# Patient Record
Sex: FEMALE | Race: WHITE | NOT HISPANIC OR LATINO | Employment: OTHER | URBAN - METROPOLITAN AREA
[De-identification: names, ages, dates, MRNs, and addresses within clinical notes are randomized per-mention and may not be internally consistent; named-entity substitution may affect disease eponyms.]

---

## 2023-03-20 DIAGNOSIS — I26.09 OTHER PULMONARY EMBOLISM WITH ACUTE COR PULMONALE (MULTI): ICD-10-CM

## 2023-03-20 DIAGNOSIS — F09 MILD COGNITIVE DISORDER: ICD-10-CM

## 2023-03-20 DIAGNOSIS — I26.02 ACUTE SADDLE PULMONARY EMBOLISM WITH ACUTE COR PULMONALE (MULTI): ICD-10-CM

## 2023-03-20 DIAGNOSIS — I10 BENIGN ESSENTIAL HYPERTENSION: Primary | ICD-10-CM

## 2023-03-20 RX ORDER — RIVAROXABAN 20 MG/1
TABLET, FILM COATED ORAL
Qty: 90 TABLET | Refills: 0 | Status: SHIPPED | OUTPATIENT
Start: 2023-03-20 | End: 2023-06-23

## 2023-05-10 ENCOUNTER — LAB (OUTPATIENT)
Dept: LAB | Facility: LAB | Age: 83
End: 2023-05-10
Payer: MEDICARE

## 2023-05-10 DIAGNOSIS — F09 MILD COGNITIVE DISORDER: ICD-10-CM

## 2023-05-10 DIAGNOSIS — I10 BENIGN ESSENTIAL HYPERTENSION: ICD-10-CM

## 2023-05-10 LAB
ALANINE AMINOTRANSFERASE (SGPT) (U/L) IN SER/PLAS: 9 U/L (ref 7–45)
ALBUMIN (G/DL) IN SER/PLAS: 3.9 G/DL (ref 3.4–5)
ALKALINE PHOSPHATASE (U/L) IN SER/PLAS: 57 U/L (ref 33–136)
ANION GAP IN SER/PLAS: 15 MMOL/L (ref 10–20)
ASPARTATE AMINOTRANSFERASE (SGOT) (U/L) IN SER/PLAS: 17 U/L (ref 9–39)
BASOPHILS (10*3/UL) IN BLOOD BY AUTOMATED COUNT: 0.04 X10E9/L (ref 0–0.1)
BASOPHILS/100 LEUKOCYTES IN BLOOD BY AUTOMATED COUNT: 0.6 % (ref 0–2)
BILIRUBIN TOTAL (MG/DL) IN SER/PLAS: 0.9 MG/DL (ref 0–1.2)
CALCIUM (MG/DL) IN SER/PLAS: 9.7 MG/DL (ref 8.6–10.3)
CARBON DIOXIDE, TOTAL (MMOL/L) IN SER/PLAS: 26 MMOL/L (ref 21–32)
CHLORIDE (MMOL/L) IN SER/PLAS: 104 MMOL/L (ref 98–107)
CHOLESTEROL (MG/DL) IN SER/PLAS: 235 MG/DL (ref 0–199)
CHOLESTEROL IN HDL (MG/DL) IN SER/PLAS: 110.6 MG/DL
CHOLESTEROL/HDL RATIO: 2.1
CREATININE (MG/DL) IN SER/PLAS: 1.36 MG/DL (ref 0.5–1.05)
EOSINOPHILS (10*3/UL) IN BLOOD BY AUTOMATED COUNT: 0.06 X10E9/L (ref 0–0.4)
EOSINOPHILS/100 LEUKOCYTES IN BLOOD BY AUTOMATED COUNT: 0.9 % (ref 0–6)
ERYTHROCYTE DISTRIBUTION WIDTH (RATIO) BY AUTOMATED COUNT: 13.3 % (ref 11.5–14.5)
ERYTHROCYTE MEAN CORPUSCULAR HEMOGLOBIN CONCENTRATION (G/DL) BY AUTOMATED: 33.1 G/DL (ref 32–36)
ERYTHROCYTE MEAN CORPUSCULAR VOLUME (FL) BY AUTOMATED COUNT: 107 FL (ref 80–100)
ERYTHROCYTES (10*6/UL) IN BLOOD BY AUTOMATED COUNT: 4.23 X10E12/L (ref 4–5.2)
GFR FEMALE: 39 ML/MIN/1.73M2
GLUCOSE (MG/DL) IN SER/PLAS: 100 MG/DL (ref 74–99)
HEMATOCRIT (%) IN BLOOD BY AUTOMATED COUNT: 45.3 % (ref 36–46)
HEMOGLOBIN (G/DL) IN BLOOD: 15 G/DL (ref 12–16)
IMMATURE GRANULOCYTES/100 LEUKOCYTES IN BLOOD BY AUTOMATED COUNT: 0.5 % (ref 0–0.9)
LDL: 99 MG/DL (ref 0–99)
LEUKOCYTES (10*3/UL) IN BLOOD BY AUTOMATED COUNT: 6.5 X10E9/L (ref 4.4–11.3)
LYMPHOCYTES (10*3/UL) IN BLOOD BY AUTOMATED COUNT: 1.86 X10E9/L (ref 0.8–3)
LYMPHOCYTES/100 LEUKOCYTES IN BLOOD BY AUTOMATED COUNT: 28.4 % (ref 13–44)
MONOCYTES (10*3/UL) IN BLOOD BY AUTOMATED COUNT: 0.76 X10E9/L (ref 0.05–0.8)
MONOCYTES/100 LEUKOCYTES IN BLOOD BY AUTOMATED COUNT: 11.6 % (ref 2–10)
NEUTROPHILS (10*3/UL) IN BLOOD BY AUTOMATED COUNT: 3.79 X10E9/L (ref 1.6–5.5)
NEUTROPHILS/100 LEUKOCYTES IN BLOOD BY AUTOMATED COUNT: 58 % (ref 40–80)
PLATELETS (10*3/UL) IN BLOOD AUTOMATED COUNT: 235 X10E9/L (ref 150–450)
POTASSIUM (MMOL/L) IN SER/PLAS: 4.7 MMOL/L (ref 3.5–5.3)
PROTEIN TOTAL: 6.4 G/DL (ref 6.4–8.2)
SODIUM (MMOL/L) IN SER/PLAS: 140 MMOL/L (ref 136–145)
TRIGLYCERIDE (MG/DL) IN SER/PLAS: 125 MG/DL (ref 0–149)
UREA NITROGEN (MG/DL) IN SER/PLAS: 19 MG/DL (ref 6–23)
VLDL: 25 MG/DL (ref 0–40)

## 2023-05-10 PROCEDURE — 82607 VITAMIN B-12: CPT

## 2023-05-10 PROCEDURE — 80061 LIPID PANEL: CPT

## 2023-05-10 PROCEDURE — 36415 COLL VENOUS BLD VENIPUNCTURE: CPT

## 2023-05-10 PROCEDURE — 80053 COMPREHEN METABOLIC PANEL: CPT

## 2023-05-10 PROCEDURE — 84443 ASSAY THYROID STIM HORMONE: CPT

## 2023-05-10 PROCEDURE — 85025 COMPLETE CBC W/AUTO DIFF WBC: CPT

## 2023-05-11 LAB
COBALAMIN (VITAMIN B12) (PG/ML) IN SER/PLAS: 1287 PG/ML (ref 211–911)
THYROTROPIN (MIU/L) IN SER/PLAS BY DETECTION LIMIT <= 0.05 MIU/L: 2.63 MIU/L (ref 0.44–3.98)

## 2023-05-15 ENCOUNTER — OFFICE VISIT (OUTPATIENT)
Dept: PRIMARY CARE | Facility: CLINIC | Age: 83
End: 2023-05-15
Payer: MEDICARE

## 2023-05-15 VITALS
HEART RATE: 68 BPM | BODY MASS INDEX: 24.16 KG/M2 | DIASTOLIC BLOOD PRESSURE: 64 MMHG | WEIGHT: 145 LBS | HEIGHT: 65 IN | SYSTOLIC BLOOD PRESSURE: 130 MMHG

## 2023-05-15 DIAGNOSIS — Z00.00 ROUTINE GENERAL MEDICAL EXAMINATION AT HEALTH CARE FACILITY: Primary | ICD-10-CM

## 2023-05-15 DIAGNOSIS — M81.8 IDIOPATHIC OSTEOPOROSIS: ICD-10-CM

## 2023-05-15 DIAGNOSIS — I10 BENIGN ESSENTIAL HYPERTENSION: ICD-10-CM

## 2023-05-15 DIAGNOSIS — N18.32 STAGE 3B CHRONIC KIDNEY DISEASE (MULTI): ICD-10-CM

## 2023-05-15 DIAGNOSIS — D68.59 OTHER PRIMARY THROMBOPHILIA (MULTI): ICD-10-CM

## 2023-05-15 DIAGNOSIS — I26.09 PULMONARY EMBOLISM WITH ACUTE COR PULMONALE, UNSPECIFIED CHRONICITY, UNSPECIFIED PULMONARY EMBOLISM TYPE (MULTI): ICD-10-CM

## 2023-05-15 DIAGNOSIS — M81.0 POSTMENOPAUSAL BONE LOSS: ICD-10-CM

## 2023-05-15 DIAGNOSIS — Z12.31 VISIT FOR SCREENING MAMMOGRAM: ICD-10-CM

## 2023-05-15 PROBLEM — Z95.828 PRESENCE OF IVC FILTER: Status: ACTIVE | Noted: 2023-05-15

## 2023-05-15 PROBLEM — E78.5 HYPERLIPIDEMIA: Status: ACTIVE | Noted: 2023-05-15

## 2023-05-15 PROBLEM — R29.898 COGWHEEL RIGIDITY: Status: ACTIVE | Noted: 2023-05-15

## 2023-05-15 PROBLEM — Z20.822 EXPOSURE TO COVID-19 VIRUS: Status: ACTIVE | Noted: 2023-05-15

## 2023-05-15 PROBLEM — R91.1 LUNG NODULE: Status: ACTIVE | Noted: 2023-05-15

## 2023-05-15 PROBLEM — K63.5 COLON POLYPS: Status: ACTIVE | Noted: 2023-05-15

## 2023-05-15 PROBLEM — I26.99 ACUTE PULMONARY EMBOLISM (MULTI): Status: ACTIVE | Noted: 2021-06-23

## 2023-05-15 PROBLEM — I26.99 PULMONARY EMBOLUS (MULTI): Status: ACTIVE | Noted: 2023-05-15

## 2023-05-15 PROBLEM — G25.9 EXTRAPYRAMIDAL MOVEMENT DISORDER: Status: ACTIVE | Noted: 2023-05-15

## 2023-05-15 PROBLEM — D69.6 THROMBOCYTOPENIA (CMS-HCC): Status: ACTIVE | Noted: 2021-06-24

## 2023-05-15 PROBLEM — R92.8 ABNORMAL MAMMOGRAM: Status: ACTIVE | Noted: 2023-05-15

## 2023-05-15 PROBLEM — E55.9 VITAMIN D DEFICIENCY: Status: ACTIVE | Noted: 2023-05-15

## 2023-05-15 PROBLEM — G31.84 MILD COGNITIVE IMPAIRMENT: Status: ACTIVE | Noted: 2023-05-15

## 2023-05-15 PROBLEM — E53.8 VITAMIN B12 DEFICIENCY: Status: ACTIVE | Noted: 2023-05-15

## 2023-05-15 PROBLEM — R79.89 ELEVATED SERUM CREATININE: Status: ACTIVE | Noted: 2023-05-15

## 2023-05-15 PROBLEM — R46.89 BEHAVIORAL CHANGE: Status: ACTIVE | Noted: 2023-05-15

## 2023-05-15 PROCEDURE — 1036F TOBACCO NON-USER: CPT | Performed by: INTERNAL MEDICINE

## 2023-05-15 PROCEDURE — 3078F DIAST BP <80 MM HG: CPT | Performed by: INTERNAL MEDICINE

## 2023-05-15 PROCEDURE — 1159F MED LIST DOCD IN RCRD: CPT | Performed by: INTERNAL MEDICINE

## 2023-05-15 PROCEDURE — 3075F SYST BP GE 130 - 139MM HG: CPT | Performed by: INTERNAL MEDICINE

## 2023-05-15 PROCEDURE — 1170F FXNL STATUS ASSESSED: CPT | Performed by: INTERNAL MEDICINE

## 2023-05-15 PROCEDURE — 1160F RVW MEDS BY RX/DR IN RCRD: CPT | Performed by: INTERNAL MEDICINE

## 2023-05-15 PROCEDURE — 99214 OFFICE O/P EST MOD 30 MIN: CPT | Performed by: INTERNAL MEDICINE

## 2023-05-15 RX ORDER — ACETAMINOPHEN, DEXTROMETHORPHAN HBR, DOXYLAMINE SUCCINATE, PHENYLEPHRINE HCL 650; 20; 12.5; 1 MG/30ML; MG/30ML; MG/30ML; MG/30ML
1 SOLUTION ORAL DAILY
COMMUNITY

## 2023-05-15 RX ORDER — ACETAMINOPHEN 500 MG
50 TABLET ORAL DAILY
COMMUNITY

## 2023-05-15 ASSESSMENT — ACTIVITIES OF DAILY LIVING (ADL)
BATHING: INDEPENDENT
MANAGING_FINANCES: NEEDS ASSISTANCE
GROCERY_SHOPPING: NEEDS ASSISTANCE
DOING_HOUSEWORK: NEEDS ASSISTANCE
TAKING_MEDICATION: INDEPENDENT
DRESSING: INDEPENDENT

## 2023-05-15 ASSESSMENT — PATIENT HEALTH QUESTIONNAIRE - PHQ9
1. LITTLE INTEREST OR PLEASURE IN DOING THINGS: NOT AT ALL
2. FEELING DOWN, DEPRESSED OR HOPELESS: NOT AT ALL
SUM OF ALL RESPONSES TO PHQ9 QUESTIONS 1 AND 2: 0

## 2023-05-15 NOTE — PROGRESS NOTES
"Subjective   Patient ID: Lorie Ding is a 82 y.o. female who presents for Medicare Annual Wellness Visit Subsequent.    HPI     Review of Systems    Objective   Ht 1.651 m (5' 5\")   Wt 65.8 kg (145 lb)   BMI 24.13 kg/m²     Physical Exam    Assessment/Plan          "

## 2023-05-15 NOTE — PROGRESS NOTES
"Subjective   Reason for Visit: Lorie Ding is an 82 y.o. female here for a Medicare Wellness visit.     Past Medical, Surgical, and Family History reviewed and updated in chart.    Reviewed all medications by prescribing practitioner or clinical pharmacist (such as prescriptions, OTCs, herbal therapies and supplements) and documented in the medical record.     is worried about her b12 level is high  She walks and plays pickleball  No cp or pressure, no sob palps or LH  Bowels are regular  No urinary issues        Patient Care Team:  Shelley Márquez DO as PCP - General  Shelley Márquez DO as PCP - [facility] [place] [place] [facility] Attributed Provider     Review of Systems    Objective   Vitals:  /64   Pulse 68   Ht 1.651 m (5' 5\")   Wt 65.8 kg (145 lb)   BMI 24.13 kg/m²       Physical Exam  Constitutional:       Appearance: Normal appearance.   Neck:      Vascular: No carotid bruit.   Cardiovascular:      Rate and Rhythm: Normal rate and regular rhythm.      Heart sounds: No murmur heard.  Pulmonary:      Effort: Pulmonary effort is normal.      Breath sounds: Normal breath sounds.   Musculoskeletal:      Right lower leg: No edema.      Left lower leg: No edema.   Skin:     Findings: Lesion (eschar right nasal ala) present.   Neurological:      Mental Status: She is alert.      Comments: Pt with masked face and hypophonia,   Pt was more interactive today, able to answer questions clearly, name of derm who did mohs and who they work with         Assessment/Plan   Problem List Items Addressed This Visit    None  Visit Diagnoses       Routine general medical examination at health care facility    -  Primary            Patient Instructions   Mammogram due in September    Osteoporosis- last bone density was in august, 2021, repeat in sept 2023, call [phone] to schedule mammogram and bone density in september    Colon polyps- last colonoscopy November 2020, repeat in 2025    Recurrent blood clots, on xarelto, life time " need    Immunizations  Had shingrix x 2 2020  Flu shot in the fall, annually  Had new covid booster in September 2022  Recommend prevnar 20 at the pharmacy and then next year would get the pneumovax 23, then would be up to date with all pneumonia shots.     Memory issues and early parkinson symptoms discussed  We discussed importance of exercise and interaction, consider joining a book club to discuss books/interact and also you enjoy pickleball, join a pickleball group in the area    Chronic kidney disease is stable, follow up with Dr. Garcia, avoid NSAIDS, and iv contrast, stay hydrated    Large red cells discussed, limit wine to 4 oz a day    Blood pressure is well controlled  Recheck in 1 year

## 2023-05-15 NOTE — PATIENT INSTRUCTIONS
Mammogram due in September    Osteoporosis- last bone density was in august, 2021, repeat in sept 2023, call [phone] to schedule mammogram and bone density in september    Colon polyps- last colonoscopy November 2020, repeat in 2025    Recurrent blood clots, on xarelto, life time need    Immunizations  Had shingrix x 2 2020  Flu shot in the fall, annually  Had new covid booster in September 2022  Recommend prevnar 20 at the pharmacy and then next year would get the pneumovax 23, then would be up to date with all pneumonia shots.     Memory issues and early parkinson symptoms discussed  We discussed importance of exercise and interaction, consider joining a book club to discuss books/interact and also you enjoy pickleball, join a pickleball group in the area    Chronic kidney disease is stable, follow up with Dr. Garcia, avoid NSAIDS, and iv contrast, stay hydrated    Large red cells discussed, limit wine to 4 oz a day    Blood pressure is well controlled  Recheck in 1 year

## 2023-06-08 LAB
ALBUMIN (MG/L) IN URINE: 10.5 MG/L
ALBUMIN/CREATININE (UG/MG) IN URINE: 5 UG/MG CRT (ref 0–30)
CREATININE (MG/DL) IN URINE: 212 MG/DL (ref 20–320)

## 2023-06-22 DIAGNOSIS — I26.09 OTHER PULMONARY EMBOLISM WITH ACUTE COR PULMONALE (MULTI): ICD-10-CM

## 2023-06-23 RX ORDER — RIVAROXABAN 20 MG/1
TABLET, FILM COATED ORAL
Qty: 90 TABLET | Refills: 1 | Status: SHIPPED | OUTPATIENT
Start: 2023-06-23 | End: 2023-12-13 | Stop reason: SDUPTHER

## 2023-07-21 DIAGNOSIS — I10 HYPERTENSION, UNSPECIFIED TYPE: ICD-10-CM

## 2023-07-21 RX ORDER — LOSARTAN POTASSIUM 50 MG/1
50 TABLET ORAL DAILY
Qty: 90 TABLET | Refills: 1 | Status: SHIPPED | OUTPATIENT
Start: 2023-07-21 | End: 2023-12-13 | Stop reason: SDUPTHER

## 2023-07-21 NOTE — TELEPHONE ENCOUNTER
Requested Prescriptions     Pending Prescriptions Disp Refills    losartan (Cozaar) 50 mg tablet 90 tablet 1     Sig: Take 1 tablet (50 mg) by mouth once daily.

## 2023-08-29 PROBLEM — R55 SYNCOPE: Status: ACTIVE | Noted: 2023-08-29

## 2023-08-29 RX ORDER — DOXYCYCLINE 100 MG/1
100 CAPSULE ORAL 2 TIMES DAILY
COMMUNITY
Start: 2023-06-28 | End: 2023-10-31 | Stop reason: WASHOUT

## 2023-08-29 NOTE — PROGRESS NOTES
Subjective   Patient ID: Lorie Ding is a 82 y.o. female who presents for Hospital Follow-up.    HPI     Review of Systems    Objective   There were no vitals taken for this visit.    Physical Exam    Assessment/Plan

## 2023-08-30 ENCOUNTER — OFFICE VISIT (OUTPATIENT)
Dept: PRIMARY CARE | Facility: CLINIC | Age: 83
End: 2023-08-30
Payer: MEDICARE

## 2023-08-30 VITALS
DIASTOLIC BLOOD PRESSURE: 78 MMHG | SYSTOLIC BLOOD PRESSURE: 135 MMHG | HEART RATE: 103 BPM | BODY MASS INDEX: 23.96 KG/M2 | OXYGEN SATURATION: 98 % | WEIGHT: 144 LBS

## 2023-08-30 DIAGNOSIS — R55 SYNCOPE, UNSPECIFIED SYNCOPE TYPE: Primary | ICD-10-CM

## 2023-08-30 DIAGNOSIS — Z12.31 VISIT FOR SCREENING MAMMOGRAM: ICD-10-CM

## 2023-08-30 PROCEDURE — 99214 OFFICE O/P EST MOD 30 MIN: CPT | Performed by: INTERNAL MEDICINE

## 2023-08-30 PROCEDURE — 3075F SYST BP GE 130 - 139MM HG: CPT | Performed by: INTERNAL MEDICINE

## 2023-08-30 PROCEDURE — 1036F TOBACCO NON-USER: CPT | Performed by: INTERNAL MEDICINE

## 2023-08-30 PROCEDURE — 1159F MED LIST DOCD IN RCRD: CPT | Performed by: INTERNAL MEDICINE

## 2023-08-30 PROCEDURE — 1160F RVW MEDS BY RX/DR IN RCRD: CPT | Performed by: INTERNAL MEDICINE

## 2023-08-30 PROCEDURE — 3078F DIAST BP <80 MM HG: CPT | Performed by: INTERNAL MEDICINE

## 2023-08-30 NOTE — PROGRESS NOTES
Subjective   Patient ID: Lorie Ding is a 82 y.o. female who presents for Hospital Follow-up.    She was at an exercise class, she just got weak and fell to the floor, not sweaty or sob  No loss of bowel or bladder  She lost consciousness for a few seconds.     No palpitations at the time prior to the appt.   No problems doing the work out prior  Happened when standing doing chair yoga    She walks about 3/4 mile a day           Review of Systems    Objective   /77   Pulse 83   Wt 65.3 kg (144 lb)   SpO2 98%   BMI 23.96 kg/m²     Physical Exam  Cardiovascular:      Rate and Rhythm: Normal rate and regular rhythm.      Heart sounds: No murmur heard.  Neurological:      Mental Status: She is alert and oriented to person, place, and time.      Comments: Masked face and stiff movements   No tremor         Assessment/Plan          Patient Instructions   Syncope /passing out  Prior history related to large lung blood clot, but this episode ct showed no clot  Last echocardiogram was normal, no valve issues  Get zio patch/heart monitor, wear for up to 14 days and mail in, to check for heart rhythm problems, call for results 2 weeks after you mail it in  If persists would have you see neurology, to check for  POTS    I left a message for cardiology to schedule a time to get the zio patch    Call [phone] to schedule mammogram (density of breasts on ct, may be dense breasts)     40min spent reviewing ER record/results, and causes of syncope  Of note her bp went from 135/78 standing with 80 pulse to 122/73 standing and 103 pulse

## 2023-08-30 NOTE — PATIENT INSTRUCTIONS
Syncope /passing out  Prior history related to large lung blood clot, but this episode ct showed no clot  Last echocardiogram was normal, no valve issues  Get zio patch/heart monitor, wear for up to 14 days and mail in, to check for heart rhythm problems, call for results 2 weeks after you mail it in  If persists would have you see neurology, to check for  POTS    I left a message for cardiology to schedule a time to get the zio patch    Call [phone] to schedule mammogram (density of breasts on ct, may be dense breasts)        Ways to Help Prevent Falls at Home    Quick Tips   ? Ask for help if you need it. Most people want to help!   ? Get up slowly after sitting or laying down   ? Wear a medical alert device or keep cell phone in your pocket   ? Use night lights, especially areas near a bathroom   ? Keep the items you use often within reach on a small stool or end table   ? Use an assistive device such as walker or cane, as directed by provider/physical therapy   ? Use a non-slip mat and grab bars in your bathroom. Look for home health sections for best options     Other Areas to Focus On   ? Exercise and nutrition: Regular exercise or taking a falls prevention class are great ways improve strength and balance. Don’t forget to stay hydrated and bring a snack!   ? Medicine side effects: Some medicines can make you sleepy or dizzy, which could cause a fall. Ask your healthcare provider about the side effects your medicines could cause. Be sure to let them know if you take any vitamins or supplements as well.   ? Tripping hazards: Remove items you could trip on, such as loose mats, rugs, cords, and clutter. Wear closed toe shoes with rubber soles.   ? Health and wellness: Get regular checkups with your healthcare provider, plus routine vision and hearing screenings. Talk with your healthcare provider about:   o Your medicines and the possible side effects - bring them in a bag if that is easier!   o Problems with  balance or feeling dizzy   o Ways to promote bone health, such as Vitamin D and calcium supplements   o Questions or concerns about falling     *Ask your healthcare team if you have questions     ©[facility], 2022

## 2023-10-11 ENCOUNTER — APPOINTMENT (OUTPATIENT)
Dept: PRIMARY CARE | Facility: CLINIC | Age: 83
End: 2023-10-11
Payer: MEDICARE

## 2023-10-13 DIAGNOSIS — I47.10 SVT (SUPRAVENTRICULAR TACHYCARDIA) (CMS-HCC): Primary | ICD-10-CM

## 2023-10-13 DIAGNOSIS — R55 SYNCOPE, UNSPECIFIED SYNCOPE TYPE: ICD-10-CM

## 2023-10-16 ENCOUNTER — APPOINTMENT (OUTPATIENT)
Dept: RADIOLOGY | Facility: HOSPITAL | Age: 83
End: 2023-10-16
Payer: MEDICARE

## 2023-10-20 ENCOUNTER — HOSPITAL ENCOUNTER (OUTPATIENT)
Dept: RADIOLOGY | Facility: HOSPITAL | Age: 83
Discharge: HOME | End: 2023-10-20
Payer: MEDICARE

## 2023-10-20 DIAGNOSIS — M81.0 AGE-RELATED OSTEOPOROSIS WITHOUT CURRENT PATHOLOGICAL FRACTURE: ICD-10-CM

## 2023-10-20 DIAGNOSIS — Z12.31 ENCOUNTER FOR SCREENING MAMMOGRAM FOR MALIGNANT NEOPLASM OF BREAST: ICD-10-CM

## 2023-10-20 PROCEDURE — 77067 SCR MAMMO BI INCL CAD: CPT | Mod: BILATERAL PROCEDURE | Performed by: RADIOLOGY

## 2023-10-20 PROCEDURE — 77085 DXA BONE DENSITY AXL VRT FX: CPT

## 2023-10-20 PROCEDURE — 77085 DXA BONE DENSITY AXL VRT FX: CPT | Performed by: STUDENT IN AN ORGANIZED HEALTH CARE EDUCATION/TRAINING PROGRAM

## 2023-10-20 PROCEDURE — 77063 BREAST TOMOSYNTHESIS BI: CPT | Mod: BILATERAL PROCEDURE | Performed by: RADIOLOGY

## 2023-10-20 PROCEDURE — 77067 SCR MAMMO BI INCL CAD: CPT | Mod: 50

## 2023-10-24 RX ORDER — LOSARTAN POTASSIUM 100 MG/1
100 TABLET ORAL DAILY
COMMUNITY
Start: 2021-06-30 | End: 2023-10-31 | Stop reason: WASHOUT

## 2023-10-24 RX ORDER — CHOLECALCIFEROL (VITAMIN D3) 125 MCG
125 CAPSULE ORAL DAILY
COMMUNITY
End: 2023-10-31 | Stop reason: WASHOUT

## 2023-10-25 ENCOUNTER — OFFICE VISIT (OUTPATIENT)
Dept: CARDIOLOGY | Facility: CLINIC | Age: 83
End: 2023-10-25
Payer: MEDICARE

## 2023-10-25 ENCOUNTER — HOSPITAL ENCOUNTER (OUTPATIENT)
Dept: CARDIOLOGY | Facility: CLINIC | Age: 83
Discharge: HOME | End: 2023-10-25
Payer: MEDICARE

## 2023-10-25 VITALS
WEIGHT: 144.06 LBS | BODY MASS INDEX: 24 KG/M2 | HEIGHT: 65 IN | DIASTOLIC BLOOD PRESSURE: 78 MMHG | HEART RATE: 88 BPM | SYSTOLIC BLOOD PRESSURE: 134 MMHG | OXYGEN SATURATION: 95 %

## 2023-10-25 DIAGNOSIS — R55 SYNCOPE AND COLLAPSE: Primary | ICD-10-CM

## 2023-10-25 DIAGNOSIS — I47.10 SVT (SUPRAVENTRICULAR TACHYCARDIA) (CMS-HCC): ICD-10-CM

## 2023-10-25 DIAGNOSIS — R55 SYNCOPE, UNSPECIFIED SYNCOPE TYPE: ICD-10-CM

## 2023-10-25 PROCEDURE — 93010 ELECTROCARDIOGRAM REPORT: CPT | Performed by: INTERNAL MEDICINE

## 2023-10-25 PROCEDURE — 1159F MED LIST DOCD IN RCRD: CPT | Performed by: STUDENT IN AN ORGANIZED HEALTH CARE EDUCATION/TRAINING PROGRAM

## 2023-10-25 PROCEDURE — 3078F DIAST BP <80 MM HG: CPT | Performed by: STUDENT IN AN ORGANIZED HEALTH CARE EDUCATION/TRAINING PROGRAM

## 2023-10-25 PROCEDURE — 3075F SYST BP GE 130 - 139MM HG: CPT | Performed by: STUDENT IN AN ORGANIZED HEALTH CARE EDUCATION/TRAINING PROGRAM

## 2023-10-25 PROCEDURE — 1126F AMNT PAIN NOTED NONE PRSNT: CPT | Performed by: STUDENT IN AN ORGANIZED HEALTH CARE EDUCATION/TRAINING PROGRAM

## 2023-10-25 PROCEDURE — 1160F RVW MEDS BY RX/DR IN RCRD: CPT | Performed by: STUDENT IN AN ORGANIZED HEALTH CARE EDUCATION/TRAINING PROGRAM

## 2023-10-25 PROCEDURE — 99204 OFFICE O/P NEW MOD 45 MIN: CPT | Performed by: STUDENT IN AN ORGANIZED HEALTH CARE EDUCATION/TRAINING PROGRAM

## 2023-10-25 PROCEDURE — 99214 OFFICE O/P EST MOD 30 MIN: CPT | Mod: PO | Performed by: STUDENT IN AN ORGANIZED HEALTH CARE EDUCATION/TRAINING PROGRAM

## 2023-10-25 PROCEDURE — 1036F TOBACCO NON-USER: CPT | Performed by: STUDENT IN AN ORGANIZED HEALTH CARE EDUCATION/TRAINING PROGRAM

## 2023-10-25 PROCEDURE — 93005 ELECTROCARDIOGRAM TRACING: CPT

## 2023-10-25 ASSESSMENT — PAIN SCALES - GENERAL: PAINLEVEL: 0-NO PAIN

## 2023-10-25 NOTE — PROGRESS NOTES
Referred by Dr. Márquez for No chief complaint on file.     History Of Present Illness:    Lorie Ding is a 82 y.o. female  referred to EP by Dr. Márquez  due to syncope. She has a history of hypertension, chronic kidney disease stage III, pulmonary embolism and on DOAC; s/p IVC filter, vitamin D deficiency and mild cognitive impairment. This year, the patient had one episode of syncope in July during yoga lasting 2-3 seconds, no warning signs. She had a near syncopal episode about 3 weeks ago during yoga. She has had other episodes prior. Event monitor was placed and showed episodes of atrial flutter (longest lasting ~ 7 minutes) and atrial tach with rates up to 182 bpm. She is currently on Xarelto (hx of PE).     Past Medical History:  She has a past medical history of Encounter for gynecological examination (general) (routine) without abnormal findings and Other conditions influencing health status.    Past Surgical History:  She has a past surgical history that includes Other surgical history (05/31/2013); Other surgical history (05/31/2013); and Tonsillectomy (05/31/2013).      Social History:  She reports that she has never smoked. She has never used smokeless tobacco. She reports current alcohol use. She reports that she does not use drugs.    Family History:  Family History   Problem Relation Name Age of Onset    Colon cancer Mother      Other (Old Age) Father          Allergies:  Sulfa (sulfonamide antibiotics)    Outpatient Medications:  Current Outpatient Medications   Medication Instructions    cholecalciferol (VITAMIN D-3) 50 mcg, oral, Daily    cholecalciferol (VITAMIN D-3) 125 mcg, oral, Daily    cyanocobalamin, vitamin B-12, (Vitamin B-12) 1,000 mcg tablet extended release 1 tablet, oral, Daily, as directed    doxycycline (VIBRAMYCIN) 100 mg, oral, 2 times daily    losartan (COZAAR) 50 mg, oral, Daily    losartan (COZAAR) 100 mg, oral, Daily    Xarelto 20 mg tablet TAKE 1 TABLET BY MOUTH EVERY  "DAY        Last Recorded Vitals:  Vitals:    10/25/23 1057   BP: 134/78   BP Location: Left arm   Patient Position: Sitting   Pulse: 88   SpO2: 95%   Weight: 65.3 kg (144 lb 1 oz)   Height: 1.651 m (5' 5\")       Review of Systems   Constitutional:  Negative for fever.   Respiratory:  Negative for shortness of breath.    Cardiovascular:  Negative for chest pain, palpitations and leg swelling.        As per history.   Neurological:  Positive for syncope.   Psychiatric/Behavioral:  Negative for confusion.       Physical Exam  Constitutional:       Appearance: Normal appearance.   Cardiovascular:      Rate and Rhythm: Normal rate and regular rhythm.      Heart sounds: No murmur heard.     No friction rub. No gallop.   Pulmonary:      Effort: Pulmonary effort is normal.      Breath sounds: Normal breath sounds.   Abdominal:      Palpations: Abdomen is soft.   Musculoskeletal:      Cervical back: Neck supple.   Neurological:      Mental Status: She is alert.   Psychiatric:         Mood and Affect: Mood normal.         Behavior: Behavior normal.             Last Labs:  CBC -  Lab Results   Component Value Date    WBC 6.7 07/31/2023    HGB 13.9 07/31/2023    HCT 43.3 07/31/2023     (H) 07/31/2023     07/31/2023       CMP -  Lab Results   Component Value Date    CALCIUM 9.3 07/31/2023    PHOS 2.7 12/17/2022    PROT 6.6 07/31/2023    ALBUMIN 3.8 07/31/2023    AST 16 07/31/2023    ALT 9 07/31/2023    ALKPHOS 57 07/31/2023    BILITOT 0.6 07/31/2023       LIPID PANEL -   Lab Results   Component Value Date    CHOL 235 (H) 05/10/2023    TRIG 125 05/10/2023    .6 05/10/2023    CHHDL 2.1 05/10/2023    LDLF 99 05/10/2023    VLDL 25 05/10/2023       RENAL FUNCTION PANEL -   Lab Results   Component Value Date    GLUCOSE 96 07/31/2023     07/31/2023    K 4.5 07/31/2023     07/31/2023    CO2 27 07/31/2023    ANIONGAP 13 07/31/2023    BUN 25 (H) 07/31/2023    CREATININE 1.45 (H) 07/31/2023    CALCIUM 9.3 " "07/31/2023    PHOS 2.7 12/17/2022    ALBUMIN 3.8 07/31/2023        No results found for: \"BNP\", \"HGBA1C\"    Last Cardiology Tests:  ECG (10/25/23):  Sinus rhythm, HR 70 bpm.      Echo (2021):    CONCLUSIONS:   1. The left ventricular systolic function is normal with a 60-65% estimated ejection fraction.   2. Spectral Doppler shows an impaired relaxation pattern of left ventricular diastolic filling.   3. The right ventricle is mildly enlarged. There is normal right ventricular global systolic function.   4. There is mild tricuspid regurgitation.   5. Moderately elevated pulmonary artery pressure, estimated RVSP 50mmHg.    Event monitor (11 days - 09/2023):  Patient had a min HR of 52 bpm, max HR of 182 bpm, and avg HR of 83 bpm. Predominant underlying rhythm was Sinus Rhythm. 115 Supraventricular Tachycardia runs occurred, the run with the fastest interval lasting 10 beats with a max rate of 182 bpm, the longest lasting 15 beats with an avg rate of 118 bpm. Some episodes of Supraventricular Tachycardia may be possible Atrial Tachycardia with variable block. Atrial Flutter occurred (<1% burden), ranging from  bpm (avg of 133 bpm), the longest lasting 6 mins 57 secs with an avg rate of 143 bpm. Isolated SVEs were rare (<1.0%), SVE Couplets were rare (<1.0%), and SVE Triplets were rare (<1.0%). Isolated VEs were rare (<1.0%), VE Couplets were rare (<1.0%), and no VE Triplets were present. Ventricular Bigeminy was present.      Assessment/Plan     Hx of PE on Xarelto. Patient had one episode of syncope in July during yoga lasting 2-3 seconds, no warning signs. She had a near syncopal episode about 3 weeks ago during yoga. Event monitor revealed episodes of atrial flutter (longest lasting ~ 7 minutes) and atrial tach with rates up to 182 bpm. She is currently on Xarelto (hx of PE). Will order CT calcium score to rule out CAD. If normal, will plan to start flecainide.      Abel Jimenez MD  "

## 2023-10-30 ENCOUNTER — HOSPITAL ENCOUNTER (OUTPATIENT)
Dept: RADIOLOGY | Facility: HOSPITAL | Age: 83
Discharge: HOME | End: 2023-10-30
Payer: MEDICARE

## 2023-10-30 DIAGNOSIS — I47.10 SVT (SUPRAVENTRICULAR TACHYCARDIA) (CMS-HCC): ICD-10-CM

## 2023-10-30 DIAGNOSIS — R55 SYNCOPE AND COLLAPSE: ICD-10-CM

## 2023-10-30 PROCEDURE — 75571 CT HRT W/O DYE W/CA TEST: CPT | Mod: MG

## 2023-10-31 ENCOUNTER — ANCILLARY PROCEDURE (OUTPATIENT)
Dept: RADIOLOGY | Facility: HOSPITAL | Age: 83
End: 2023-10-31
Payer: MEDICARE

## 2023-10-31 DIAGNOSIS — R92.8 ABNORMAL MAMMOGRAM: ICD-10-CM

## 2023-10-31 PROCEDURE — 76642 ULTRASOUND BREAST LIMITED: CPT | Mod: BILATERAL PROCEDURE | Performed by: RADIOLOGY

## 2023-10-31 PROCEDURE — 76642 ULTRASOUND BREAST LIMITED: CPT | Mod: 50,59

## 2023-10-31 PROCEDURE — 76982 USE 1ST TARGET LESION: CPT

## 2023-10-31 ASSESSMENT — ENCOUNTER SYMPTOMS
CONFUSION: 0
PALPITATIONS: 0
SHORTNESS OF BREATH: 0
FEVER: 0

## 2023-11-08 ENCOUNTER — OFFICE VISIT (OUTPATIENT)
Dept: CARDIOLOGY | Facility: CLINIC | Age: 83
End: 2023-11-08
Payer: MEDICARE

## 2023-11-08 VITALS
SYSTOLIC BLOOD PRESSURE: 148 MMHG | OXYGEN SATURATION: 97 % | WEIGHT: 144 LBS | DIASTOLIC BLOOD PRESSURE: 78 MMHG | HEIGHT: 65 IN | HEART RATE: 66 BPM | BODY MASS INDEX: 23.99 KG/M2

## 2023-11-08 DIAGNOSIS — I47.10 PAROXYSMAL SUPRAVENTRICULAR TACHYCARDIA (CMS-HCC): ICD-10-CM

## 2023-11-08 DIAGNOSIS — R55 SYNCOPE AND COLLAPSE: Primary | ICD-10-CM

## 2023-11-08 DIAGNOSIS — I48.92 ATRIAL FLUTTER, UNSPECIFIED TYPE (MULTI): ICD-10-CM

## 2023-11-08 PROCEDURE — 1126F AMNT PAIN NOTED NONE PRSNT: CPT | Performed by: STUDENT IN AN ORGANIZED HEALTH CARE EDUCATION/TRAINING PROGRAM

## 2023-11-08 PROCEDURE — 3077F SYST BP >= 140 MM HG: CPT | Performed by: STUDENT IN AN ORGANIZED HEALTH CARE EDUCATION/TRAINING PROGRAM

## 2023-11-08 PROCEDURE — 1159F MED LIST DOCD IN RCRD: CPT | Performed by: STUDENT IN AN ORGANIZED HEALTH CARE EDUCATION/TRAINING PROGRAM

## 2023-11-08 PROCEDURE — 1036F TOBACCO NON-USER: CPT | Performed by: STUDENT IN AN ORGANIZED HEALTH CARE EDUCATION/TRAINING PROGRAM

## 2023-11-08 PROCEDURE — 1160F RVW MEDS BY RX/DR IN RCRD: CPT | Performed by: STUDENT IN AN ORGANIZED HEALTH CARE EDUCATION/TRAINING PROGRAM

## 2023-11-08 PROCEDURE — 99214 OFFICE O/P EST MOD 30 MIN: CPT | Performed by: STUDENT IN AN ORGANIZED HEALTH CARE EDUCATION/TRAINING PROGRAM

## 2023-11-08 PROCEDURE — 99214 OFFICE O/P EST MOD 30 MIN: CPT | Mod: PO | Performed by: STUDENT IN AN ORGANIZED HEALTH CARE EDUCATION/TRAINING PROGRAM

## 2023-11-08 PROCEDURE — 3078F DIAST BP <80 MM HG: CPT | Performed by: STUDENT IN AN ORGANIZED HEALTH CARE EDUCATION/TRAINING PROGRAM

## 2023-11-08 RX ORDER — FLECAINIDE ACETATE 50 MG/1
50 TABLET ORAL 2 TIMES DAILY
Qty: 240 TABLET | Refills: 2 | Status: SHIPPED | OUTPATIENT
Start: 2023-11-08 | End: 2024-02-02 | Stop reason: SDUPTHER

## 2023-11-08 ASSESSMENT — PAIN SCALES - GENERAL: PAINLEVEL: 0-NO PAIN

## 2023-11-08 ASSESSMENT — ENCOUNTER SYMPTOMS
DEPRESSION: 0
OCCASIONAL FEELINGS OF UNSTEADINESS: 0
LOSS OF SENSATION IN FEET: 0

## 2023-11-08 NOTE — PROGRESS NOTES
History Of Present Illness:    Lorie Ding is a 82 y.o. female  referred to EP by Dr. Márquez  due to syncope. She has a history of hypertension, chronic kidney disease stage III, pulmonary embolism and on DOAC; s/p IVC filter, vitamin D deficiency and mild cognitive impairment. This year, the patient had one episode of syncope in July during yoga lasting 2-3 seconds, no warning signs. She had a near syncopal episode about 3 weeks ago during yoga. She has had other episodes prior. Event monitor was placed and showed episodes of atrial flutter (longest lasting ~ 7 minutes) and atrial tach with rates up to 182 bpm. She is currently on Xarelto (hx of PE). She is here today for a follow up.     Past Medical History:  She has a past medical history of Encounter for gynecological examination (general) (routine) without abnormal findings and Other conditions influencing health status.     Past Surgical History:  She has a past surgical history that includes Other surgical history (05/31/2013); Other surgical history (05/31/2013); and Tonsillectomy (05/31/2013).      Social History:  She reports that she has never smoked. She has never used smokeless tobacco. She reports current alcohol use. She reports that she does not use drugs.     Family History:  Family History          Family History   Problem Relation Name Age of Onset    Colon cancer Mother        Other (Old Age) Father                Allergies:  Sulfa (sulfonamide antibiotics)     Outpatient Medications:       Current Outpatient Medications   Medication Instructions    cholecalciferol (VITAMIN D-3) 50 mcg, oral, Daily    cholecalciferol (VITAMIN D-3) 125 mcg, oral, Daily    cyanocobalamin, vitamin B-12, (Vitamin B-12) 1,000 mcg tablet extended release 1 tablet, oral, Daily, as directed    doxycycline (VIBRAMYCIN) 100 mg, oral, 2 times daily    losartan (COZAAR) 50 mg, oral, Daily    losartan (COZAAR) 100 mg, oral, Daily    Xarelto 20 mg tablet TAKE 1 TABLET BY  "MOUTH EVERY DAY         Last Recorded Vitals:  Vitals       Vitals:     10/25/23 1057   BP: 134/78   BP Location: Left arm   Patient Position: Sitting   Pulse: 88   SpO2: 95%   Weight: 65.3 kg (144 lb 1 oz)   Height: 1.651 m (5' 5\")            Review of Systems   Constitutional:  Negative for fever.   Respiratory:  Negative for shortness of breath.    Cardiovascular:  Negative for chest pain, palpitations and leg swelling.        As per history.   Neurological:  Positive for syncope.   Psychiatric/Behavioral:  Negative for confusion.       Physical Exam  Constitutional:       Appearance: Normal appearance.   Cardiovascular:      Rate and Rhythm: Normal rate and regular rhythm.      Heart sounds: No murmur heard.     No friction rub. No gallop.   Pulmonary:      Effort: Pulmonary effort is normal.      Breath sounds: Normal breath sounds.   Abdominal:      Palpations: Abdomen is soft.   Musculoskeletal:      Cervical back: Neck supple.   Neurological:      Mental Status: She is alert.   Psychiatric:         Mood and Affect: Mood normal.         Behavior: Behavior normal.               Last Labs:  CBC -        Lab Results   Component Value Date     WBC 6.7 07/31/2023     HGB 13.9 07/31/2023     HCT 43.3 07/31/2023      (H) 07/31/2023      07/31/2023         CMP -        Lab Results   Component Value Date     CALCIUM 9.3 07/31/2023     PHOS 2.7 12/17/2022     PROT 6.6 07/31/2023     ALBUMIN 3.8 07/31/2023     AST 16 07/31/2023     ALT 9 07/31/2023     ALKPHOS 57 07/31/2023     BILITOT 0.6 07/31/2023         LIPID PANEL -         Lab Results   Component Value Date     CHOL 235 (H) 05/10/2023     TRIG 125 05/10/2023     .6 05/10/2023     CHHDL 2.1 05/10/2023     LDLF 99 05/10/2023     VLDL 25 05/10/2023         RENAL FUNCTION PANEL -         Lab Results   Component Value Date     GLUCOSE 96 07/31/2023      07/31/2023     K 4.5 07/31/2023      07/31/2023     CO2 27 07/31/2023     ANIONGAP " "13 07/31/2023     BUN 25 (H) 07/31/2023     CREATININE 1.45 (H) 07/31/2023     CALCIUM 9.3 07/31/2023     PHOS 2.7 12/17/2022     ALBUMIN 3.8 07/31/2023         No results found for: \"BNP\", \"HGBA1C\"     Last Cardiology Tests:  ECG (10/25/23):  Sinus rhythm, HR 70 bpm.        Echo (2021):     CONCLUSIONS:   1. The left ventricular systolic function is normal with a 60-65% estimated ejection fraction.   2. Spectral Doppler shows an impaired relaxation pattern of left ventricular diastolic filling.   3. The right ventricle is mildly enlarged. There is normal right ventricular global systolic function.   4. There is mild tricuspid regurgitation.   5. Moderately elevated pulmonary artery pressure, estimated RVSP 50mmHg.     Event monitor (11 days - 09/2023):  Patient had a min HR of 52 bpm, max HR of 182 bpm, and avg HR of 83 bpm. Predominant underlying rhythm was Sinus Rhythm. 115 Supraventricular Tachycardia runs occurred, the run with the fastest interval lasting 10 beats with a max rate of 182 bpm, the longest lasting 15 beats with an avg rate of 118 bpm. Some episodes of Supraventricular Tachycardia may be possible Atrial Tachycardia with variable block. Atrial Flutter occurred (<1% burden), ranging from  bpm (avg of 133 bpm), the longest lasting 6 mins 57 secs with an avg rate of 143 bpm. Isolated SVEs were rare (<1.0%), SVE Couplets were rare (<1.0%), and SVE Triplets were rare (<1.0%). Isolated VEs were rare (<1.0%), VE Couplets were rare (<1.0%), and no VE Triplets were present. Ventricular Bigeminy was present.    CT cardiac scoring (10/25/2023):  IMPRESSION:  1. Coronary artery calcium score of 84.8*.      *Coronary Artery  Agatston score      Score  risk  Very low 1-99  Mildly increased 100-299  Moderately increased >300  Moderate to severely increased >800           Assessment/Plan   Hx of PE on Xarelto. Patient had one episode of syncope in July during yoga lasting 2-3 seconds, no warning signs. She " had a near syncopal episode about 3 weeks ago during yoga. Event monitor revealed episodes of atrial flutter (longest lasting ~ 7 minutes) and atrial tach with rates up to 182 bpm. She is currently on Xarelto (hx of PE). CT calcium score of 84 (RCA). Will initiate Flecainide 50mg bid. Will schedule follow up for reassessment, we may consider event monitor then.

## 2023-11-09 NOTE — PROGRESS NOTES
Subjective   Patient ID: Lorie Ding is a 82 y.o. female who presents for Medicare Annual Wellness Visit Subsequent.    HPI     Review of Systems    Objective   There were no vitals taken for this visit.    Physical Exam    Assessment/Plan

## 2023-11-10 ENCOUNTER — OFFICE VISIT (OUTPATIENT)
Dept: PRIMARY CARE | Facility: CLINIC | Age: 83
End: 2023-11-10
Payer: MEDICARE

## 2023-11-10 VITALS
BODY MASS INDEX: 24.16 KG/M2 | HEIGHT: 65 IN | DIASTOLIC BLOOD PRESSURE: 66 MMHG | WEIGHT: 145 LBS | HEART RATE: 74 BPM | SYSTOLIC BLOOD PRESSURE: 149 MMHG

## 2023-11-10 DIAGNOSIS — G31.84 MILD COGNITIVE IMPAIRMENT: ICD-10-CM

## 2023-11-10 DIAGNOSIS — I48.92 ATRIAL FLUTTER, UNSPECIFIED TYPE (MULTI): ICD-10-CM

## 2023-11-10 DIAGNOSIS — M81.8 IDIOPATHIC OSTEOPOROSIS: ICD-10-CM

## 2023-11-10 DIAGNOSIS — Z00.00 ROUTINE GENERAL MEDICAL EXAMINATION AT HEALTH CARE FACILITY: Primary | ICD-10-CM

## 2023-11-10 DIAGNOSIS — I10 BENIGN ESSENTIAL HYPERTENSION: ICD-10-CM

## 2023-11-10 DIAGNOSIS — I47.10 SVT (SUPRAVENTRICULAR TACHYCARDIA) (CMS-HCC): ICD-10-CM

## 2023-11-10 DIAGNOSIS — H02.125 MECHANICAL ECTROPION OF LEFT LOWER EYELID: ICD-10-CM

## 2023-11-10 LAB
ATRIAL RATE: 70 BPM
P AXIS: 73 DEGREES
P OFFSET: 195 MS
P ONSET: 143 MS
PR INTERVAL: 156 MS
Q ONSET: 221 MS
QRS COUNT: 12 BEATS
QRS DURATION: 84 MS
QT INTERVAL: 374 MS
QTC CALCULATION(BAZETT): 403 MS
QTC FREDERICIA: 393 MS
R AXIS: 82 DEGREES
T AXIS: 64 DEGREES
T OFFSET: 408 MS
VENTRICULAR RATE: 70 BPM

## 2023-11-10 PROCEDURE — 1160F RVW MEDS BY RX/DR IN RCRD: CPT | Performed by: INTERNAL MEDICINE

## 2023-11-10 PROCEDURE — 1036F TOBACCO NON-USER: CPT | Performed by: INTERNAL MEDICINE

## 2023-11-10 PROCEDURE — 99213 OFFICE O/P EST LOW 20 MIN: CPT | Performed by: INTERNAL MEDICINE

## 2023-11-10 PROCEDURE — 1159F MED LIST DOCD IN RCRD: CPT | Performed by: INTERNAL MEDICINE

## 2023-11-10 PROCEDURE — 1126F AMNT PAIN NOTED NONE PRSNT: CPT | Performed by: INTERNAL MEDICINE

## 2023-11-10 PROCEDURE — 3078F DIAST BP <80 MM HG: CPT | Performed by: INTERNAL MEDICINE

## 2023-11-10 PROCEDURE — G0439 PPPS, SUBSEQ VISIT: HCPCS | Performed by: INTERNAL MEDICINE

## 2023-11-10 PROCEDURE — 1170F FXNL STATUS ASSESSED: CPT | Performed by: INTERNAL MEDICINE

## 2023-11-10 PROCEDURE — 3077F SYST BP >= 140 MM HG: CPT | Performed by: INTERNAL MEDICINE

## 2023-11-10 ASSESSMENT — ACTIVITIES OF DAILY LIVING (ADL)
DRESSING: INDEPENDENT
TAKING_MEDICATION: INDEPENDENT
MANAGING_FINANCES: INDEPENDENT
GROCERY_SHOPPING: INDEPENDENT
DOING_HOUSEWORK: INDEPENDENT
BATHING: INDEPENDENT

## 2023-11-10 NOTE — PATIENT INSTRUCTIONS
Mild cognitive impairment, now not taking medications correctly, more forgetful, got lost on way to [facility] (but did find your way home) and did not do as well on your memory test.   You need to see DR. Ruben CALLES and lavinia, now on flecainide, follow up with Dr. Jimenez in the spring and will probably repeat the heart monitor    Recurrent pulmonary embolism, on lifetime anticoagulation    Osteoporosis, off fosamax by nephrology, bone density was a little better, continue weight bearing exercise, repeat bone density in 2025    Lower left lid turns out after MOHS surgery, do the massage to see if after the wound heals/flattens, if lid continues to flip outward (as this is causing tearing), see oculoplastics/Dr. Owen Daniel, to see if can be repaired (is a simple outpatient procedure)    Vaccines are all current    Mammogram current, had breast cysts, repeat in 1 year    Stay on oral b12    Recheck in may 2024, get labs prior to appt.

## 2023-11-10 NOTE — PROGRESS NOTES
"Subjective   Reason for Visit: Lorie Ding is an 82 y.o. female here for a Medicare Wellness visit.     Past Medical, Surgical, and Family History reviewed and updated in chart.    Reviewed all medications by prescribing practitioner or clinical pharmacist (such as prescriptions, OTCs, herbal therapies and supplements) and documented in the medical record.    She is still exercising, walks every day, in [place] plays pickle ball, going in January.  No recent falls,   Memory has been good.  No head injury   reports her memory is worse   states she fell out of a chair,   Had heart monitor and saw cardiology and placed on a medication  When he reviewed said was a quick flutter  Has been on flecainide for 2 days.             Patient Care Team:  Shelley Márquez DO as PCP - General  Shelley Márquez DO as PCP - [facility] Attributed Provider     Review of Systems    Objective   Vitals:  /66   Pulse 74   Ht 1.651 m (5' 5\")   Wt 65.8 kg (145 lb)   BMI 24.13 kg/m²       Physical Exam  Constitutional:       Comments: Masked face   Neck:      Vascular: No carotid bruit.   Cardiovascular:      Rate and Rhythm: Normal rate and regular rhythm.      Heart sounds: No murmur heard.  Pulmonary:      Effort: Pulmonary effort is normal.      Breath sounds: Normal breath sounds.   Lymphadenopathy:      Cervical: No cervical adenopathy.   Skin:     Coloration: Skin is not jaundiced or pale.      Comments: Left facial scar noted with ectropion of the left lower lid with tearing   Neurological:      Mental Status: She is alert.      Comments: MMSE 27/30, missed date, town, tree   Psychiatric:         Mood and Affect: Mood normal.         Assessment/Plan   Problem List Items Addressed This Visit    None  Visit Diagnoses       Routine general medical examination at health care facility    -  Primary                 Patient Instructions   Mild cognitive impairment, now not taking medications correctly, more " forgetful, got lost on way to [facility] (but did find your way home) and did not do as well on your memory test.   You need to see DR. Ruben CALLES and lavinia, now on flecainide, follow up with Dr. Jimenez in the spring and will probably repeat the heart monitor    Recurrent pulmonary embolism, on lifetime anticoagulation    Osteoporosis, off fosamax by nephrology, bone density was a little better, continue weight bearing exercise, repeat bone density in 2025    Lower left lid turns out after MOHS surgery, do the massage to see if after the wound heals/flattens, if lid continues to flip outward (as this is causing tearing), see oculoplastics/Dr. Owen Daniel, to see if can be repaired (is a simple outpatient procedure)    Vaccines are all current    Mammogram current, had breast cysts, repeat in 1 year    Stay on oral b12    Recheck in may 2024, get labs prior to appt.

## 2023-11-22 ENCOUNTER — HOSPITAL ENCOUNTER (EMERGENCY)
Facility: HOSPITAL | Age: 83
Discharge: HOME | End: 2023-11-22
Payer: MEDICARE

## 2023-11-22 VITALS
RESPIRATION RATE: 18 BRPM | BODY MASS INDEX: 24.46 KG/M2 | TEMPERATURE: 97.7 F | HEART RATE: 82 BPM | SYSTOLIC BLOOD PRESSURE: 154 MMHG | HEIGHT: 64 IN | WEIGHT: 143.3 LBS | OXYGEN SATURATION: 98 % | DIASTOLIC BLOOD PRESSURE: 66 MMHG
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
## 2023-11-22 DIAGNOSIS — T14.8XXA DEEP WOUND OF SKIN DUE TO AVULSION: ICD-10-CM

## 2023-11-22 PROCEDURE — 2500000005 HC RX 250 GENERAL PHARMACY W/O HCPCS

## 2023-11-22 PROCEDURE — 99285 EMERGENCY DEPT VISIT HI MDM: CPT

## 2023-11-22 RX ADMIN — Medication 1 EACH: at 22:24

## 2023-11-22 ASSESSMENT — COLUMBIA-SUICIDE SEVERITY RATING SCALE - C-SSRS
6. HAVE YOU EVER DONE ANYTHING, STARTED TO DO ANYTHING, OR PREPARED TO DO ANYTHING TO END YOUR LIFE?: NO
1. IN THE PAST MONTH, HAVE YOU WISHED YOU WERE DEAD OR WISHED YOU COULD GO TO SLEEP AND NOT WAKE UP?: NO
2. HAVE YOU ACTUALLY HAD ANY THOUGHTS OF KILLING YOURSELF?: NO

## 2023-11-22 ASSESSMENT — LIFESTYLE VARIABLES
HAVE PEOPLE ANNOYED YOU BY CRITICIZING YOUR DRINKING: NO
EVER HAD A DRINK FIRST THING IN THE MORNING TO STEADY YOUR NERVES TO GET RID OF A HANGOVER: NO
EVER FELT BAD OR GUILTY ABOUT YOUR DRINKING: NO
HAVE YOU EVER FELT YOU SHOULD CUT DOWN ON YOUR DRINKING: NO
REASON UNABLE TO ASSESS: NO

## 2023-11-22 ASSESSMENT — PAIN SCALES - GENERAL: PAINLEVEL_OUTOF10: 0 - NO PAIN

## 2023-11-22 ASSESSMENT — PAIN - FUNCTIONAL ASSESSMENT: PAIN_FUNCTIONAL_ASSESSMENT: 0-10

## 2023-11-23 NOTE — ED PROVIDER NOTES
HPI   Chief Complaint   Patient presents with    Leg Injury     Pt fell today and landed on her lt lower leg and now has large wound to lt leg.       CC: fall left lower leg wound  HPI:   82-year-old female presents ED with wound to the left lower extremity patient stated she tripped or lost her footing or slipped causing a soft tissue avulsion to the left lower extremity, patient is on Eliquis to for prior PEs, she denied having any preceding chest pain, shortness of breath she denied feeling dizzy or lightheaded, patient is scheduled for loop recorder placement by her electrophysiologist next week.  Patient is not complaining of any pain she is able to move all extremities without difficulty.  Spouse reports she has had occasional falls at home she denies any loss of consciousness denies hitting her head, she denies any chest pain or shortness of breath.  Patient notes tetanus is up-to-date    Additional Limitations to History:   External Records Reviewed: I reviewed recent and relevant outside records including   History Obtained From:     Past Medical History: Per HPI  Medications: Reviewed in EMR and with patient  Allergies:  Reviewed in EMR  Past Surgical History:   Social History:     ------------------------------------------------------------------------------------------------------  Physical Exam:  --Vital signs reviewed in nursing triage note, EMR flow sheets, and at patient's bedside  GEN:  A&Ox3, no acute distress, appears comfortable.  Conversational and appropriate.  No confusion or gross mental status changes.  EYES: EOMI, non-injected sclera.  ENT: Moist mucous membranes, no apparent injuries or lesions.   CARDIO: Normal rate and regular rhythm. No murmurs, rubs, or gallops.  2+ equal pulses of the distal extremities.   PULM: Clear to auscultation bilaterally. No rales, rhonchi, or wheezes. Good symmetric chest expansion.  GI: Soft, non-tender, non-distended. No rebound tenderness or  guarding.  SKIN: Left lower extremity there is a 4-1/2 x 6 cm x 4 to 5 mm deep avulsion of the soft tissue with minimal bleeding  MSK: ROM intact the extremities without contractures.   EXT: No peripheral edema, contusions, or wounds.   NEURO: Cranial nerves II-XII grossly intact. Sensation to light touch intact and equal bilaterally in upper and lower extremities.  Symmetric 5/5 strength in upper and lower extremities.  PSYCH: Appropriate mood and behavior, converses and responds appropriately during exam.  -------------------------------------------------------------------------------------------------------      Differential Diagnoses Considered:   Chronic Medical Conditions Significantly Affecting Care:   Diagnostic testing considered: [PERC, D-Dimer, PECARN, etc.]      - I independently interpreted: [CXR, CT, POCUS, etc. including your interpretation]  - Labs notable for     Escalation of Care: Appropriate for   Social Determinants of Health Significantly Affecting Care: [Homelessness, lacking transportation, uninsured, unable to afford medications]  Prescription Drug Consideration: [Antibiotics, antivirals, pain medications, etc.]  Discussion of Management with Other Providers:  I discussed the patient/results with: [admitting team, consultant, radiologist, social work, EPAT, case management, PT/OT, RT, PCP, etc.]      Dann Angel PA-C                          No data recorded                Patient History   Past Medical History:   Diagnosis Date    Encounter for gynecological examination (general) (routine) without abnormal findings     Encounter for routine pelvic examination    Other conditions influencing health status     Screening for malignant neoplasms, colon     Past Surgical History:   Procedure Laterality Date    OTHER SURGICAL HISTORY  05/31/2013    Surgery Excision Of Parotid Tumor/Gland    OTHER SURGICAL HISTORY  05/31/2013    Spinal Diskectomy Lumbar    TONSILLECTOMY  05/31/2013     Tonsillectomy     Family History   Problem Relation Name Age of Onset    Colon cancer Mother      Other (Old Age) Father       Social History     Tobacco Use    Smoking status: Never    Smokeless tobacco: Never   Substance Use Topics    Alcohol use: Yes     Alcohol/week: 7.0 standard drinks of alcohol     Types: 7 Glasses of wine per week    Drug use: Never       Physical Exam   ED Triage Vitals [11/22/23 2143]   Temp Heart Rate Resp BP   36 °C (96.8 °F) 81 16 148/72      SpO2 Temp src Heart Rate Source Patient Position   96 % -- -- --      BP Location FiO2 (%)     -- --       Physical Exam    ED Course & MDM   Diagnoses as of 11/22/23 2214   Fall in home, initial encounter   Deep wound of skin due to avulsion       Medical Decision Making  82-year-old female status post mechanical fall at home on Eliquis no head injury, soft tissue avulsion to the left lower extremity with minimal bleeding Gelfoam placed, with sterile dressing, patient given instructions for wound care at home and to follow-up with wound care        Procedure  Procedures     Dann Angel PA-C  11/22/23 2214

## 2023-11-30 ENCOUNTER — HOSPITAL ENCOUNTER (OUTPATIENT)
Facility: HOSPITAL | Age: 83
Setting detail: OUTPATIENT SURGERY
Discharge: HOME | End: 2023-11-30
Attending: STUDENT IN AN ORGANIZED HEALTH CARE EDUCATION/TRAINING PROGRAM | Admitting: STUDENT IN AN ORGANIZED HEALTH CARE EDUCATION/TRAINING PROGRAM
Payer: MEDICARE

## 2023-11-30 VITALS
SYSTOLIC BLOOD PRESSURE: 140 MMHG | HEART RATE: 85 BPM | HEIGHT: 64 IN | WEIGHT: 143.3 LBS | DIASTOLIC BLOOD PRESSURE: 62 MMHG | RESPIRATION RATE: 18 BRPM | BODY MASS INDEX: 24.46 KG/M2 | OXYGEN SATURATION: 94 %

## 2023-11-30 DIAGNOSIS — R55 SYNCOPE: ICD-10-CM

## 2023-11-30 PROCEDURE — 2780000003 HC OR 278 NO HCPCS: Performed by: STUDENT IN AN ORGANIZED HEALTH CARE EDUCATION/TRAINING PROGRAM

## 2023-11-30 PROCEDURE — 33285 INSJ SUBQ CAR RHYTHM MNTR: CPT | Performed by: STUDENT IN AN ORGANIZED HEALTH CARE EDUCATION/TRAINING PROGRAM

## 2023-11-30 PROCEDURE — 96372 THER/PROPH/DIAG INJ SC/IM: CPT | Performed by: STUDENT IN AN ORGANIZED HEALTH CARE EDUCATION/TRAINING PROGRAM

## 2023-11-30 PROCEDURE — 7100000010 HC PHASE TWO TIME - EACH INCREMENTAL 1 MINUTE: Performed by: STUDENT IN AN ORGANIZED HEALTH CARE EDUCATION/TRAINING PROGRAM

## 2023-11-30 PROCEDURE — 2500000005 HC RX 250 GENERAL PHARMACY W/O HCPCS: Performed by: STUDENT IN AN ORGANIZED HEALTH CARE EDUCATION/TRAINING PROGRAM

## 2023-11-30 PROCEDURE — C1764 EVENT RECORDER, CARDIAC: HCPCS | Performed by: STUDENT IN AN ORGANIZED HEALTH CARE EDUCATION/TRAINING PROGRAM

## 2023-11-30 PROCEDURE — 7100000009 HC PHASE TWO TIME - INITIAL BASE CHARGE: Performed by: STUDENT IN AN ORGANIZED HEALTH CARE EDUCATION/TRAINING PROGRAM

## 2023-11-30 DEVICE — IMPLANTABLE DEVICE: Type: IMPLANTABLE DEVICE | Site: CHEST | Status: FUNCTIONAL

## 2023-11-30 RX ORDER — LIDOCAINE HYDROCHLORIDE AND EPINEPHRINE 10; 10 MG/ML; UG/ML
INJECTION, SOLUTION INFILTRATION; PERINEURAL AS NEEDED
Status: DISCONTINUED | OUTPATIENT
Start: 2023-11-30 | End: 2023-11-30 | Stop reason: HOSPADM

## 2023-11-30 ASSESSMENT — COLUMBIA-SUICIDE SEVERITY RATING SCALE - C-SSRS
2. HAVE YOU ACTUALLY HAD ANY THOUGHTS OF KILLING YOURSELF?: NO
1. IN THE PAST MONTH, HAVE YOU WISHED YOU WERE DEAD OR WISHED YOU COULD GO TO SLEEP AND NOT WAKE UP?: NO
6. HAVE YOU EVER DONE ANYTHING, STARTED TO DO ANYTHING, OR PREPARED TO DO ANYTHING TO END YOUR LIFE?: NO

## 2023-11-30 ASSESSMENT — PAIN SCALES - GENERAL
PAINLEVEL_OUTOF10: 0 - NO PAIN

## 2023-11-30 ASSESSMENT — PAIN - FUNCTIONAL ASSESSMENT
PAIN_FUNCTIONAL_ASSESSMENT: 0-10

## 2023-11-30 NOTE — DISCHARGE INSTRUCTIONS
Keep incision clean and dry.  Remove dressing in 48hrs.  Do not get incision site wet until edges appear healed, approximately 7 days.  Do not get into any standing water for 1 week.  Do not remove steri strips. They will fall off on their own as showering occurs.

## 2023-11-30 NOTE — POST-PROCEDURE NOTE
Physician Transition of Care Summary  Invasive Cardiovascular Lab    Procedure Date: 11/30/2023  Attending:    * Abel Chong - Primary  Resident/Fellow/Other Assistant: Surgeon(s) and Role:    Indications:   Pre-op Diagnosis     * Syncope [R55]    Post-procedure diagnosis:   Post-op Diagnosis     * Syncope [R55]    Procedure(s):     * Loop Insertion      Procedure Findings:   See below    Description of the Procedure:   Implantable Loop Recorder placement    Procedures:  Placement of Implantable Loop Recorder ([id number]).     Patient history:  Please refer to the detailed history and physical on the patient's medical chart.     Procedure narrative:  Indication for Implant: Syncope  Subcutaneous tissues were infused with Lidocaine 2% with Epi anesthesia.  An incision was made in the left parasternal area in the 4th intercostal space.   An implantable cardiac event recorder was inserted subcutaneously using the insertion tool, and excellent intracardiac recordings were obtained.   The skin was approximated with skin adhesive and steri strips. A sterile dressing was applied.   Pt tolerated the procedure well and was transferred to recovery with plan to discharge.   Device was paired to home use.      Summary:  Successful placement of a [employer] Implantable Loop recorder    Recommendations:  Tentatively patient will be discharged Today, following bed rest and subsequent ambulation, provided the recovery parameters are appropriate.     Patient Instructions:  No showering for 24hrs  Remove Bandage in 24-48 hours  Avoid submerging in water, swimming bath tubs etc for 2-3 days  Allow steristrips underneath to fall off naturally.  Please call our office if you notice any discharge or swelling around incision or fever.      See complete procedural log and parameters.     Complications:   None    Estimated Blood Loss:   0 mL    Anesthesia: Moderate Sedation Anesthesia Staff: No anesthesia staff entered.    Any Specimen(s)  Removed:   No specimens collected during this procedure.    Disposition:   Home      Electronically signed by: Abel Jimenez MD, 11/30/2023 1:17 PM

## 2023-12-01 ENCOUNTER — HOSPITAL ENCOUNTER (OUTPATIENT)
Dept: CARDIOLOGY | Facility: CLINIC | Age: 83
Discharge: HOME | End: 2023-12-01
Payer: MEDICARE

## 2023-12-01 DIAGNOSIS — R55 SYNCOPE AND COLLAPSE: ICD-10-CM

## 2023-12-07 ENCOUNTER — HOSPITAL ENCOUNTER (OUTPATIENT)
Dept: CARDIOLOGY | Facility: CLINIC | Age: 83
Discharge: HOME | End: 2023-12-07
Payer: MEDICARE

## 2023-12-07 DIAGNOSIS — R55 SYNCOPE AND COLLAPSE: ICD-10-CM

## 2023-12-11 ENCOUNTER — LAB (OUTPATIENT)
Dept: LAB | Facility: LAB | Age: 83
End: 2023-12-11
Payer: MEDICARE

## 2023-12-11 ENCOUNTER — TELEPHONE (OUTPATIENT)
Dept: NEPHROLOGY | Facility: CLINIC | Age: 83
End: 2023-12-11
Payer: MEDICARE

## 2023-12-11 DIAGNOSIS — N18.32 STAGE 3B CHRONIC KIDNEY DISEASE (MULTI): ICD-10-CM

## 2023-12-11 DIAGNOSIS — N18.32 STAGE 3B CHRONIC KIDNEY DISEASE (MULTI): Primary | ICD-10-CM

## 2023-12-11 LAB
ALBUMIN SERPL BCP-MCNC: 4 G/DL (ref 3.4–5)
ANION GAP SERPL CALC-SCNC: 16 MMOL/L (ref 10–20)
BUN SERPL-MCNC: 17 MG/DL (ref 6–23)
CALCIUM SERPL-MCNC: 9.6 MG/DL (ref 8.6–10.6)
CHLORIDE SERPL-SCNC: 105 MMOL/L (ref 98–107)
CO2 SERPL-SCNC: 26 MMOL/L (ref 21–32)
CREAT SERPL-MCNC: 1.37 MG/DL (ref 0.5–1.05)
CREAT UR-MCNC: 103.6 MG/DL (ref 20–320)
ERYTHROCYTE [DISTWIDTH] IN BLOOD BY AUTOMATED COUNT: 12.9 % (ref 11.5–14.5)
FERRITIN SERPL-MCNC: 162 NG/ML (ref 8–150)
GFR SERPL CREATININE-BSD FRML MDRD: 39 ML/MIN/1.73M*2
GLUCOSE SERPL-MCNC: 144 MG/DL (ref 74–99)
HCT VFR BLD AUTO: 40.4 % (ref 36–46)
HGB BLD-MCNC: 14 G/DL (ref 12–16)
IRON SATN MFR SERPL: 21 % (ref 25–45)
IRON SERPL-MCNC: 73 UG/DL (ref 35–150)
MCH RBC QN AUTO: 37 PG (ref 26–34)
MCHC RBC AUTO-ENTMCNC: 34.7 G/DL (ref 32–36)
MCV RBC AUTO: 107 FL (ref 80–100)
MICROALBUMIN UR-MCNC: 13.8 MG/L
MICROALBUMIN/CREAT UR: 13.3 UG/MG CREAT
NRBC BLD-RTO: 0 /100 WBCS (ref 0–0)
PHOSPHATE SERPL-MCNC: 2.7 MG/DL (ref 2.5–4.9)
PLATELET # BLD AUTO: 359 X10*3/UL (ref 150–450)
POTASSIUM SERPL-SCNC: 4.5 MMOL/L (ref 3.5–5.3)
RBC # BLD AUTO: 3.78 X10*6/UL (ref 4–5.2)
SODIUM SERPL-SCNC: 142 MMOL/L (ref 136–145)
TIBC SERPL-MCNC: 346 UG/DL (ref 240–445)
UIBC SERPL-MCNC: 273 UG/DL (ref 110–370)
WBC # BLD AUTO: 7.6 X10*3/UL (ref 4.4–11.3)

## 2023-12-11 PROCEDURE — 85027 COMPLETE CBC AUTOMATED: CPT

## 2023-12-11 PROCEDURE — 36415 COLL VENOUS BLD VENIPUNCTURE: CPT

## 2023-12-11 PROCEDURE — 82570 ASSAY OF URINE CREATININE: CPT

## 2023-12-11 PROCEDURE — 80069 RENAL FUNCTION PANEL: CPT

## 2023-12-11 PROCEDURE — 83970 ASSAY OF PARATHORMONE: CPT

## 2023-12-11 PROCEDURE — 83550 IRON BINDING TEST: CPT

## 2023-12-11 PROCEDURE — 82043 UR ALBUMIN QUANTITATIVE: CPT

## 2023-12-11 PROCEDURE — 83540 ASSAY OF IRON: CPT

## 2023-12-11 PROCEDURE — 82728 ASSAY OF FERRITIN: CPT

## 2023-12-12 DIAGNOSIS — S51.809A: ICD-10-CM

## 2023-12-12 LAB — PTH-INTACT SERPL-MCNC: 93.4 PG/ML (ref 18.5–88)

## 2023-12-12 NOTE — PROGRESS NOTES
Orders Placed This Encounter   Procedures    Referral to Wound Clinic     Standing Status:   Future     Standing Expiration Date:   6/12/2024     Referral Priority:   Routine     Referral Type:   Consultation     Referral Reason:   Specialty Services Required     Number of Visits Requested:   1

## 2023-12-13 ENCOUNTER — APPOINTMENT (OUTPATIENT)
Dept: CARDIOLOGY | Facility: CLINIC | Age: 83
End: 2023-12-13
Payer: MEDICARE

## 2023-12-13 DIAGNOSIS — I26.09 OTHER PULMONARY EMBOLISM WITH ACUTE COR PULMONALE (MULTI): ICD-10-CM

## 2023-12-13 DIAGNOSIS — I10 HYPERTENSION, UNSPECIFIED TYPE: ICD-10-CM

## 2023-12-13 NOTE — TELEPHONE ENCOUNTER
Requested Prescriptions     Pending Prescriptions Disp Refills    rivaroxaban (Xarelto) 20 mg tablet 90 tablet 1     Sig: Take 1 tablet (20 mg) by mouth once daily. Take with food.    losartan (Cozaar) 50 mg tablet 90 tablet 1     Sig: Take 1 tablet (50 mg) by mouth once daily.

## 2023-12-14 ENCOUNTER — OFFICE VISIT (OUTPATIENT)
Dept: WOUND CARE | Facility: HOSPITAL | Age: 83
End: 2023-12-14
Payer: MEDICARE

## 2023-12-14 ENCOUNTER — OFFICE VISIT (OUTPATIENT)
Dept: NEPHROLOGY | Facility: CLINIC | Age: 83
End: 2023-12-14
Payer: MEDICARE

## 2023-12-14 ENCOUNTER — OFFICE VISIT (OUTPATIENT)
Dept: NEUROLOGY | Facility: CLINIC | Age: 83
End: 2023-12-14
Payer: MEDICARE

## 2023-12-14 VITALS
RESPIRATION RATE: 16 BRPM | HEART RATE: 95 BPM | OXYGEN SATURATION: 99 % | HEIGHT: 65 IN | TEMPERATURE: 96.9 F | SYSTOLIC BLOOD PRESSURE: 111 MMHG | WEIGHT: 147.6 LBS | BODY MASS INDEX: 24.59 KG/M2 | DIASTOLIC BLOOD PRESSURE: 64 MMHG

## 2023-12-14 DIAGNOSIS — G20.C PARKINSONISM, UNSPECIFIED PARKINSONISM TYPE (MULTI): ICD-10-CM

## 2023-12-14 DIAGNOSIS — N18.32 STAGE 3B CHRONIC KIDNEY DISEASE (MULTI): Primary | ICD-10-CM

## 2023-12-14 DIAGNOSIS — I10 BENIGN ESSENTIAL HYPERTENSION: ICD-10-CM

## 2023-12-14 DIAGNOSIS — R46.89 BEHAVIORAL CHANGE: ICD-10-CM

## 2023-12-14 DIAGNOSIS — R55 SYNCOPE, UNSPECIFIED SYNCOPE TYPE: Primary | ICD-10-CM

## 2023-12-14 DIAGNOSIS — S51.809A: ICD-10-CM

## 2023-12-14 DIAGNOSIS — E55.9 VITAMIN D DEFICIENCY: ICD-10-CM

## 2023-12-14 PROCEDURE — 1036F TOBACCO NON-USER: CPT | Performed by: SURGERY

## 2023-12-14 PROCEDURE — 11042 DBRDMT SUBQ TIS 1ST 20SQCM/<: CPT | Mod: PO

## 2023-12-14 PROCEDURE — 1159F MED LIST DOCD IN RCRD: CPT | Performed by: INTERNAL MEDICINE

## 2023-12-14 PROCEDURE — 1036F TOBACCO NON-USER: CPT | Performed by: INTERNAL MEDICINE

## 2023-12-14 PROCEDURE — 11045 DBRDMT SUBQ TISS EACH ADDL: CPT | Performed by: SURGERY

## 2023-12-14 PROCEDURE — 99215 OFFICE O/P EST HI 40 MIN: CPT | Performed by: PSYCHIATRY & NEUROLOGY

## 2023-12-14 PROCEDURE — 99215 OFFICE O/P EST HI 40 MIN: CPT | Performed by: INTERNAL MEDICINE

## 2023-12-14 PROCEDURE — 3074F SYST BP LT 130 MM HG: CPT | Performed by: INTERNAL MEDICINE

## 2023-12-14 PROCEDURE — 1160F RVW MEDS BY RX/DR IN RCRD: CPT | Performed by: PSYCHIATRY & NEUROLOGY

## 2023-12-14 PROCEDURE — 1126F AMNT PAIN NOTED NONE PRSNT: CPT | Performed by: PSYCHIATRY & NEUROLOGY

## 2023-12-14 PROCEDURE — 1036F TOBACCO NON-USER: CPT | Performed by: PSYCHIATRY & NEUROLOGY

## 2023-12-14 PROCEDURE — 11045 DBRDMT SUBQ TISS EACH ADDL: CPT | Mod: PO

## 2023-12-14 PROCEDURE — 11042 DBRDMT SUBQ TIS 1ST 20SQCM/<: CPT | Performed by: SURGERY

## 2023-12-14 PROCEDURE — 1159F MED LIST DOCD IN RCRD: CPT | Performed by: SURGERY

## 2023-12-14 PROCEDURE — 1160F RVW MEDS BY RX/DR IN RCRD: CPT | Performed by: SURGERY

## 2023-12-14 PROCEDURE — 1126F AMNT PAIN NOTED NONE PRSNT: CPT | Performed by: INTERNAL MEDICINE

## 2023-12-14 PROCEDURE — 99213 OFFICE O/P EST LOW 20 MIN: CPT | Performed by: SURGERY

## 2023-12-14 PROCEDURE — 99213 OFFICE O/P EST LOW 20 MIN: CPT | Mod: PO,25

## 2023-12-14 PROCEDURE — 1126F AMNT PAIN NOTED NONE PRSNT: CPT | Performed by: SURGERY

## 2023-12-14 PROCEDURE — 1160F RVW MEDS BY RX/DR IN RCRD: CPT | Performed by: INTERNAL MEDICINE

## 2023-12-14 PROCEDURE — 1159F MED LIST DOCD IN RCRD: CPT | Performed by: PSYCHIATRY & NEUROLOGY

## 2023-12-14 PROCEDURE — 3078F DIAST BP <80 MM HG: CPT | Performed by: INTERNAL MEDICINE

## 2023-12-14 RX ORDER — LOSARTAN POTASSIUM 50 MG/1
50 TABLET ORAL DAILY
Qty: 90 TABLET | Refills: 1 | Status: SHIPPED | OUTPATIENT
Start: 2023-12-14

## 2023-12-14 NOTE — PATIENT INSTRUCTIONS
Dear TEJAS   It was nice seeing you in the nephrology clinic today     Today we discussed the following:     #Chronic kidney disease stage III-your kidney function is stable recently at 35% improved up to 39%     #Hypertension-blood pressure is  In good control. Continue losartan 50 mg daily     #Limit salt intake. Ensure 40-50 ounces of fluid intake daily     #Vitamin D deficiency-continue vitamin D daily     #Osteoporosis-exercise regularly.  holding alendronate    # Recurrent fainting episodes-getting evaluated by cardiology and neurology    # Trace leg swelling-will defer starting water pill at this time will monitor     Follow-up in 6 months with repeat blood work and urinalysis prior to next visit     Please call our office if you have any question  Thank you for coming to see me today     Shweta Garcia MD, MS, FRED LOMAS  Clinical  - [employer]  Nephrologist - [facility]

## 2023-12-14 NOTE — PROGRESS NOTES
Chief complaint:    Episodes of losing consciousness and falling    History of Present Illness:   Lorie is here with her  Brett and sons Jose Antonio and Benson. Lorie has had multiple episodes of loss of consciousness with or without falling this year, which probably started in 2021. She has had three episodes at chair yoga. Brett witnessed one and said that they had just finished doing multiple squats and sat down. He could tell that Lorie was losing consciousness and prevented her from falling. She got back to normal fairly quickly. She had zio patch monitoring and saw EP. She has an implanted loop recorder now and started flecainide. This past week at [employer] she was standing and singing. Brett noticed that she took off her coat and pulled at her turtleneck because she seemed hot. She then appeared to faint. He caught her and sat her down. She would not lie down. She seemed to go in and out of consciousness for 3-4 minutes. EMS was called and by the time they came, she refused the wheelchair and walked back to their truck and had her vital signs checked, which were normal. She seemed entirely back to normal. The cardiologist told them that her loop recorder did not  any abnormality at that time.    A few weeks ago she apparently fell and badly abraded her left ankle, which bled profusely. Brett took her to an ER. She is now under the care of a wound clinic. The fall was unwitnessed.    Lorie said she did not feel lightheaded or dizzy when she stood up out of bed or from a chair. She is not aware of any memory problems or difficulty walking. She does not have pain in her left ankle. Brett makes sure she takes her medications.     She continues to have memory problems and personality change, not wanting to do things she used to enjoy. She no longer drives. Brett watches her closely, especially when she showers. He has noticed that sometimes she has a hand tremor. She has not had urinary incontinence or digestive issues.      Physical examination:  She is a very pleasant, quiet woman with a masked facies.  Lying /70, P68  Sitting /70, P 80  Standing /68, P92  She did not have symptoms with position change.     Neurologic Exam     Mental Status   Her speech was soft, clear, fluent and appropriate. She was unable to provide any history regarding recent falls. The only medication she could recall taking was Xarelto.     Cranial Nerves     CN III, IV, VI   Extraocular motions are normal.     CN VII   Right facial weakness: none  Left facial weakness: none    Motor Exam Slight rigidity of the upper limbs. No tremor noted and she could tap her fingers fairly briskly bilaterally.      Sensory Exam   Light touch normal.     Gait, Coordination, and Reflexes     Reflexes   Right brachioradialis: 3+  Left brachioradialis: 3+  Right biceps: 3+  Left biceps: 3+  Right triceps: 3+  Left triceps: 3+  Right patellar: 3+  Left patellar: 3+  Right achilles: 2+  Left achilles: 2+  She walked on her own slowly with a narrow-based gait but with mildly stooped posture and little arm swing.           1. Syncope, unspecified syncope type  Autonomic Testing    Vascular US Carotid Artery Duplex Bilateral    Referral to Neurology      2. Parkinsonism, unspecified Parkinsonism type  Autonomic Testing    Referral to Neurology      3. Behavioral change               Orders Placed This Encounter   Procedures    Referral to Neurology     Standing Status:   Future     Standing Expiration Date:   6/14/2024     Referral Priority:   Routine     Referral Type:   Consultation     Referral Reason:   Specialty Services Required     Requested Specialty:   Neurology     Number of Visits Requested:   1    Autonomic Testing     Standing Status:   Future     Standing Expiration Date:   12/14/2024     Order Specific Question:   Indications     Answer:   Syncope/Presyncope     Order Specific Question:   Type of Testing     Answer:   Routine Autonomic Testing  (includes Tilt Table Test, Heart Rate Variability, Valsalva maneuver and QSART)     Order Specific Question:   Does the patient have a pacemaker?     Answer:   No     Order Specific Question:   Is the patient pregnant?     Answer:   No     Order Specific Question:   Weight > 300 lbs?     Answer:   No     Order Specific Question:   Medication list reviewed and patient informed of necessity to stop certain medications prior to the test?     Answer:   No     Order Specific Question:   Release result to [facility]     Answer:   Immediate [1]          Impression and Plan:  Lorie has developed memory loss, personality change, Parkinsonism and has had multiple episodes of syncope, apparently with no clear associated cardiac arrhythmia. Today she had orthostatic blood pressure drop without symptoms, suggestive of autonomic dysfunction. She is on losartan for hypertension and I will ask her nephrologist if she has recommendations about how to prevent syncope. We will have her get a tilt table test, carotid ultrasound and have her consult a movement disorder specialist. I will notify her and her  of the test results. Follow up will then be arranged.     Leanna Miranda MD

## 2023-12-14 NOTE — PROGRESS NOTES
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  We followed conservative measures.  Today, Lorie came with her  and her son.  No kidney related complaints or concerns.  Recent episodes of recurrent fainting-under cardiology/neurology evaluation.  She received internal cardiac monitor.  She denies palpitation today.  She denies shortness of breath or chest pain.  Blood pressure is excellent.  Recent blood work that showed stable improved serum creatinine and GFR.  Is normal electrolytes.  No albuminuria    Prior notes     Last office visit November 2022. In interim, she was admitted to the hospital in December 2022 after syncopal episode and was found to have COVID-19 and acute kidney injury. Today, she came with her . No kidney related concerns or complaints. She had blood work done recently and all results were discussed with him in details including serum creatinine back to baseline and within normal extra lites. Blood pressure is accepted today. No lower urinary tract symptoms     Per prior notes     Last initial office visit was August 2022. We discussed conservative measures. We continued RAAS inhibitor. We encouraged increased fluid intake and limit salt intake. We recommended holding alendronate temporarily. In interim, she feels well. She had blood work done recently and all results were discussed with her and her  in detail today including stable serum creatinine 1.3 and GFR 38 and within normal electrolytes including calcium. Vitamin D is slightly low-currently not on vitamin D supplements. Urinalysis bland with no blood or albumin. Lorie came today with her  and all questions were answered. They are planning to go to [place] over the winter       "  Per prior notes     Lorie came today with her . She reports feeling in her baseline state of health. She denies any urinary tract issues. She denies chest pain or shortness of breath. She follows low-salt diet. She thinks she is not drinking much fluid. She has been on bisphosphonate for a year. She denies blood or foam in the urine. Urine dipstick today is bland. Blood pressure is accepted. No significant NSAID use at home     Family history: No kidney issues run in the family  Social history: . Used to work as an , non-smoker       Objective   /64 (BP Location: Right arm, Patient Position: Standing, BP Cuff Size: Adult)   Pulse 95   Temp 36.1 °C (96.9 °F)   Resp 16   Ht 1.651 m (5' 5\")   Wt 67 kg (147 lb 9.6 oz)   SpO2 99%   BMI 24.56 kg/m²   Wt Readings from Last 3 Encounters:   12/14/23 67 kg (147 lb 9.6 oz)   11/30/23 65 kg (143 lb 4.8 oz)   11/22/23 65 kg (143 lb 4.8 oz)       Physical Exam    General appearance: no distress awake and alert on room air, euvolemic on exam  Eyes: non-icteric  HEENT: atrumatic head, PEERLA, moist mucosa  Skin: no apparent rash  Heart: NSR, S1, S2 normal, no murmur or gallop.  Internal heart monitor noticed  Lungs: Symmetrical expansion,CTA bilat no wheezing/crackles  Abdomen: soft, nt/nd, obese  Extremities: Trace edema bilateral, left shin wound with dressing applied  Neuro: No FND,asterixis, no focal deficits noticed        Review of Systems     Constitutional: no fever, no chills, no recent weight gain and no recent weight loss.   Eyes: no blurred vision and no diplopia.   ENT: no hearing loss, no earache, no sore throat, no swollen glands in the neck and no nasal discharge.   Cardiovascular: no chest pain, no palpitations and no lower extremity edema.   Respiratory: no shortness of breath, no chronic cough and no shortness of breath during exertion.   Gastrointestinal: no abdominal pain, no constipation, no heartburn, no " "vomiting, no bloody stools and no change in bowel movements.   Genitourinary: no dysuria and no hematuria.   Musculoskeletal: no arthralgias and no myalgias.   Skin: no rashes and no skin lesions.   Neurological: no headaches and no dizziness.   Psychiatric: no confusion, no depression and no anxiety.   Endocrine: no heat intolerance, no cold intolerance, appetite not increased, no thyroid disorder, no increased urinary frequency and no dry skin.   Hematologic/Lymphatic: does not bleed easily and does not bruise easily.   All other systems have been reviewed and are negative for complaint.         Data Review        Results from last 7 days   Lab Units 12/11/23  1339   WBC AUTO x10*3/uL 7.6   HEMOGLOBIN g/dL 14.0   HEMATOCRIT % 40.4   PLATELETS AUTO x10*3/uL 359            No results found for: \"URICACID\"        No results found for: \"HGBA1C\"        Results from last 7 days   Lab Units 12/11/23  1339   SODIUM mmol/L 142   POTASSIUM mmol/L 4.5   CHLORIDE mmol/L 105   CO2 mmol/L 26   BUN mg/dL 17   GLUCOSE mg/dL 144*   CALCIUM mg/dL 9.6   ANION GAP mmol/L 16   EGFR mL/min/1.73m*2 39*           Albumin/Creatine Ratio   Date Value Ref Range Status   12/11/2023 13.3 <30.0 ug/mg Creat Final   06/07/2023 5.0 0.0 - 30.0 ug/mg crt Final   11/28/2022 4.2 0.0 - 30.0 ug/mg crt Final            RFP  Recent Labs     12/11/23  1339 07/31/23  1642 05/10/23  1501 12/17/22  0619 12/16/22  2154 11/28/22  1330 07/07/22  1152    138 140 137 136 140 141   K 4.5 4.5 4.7 3.9 4.0 4.6 4.9    103 104 107 101 104 106   CO2 26 27 26 23 27 26 28   BUN 17 25* 19 16 18 17 23   CREATININE 1.37* 1.45* 1.36* 1.12* 1.53* 1.38* 1.39*   GLUCOSE 144* 96 100* 98 94 129* 110*   CALCIUM 9.6 9.3 9.7 8.5* 9.5 9.9 9.7   PHOS 2.7  --   --  2.7  --  2.9  --    EGFR 39*  --   --   --   --   --   --    ANIONGAP 16 13 15 11 12 15 12        Urineanalysis  Recent Labs     07/31/23  1841 12/16/22  2330 11/28/22  1330   COLORU STRAW YELLOW YELLOW "   APPEARANCEU CLEAR CLEAR CLEAR   SPECGRAVU 1.013 1.020 >1.030*   NANCY 6.0 5.5 5.5   PROTUR NEGATIVE NEGATIVE NEGATIVE   GLUCOSEU NEGATIVE NEGATIVE NEGATIVE   BLOODU NEGATIVE TRACE* NEGATIVE   KETONESU 5(Trace)* TRACE* NEGATIVE   BILIRUBINU NEGATIVE NEGATIVE NEGATIVE   NITRITEU NEGATIVE NEGATIVE NEGATIVE   LEUKOCYTESU NEGATIVE TRACE* NEGATIVE       Urine Electrolytes  Recent Labs     12/11/23  1339 07/31/23  1841 06/07/23  1625 12/16/22  2330 11/28/22  1330   CREATU 103.6  --  212.0  --  185.0   PROTUR  --  NEGATIVE  --  NEGATIVE NEGATIVE   ALBUMINUR 13.8  --   --   --   --    MICROALBCREA 13.3  --  5.0  --  4.2        Urine Micro  Recent Labs     12/16/22  2330   WBCU 2   RBCU <1   SQUAMEPIU 1   BACTERIAU 1+*   MUCUSU FEW        Iron  Recent Labs     12/11/23  1339 12/16/22  2154   IRON 73  --    TIBC 346  --    IRONSAT 21*  --    FERRITIN 162* 151*            Current Outpatient Medications:     cholecalciferol (Vitamin D-3) 50 mcg (2,000 unit) capsule, Take 1 capsule (50 mcg) by mouth once daily., Disp: , Rfl:     cyanocobalamin, vitamin B-12, (Vitamin B-12) 1,000 mcg tablet extended release, Take 1 tablet (1,000 mcg) by mouth once daily. as directed, Disp: , Rfl:     flecainide (Tambocor) 50 mg tablet, Take 1 tablet (50 mg) by mouth 2 times a day., Disp: 240 tablet, Rfl: 2    losartan (Cozaar) 50 mg tablet, Take 1 tablet (50 mg) by mouth once daily., Disp: 90 tablet, Rfl: 1    rivaroxaban (Xarelto) 20 mg tablet, Take 1 tablet (20 mg) by mouth once daily. 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 Most recent GFR 38 mils per minute per 1.73 mÂ².  Serum creatinine has been stable over the last year or so. Acute kidney injury December 2022-resolved serum creatinine peaked 1.5 and GFR down to 30. I am concerned about possible bisphosphonate induced renal toxicity albeit her urine dipstick was bland-currently bisphosphonate is on hold. I counseled regarding conservative measures, limit salt intake, increase fluid intake and temporary continue to hold bisphosphonate. Will monitor closely  -Kidney ultrasound is unremarkable in June 2022  -Urine analysis is bland with no albumin or blood.  Negative spot test ACR     #Hypertension-accepted  -Current medication losartan 50 mg-we will continue to changes     #CKD/MBD  -She has a diagnosis of osteoporosis on alendronate 70 mg weekly-currently on hold  -Vitamin D is low-continue D3 supplements within normal phosphorus, calcium, albumin, PTH.      #No significant anemia hemoglobin 13.7. 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 [employer]  Nephrologist - [facility]

## 2023-12-14 NOTE — PATIENT INSTRUCTIONS
Lorie has had multiple episodes of fainting (syncope). Her blood pressure drops when she goes from lying to standing although she does not have symptoms. She may have autonomic dysfunction in addition to memory problems and signs of Parkinson's disease. We will obtain a tilt table test and look at the blood flow to her brain with an ultrasound. We will also have her consult a movement disorder specialist. I will speak to her kidney doctor about how to approach decreasing episodes of low blood pressure while she has high blood pressure and kidney problems. I will let you know what we decide.

## 2023-12-15 ENCOUNTER — TELEPHONE (OUTPATIENT)
Dept: NEUROLOGY | Facility: CLINIC | Age: 83
End: 2023-12-15
Payer: MEDICARE

## 2023-12-15 NOTE — TELEPHONE ENCOUNTER
I spoke to Dr. Garcia, Lorie's nephrologist first about Lorie's recurrent syncope and orthostatic blood pressure changes in addition to Lorie's hypertension and stage 3 kidney disease. Dr. Garcia recommended increased salt and water intake but also continuing losartan. Dr. Garcia recommended limiting salt to 2g but it is not clear if Lorie will comply. Lorie is a picky eater according to her family and eats Cheezits and Cheetohs daily. Dr. Garcia also recommended BP checks sitting and then two minutes after standing. He should record Lorie's results for two weeks and then contact her with the results. Compression stockings may also help.     I relayed these recommendations to Lorie's  Brett. He said that they put on a compression stocking on her left leg at the wound clinic, and she will continue to use that. He will also get a pair of compression stockings that she can start using on just her right leg until the left heals.    Consult with Dr. Chatman scheduled for next week as well as carotid ultrasound. Tilt table testing will also be scheduled.

## 2023-12-18 ENCOUNTER — HOSPITAL ENCOUNTER (OUTPATIENT)
Dept: CARDIOLOGY | Facility: CLINIC | Age: 83
Discharge: HOME | End: 2023-12-18
Payer: MEDICARE

## 2023-12-18 DIAGNOSIS — R55 SYNCOPE AND COLLAPSE: ICD-10-CM

## 2023-12-20 ENCOUNTER — OFFICE VISIT (OUTPATIENT)
Dept: NEUROLOGY | Facility: CLINIC | Age: 83
End: 2023-12-20
Payer: MEDICARE

## 2023-12-20 ENCOUNTER — HOSPITAL ENCOUNTER (OUTPATIENT)
Dept: VASCULAR MEDICINE | Facility: HOSPITAL | Age: 83
Discharge: HOME | End: 2023-12-20
Payer: MEDICARE

## 2023-12-20 VITALS
HEART RATE: 89 BPM | SYSTOLIC BLOOD PRESSURE: 123 MMHG | DIASTOLIC BLOOD PRESSURE: 78 MMHG | RESPIRATION RATE: 18 BRPM | WEIGHT: 144 LBS | BODY MASS INDEX: 23.96 KG/M2

## 2023-12-20 DIAGNOSIS — G20.C PARKINSONISM, UNSPECIFIED PARKINSONISM TYPE (MULTI): ICD-10-CM

## 2023-12-20 DIAGNOSIS — I10 ORTHOSTATIC HYPERTENSION: ICD-10-CM

## 2023-12-20 DIAGNOSIS — R55 SYNCOPE, UNSPECIFIED SYNCOPE TYPE: ICD-10-CM

## 2023-12-20 DIAGNOSIS — Z95.0 PACEMAKER: Primary | ICD-10-CM

## 2023-12-20 DIAGNOSIS — I95.9 HYPOTENSION, UNSPECIFIED HYPOTENSION TYPE: Primary | ICD-10-CM

## 2023-12-20 DIAGNOSIS — R55 SYNCOPE AND COLLAPSE: Primary | ICD-10-CM

## 2023-12-20 DIAGNOSIS — I49.5 SICK SINUS SYNDROME DUE TO SA NODE DYSFUNCTION (MULTI): ICD-10-CM

## 2023-12-20 DIAGNOSIS — R41.89 COGNITIVE IMPAIRMENT: ICD-10-CM

## 2023-12-20 PROCEDURE — 93880 EXTRACRANIAL BILAT STUDY: CPT

## 2023-12-20 PROCEDURE — 1159F MED LIST DOCD IN RCRD: CPT | Performed by: PSYCHIATRY & NEUROLOGY

## 2023-12-20 PROCEDURE — 1036F TOBACCO NON-USER: CPT | Performed by: PSYCHIATRY & NEUROLOGY

## 2023-12-20 PROCEDURE — 3074F SYST BP LT 130 MM HG: CPT | Performed by: PSYCHIATRY & NEUROLOGY

## 2023-12-20 PROCEDURE — 3078F DIAST BP <80 MM HG: CPT | Performed by: PSYCHIATRY & NEUROLOGY

## 2023-12-20 PROCEDURE — 1160F RVW MEDS BY RX/DR IN RCRD: CPT | Performed by: PSYCHIATRY & NEUROLOGY

## 2023-12-20 PROCEDURE — 1126F AMNT PAIN NOTED NONE PRSNT: CPT | Performed by: PSYCHIATRY & NEUROLOGY

## 2023-12-20 PROCEDURE — 99204 OFFICE O/P NEW MOD 45 MIN: CPT | Performed by: PSYCHIATRY & NEUROLOGY

## 2023-12-20 PROCEDURE — 93880 EXTRACRANIAL BILAT STUDY: CPT | Performed by: SURGERY

## 2023-12-20 ASSESSMENT — UNIFIED PARKINSONS DISEASE RATING SCALE (UPDRS)
AMPLITUDE_LUE: 0
POSTURAL_TREMOR_RIGHTHAND: 0
PRONATION_SUPINATION_LEFT: 0
SPONTANEITY_OF_MOVEMENT: 1
FREEZING_GAIT: 0
TOETAPPING_RIGHT: 0
POSTURAL_STABILITY: 0
FINGER_TAPPING_LEFT: 2
FINGER_TAPPING_RIGHT: 2
TOETAPPING_LEFT: 0
KINETIC_TREMOR_LEFTHAND: 1
LEVODOPA: NO
CHAIR_RISING_SCALE: 0
FACIAL_EXPRESSION: 2
KINETIC_TREMOR_RIGHTHAND: 1
POSTURE: 1
RIGIDITY_LLE: 0
RIGIDITY_NECK: 0
RIGIDITY_LUE: 0
SPEECH: 1
LEG_AGILITY_LEFT: 0
RIGIDITY_RLE: 0
AMPLITUDE_LLE: 0
POSTURAL_TREMOR_LEFTHAND: 0
CONSTANCY_TREMOR_ATREST: 0
AMPLITUDE_RUE: 0
LEG_AGILITY_RIGHT: 0
TOTAL_SCORE: 14
AMPLITUDE_LIP_JAW: 0
PARKINSONS_MEDS: NO
HANDMOVEMENTS_RIGHT: 1
AMPLITUDE_RLE: 0
PRONATION_SUPINATION_RIGHT: 0
GAIT: 1
RIGIDITY_RUE: 0

## 2023-12-20 ASSESSMENT — ENCOUNTER SYMPTOMS
DEPRESSION: 0
LOSS OF SENSATION IN FEET: 0
OCCASIONAL FEELINGS OF UNSTEADINESS: 0

## 2023-12-20 NOTE — PROGRESS NOTES
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    History is obtained through interview with patient, her 2 sons and her  who are with her today.  2 years ago she had two unwitnessed falls in the span of a few weeks.  Both falls were unwitnessed as she was walking unaccompanied when she had the falls and she does not remember the episodes.     Around this time her family started to notice personality and memory changes that came on gradually.  Her personality became more reserved, with less emotion.  Family also noticed that her short-term memory was worse.  Previously she was very outgoing, opinionated and talkative but over the last few years they have noticed that she is not talking as much. Family has noticed that she has a masked face, often has staring facial expression, they havent noticed changed in speech.    Once or twice she has gotten lost while driving but is always able to get to the correct location.  She is currently not driving due to her recurrent syncopal episodes.  No bizarre behaviors, excessive spending, impulse control issues.    She is functioning well at home and does all ADLs.  For activities she reads often and does puzzles.    Her falls started in June 2021.  Since that time she has had multiple recurrent syncopal or presyncopal episodes.  She denies prodromal symptoms and regains consciousness quickly after the syncopal episodes.  One time she was doing chair yoga and had syncopal episode when she went to stand.    Two Sundays ago she was in [employer] and had a presyncopal episode when she went to stand.  She did not eat that morning and just prior to the syncopal episode felt warm.  Her family was able to have her sit down before she  fell.    Family has not noticed any changes in gait.  She denies loss of sense of smell, dysphagia, change in voice in handwriting, slowness of movement, ataxia, constipation or changes in urinary habits, hallucinations.    She has completed Zio patch monitoring and seeing cardiology as well.  She is currently seeing neurology, Dr. Leanna Miranda for syncope and cognitive impairment. She was last seen by neurology in December 2023 with concern for neurodegenerative cause of her symptoms given memory loss, personality change, parkinsonism and orthostasis.  She denies symptoms of depression or anxiety.    At her neurology appointment she had orthostatic vital signs obtained which were positive, results are copied below:  Lying /70, P68  Sitting /70, P 80  Standing /68, P92  She did not have symptoms with position change.    She is currently seeing nephrology for CKD 3, they are continuing her losartan however recommended compression stockings and increased water intake which she has been doing.    She wore a ambulatory heart monitor for syncopal workup that detected atrial flutter and she has been started on flecainide by cardiology.  She recently had an internal loop recorder placed as well.    MRI brain 9/2022  Parenchyma: There is no diffusion restriction abnormality to suggest  acute infarct.  Few small scattered foci of increased signal in the  white matter regions cerebral hemispheres to microangiopathic white  matter changes. There is no mass effect or midline shift.     Paranasal Sinuses and Mastoids: Visualized paranasal sinuses and  mastoid air cells are unremarkable.  IMPRESSION:  No evidence of acute infarct, intracranial mass effect or midline  shift.      MOTOR SYMPTOMS:  Tremor at rest - no tremor   Slowness of movement - no  Stiffness/rigidity - no   Postural instability (unsteadiness, falls) - yes, multiple falls     NON-MOTOR SYMPTOMS:  Anosmia: no  Constipation: no, daily BM, no  urinary issues   Dream enactment: no snoring, no RBD symptoms   Visual benign hallucinations: no  Postural hypotension:   RLS: no   Bladder dysfunction: no  Abnormal sweating flushing; no  Cognitive impairment: yes    Social history   - worked as teacher, stopped in 1965  - etoh - glass on wine a nigh t  - no smoking   - does not do finances, stopped 3-4 years  - not driving now     Family History  - mother: colon cancer   - father: none  - no PD, no dementia, no neurological         Review of Systems:  14 point review of systems conducted and negative other than noted in HPI.    Past Medical History  Past Medical History:   Diagnosis Date    Encounter for gynecological examination (general) (routine) without abnormal findings     Encounter for routine pelvic examination    Other conditions influencing health status     Screening for malignant neoplasms, colon     Surgical History  Past Surgical History:   Procedure Laterality Date    CARDIAC ELECTROPHYSIOLOGY PROCEDURE Left 11/30/2023    Procedure: Loop Insertion;  Surgeon: Abel Chong MD;  Location: [facility];  Service: Electrophysiology;  Laterality: Left;    OTHER SURGICAL HISTORY  05/31/2013    Surgery Excision Of Parotid Tumor/Gland    OTHER SURGICAL HISTORY  05/31/2013    Spinal Diskectomy Lumbar    TONSILLECTOMY  05/31/2013    Tonsillectomy     Medications    Current Outpatient Medications   Medication Instructions    cholecalciferol (VITAMIN D-3) 50 mcg, oral, Daily    cyanocobalamin, vitamin B-12, (Vitamin B-12) 1,000 mcg tablet extended release 1 tablet, oral, Daily, as directed    flecainide (TAMBOCOR) 50 mg, oral, 2 times daily    losartan (COZAAR) 50 mg, oral, Daily    rivaroxaban (XARELTO) 20 mg, oral, Daily, Take with food.      (Not in a hospital admission)           Social History  Social History     Tobacco Use    Smoking status: Never    Smokeless tobacco: Never   Substance Use Topics    Alcohol use: Yes     Alcohol/week: 7.0  standard drinks of alcohol     Types: 7 Glasses of wine per week    Drug use: Never     Allergies  Sulfa (sulfonamide antibiotics)    No MRI head results found for the past 12 months  CT head wo IV contrast    Result Date: 7/31/2023  Interpreted By:  ESTEBAN ALEJANDRE MD MRN: [id number] Patient Name: TEJAS WALTERS  STUDY: CT HEAD WO CONTRAST;  7/31/2023 3:15 pm  INDICATION: syncope .  COMPARISON: None.  ACCESSION NUMBER(S): [id number]  ORDERING CLINICIAN: SOHA JEROME  TECHNIQUE: Noncontrast axial CT images of head were obtained with coronal and sagittal reconstructed images.  FINDINGS: BRAIN PARENCHYMA: No evidence of acute intraparenchymal hemorrhage or acute large territory ischemic infarct. Gray-white differentiation is preserved. No mass-effect, midline shift or effacement of cerebral sulci. No significant white matter disease.  VENTRICLES and EXTRA-AXIAL SPACES: No acute extra-axial or intraventricular hemorrhage. Ventricles and sulci are age-concordant.  ORBITS: The visualized orbits and globes are within normal limits.  PARANASAL SINUSES/MASTOIDS: No hemorrhage or air-fluid levels within the visualized paranasal sinuses. The mastoids are well aerated.  CALVARIUM: No skull fracture.  EXTRACRANIAL SOFT TISSUES: No discernible abnormality.       1. No acute intracranial abnormality or calvarial fracture.           Last Recorded Vitals    Visit Vitals  /78 (BP Location: Right arm, Patient Position: Standing, BP Cuff Size: Adult)   Pulse 89   Resp 18   Wt 65.3 kg (144 lb)   BMI 23.96 kg/m²   OB Status Postmenopausal   Smoking Status Never   BSA 1.73 m²        NEUROLOGIC EXAM:    MENTAL STATUS:  - Alert, oriented to situation.  Incorrectly states year as 2022 and month is November but then self corrects.  - Recall of recent events intact. Fund of knowledge intact.  - Speech is fluent. Repetition intact. Follows complex commands.     CRANIAL NERVES:  - CN I: Not Assessed  - CN II: Pupils constrict to light  bilaterally 3->2 mm, visual fields intact bilaterally  - CN III, IV, VI: Extraocular movements intact without nystagmus  - CN V: Facial sensation intact to light touch  - CN VII: No facial droop, closes eyes against resistance.  Marked hypomimia with decreased eye blink and lack of spontaneous smiling  - CN VIII: Hearing intact to voice  - CN IX, X: Palate elevates symmetrically  - CN XII: Tongue midline without atrophy or fasciculations    MOTOR:    - Strength: R                L  Shoulder Abd 5                5  Elbow Flex 5                5  Elbow Ext  5                5  Hip flexion 5                5  Knee Ext  5                5  Knee Flex        5                 5  DorsiFlex 5                5  PlantarFlex       5                5    REFLEXES:  R                L  Biceps            +2               +2  Brachioradialis+2               +2  Patellar            +2               +2  Achilles +2               +2  Plantar           Down            Down  Ankle Clonus   Absent          Absent    COORDINATION: Finger to nose and heel to shin intact bilaterally  SENSATION: Intact and symmetric to light touch in all extremities  GAIT: Able to stand from seated with arms across chest.  Posture was slightly stooped with decreased arm swing.  No shuffling, stride length was mildly decreased.  No en bloc turning or freezing.    Pull test was negative    Movement disorder focused exam  No rest or postural tremor  Mild action tremor bilaterally on finger-nose testing  Normal tone in upper and lower extremities  Finger tapping mildly bradykinetic with decrement  Normal toe and foot tapping     MDS UPDRS 1st Score: Motor Examination  Is the patient on medication for treating the symptoms of Parkinson's Disease?: No  Is the patient on Levodopa?: No  Speech: 1  Facial Expression: 2  Rigidty Neck: 0  Rigidty RUE: 0  Rigidity - LUE: 0  Rigidity RLE: 0  Rigidity LLE: 0  Finger Tapping Right Hand: 2  Finger Tapping Left Hand: 2  Hand  Movements- Right Hand: 1  Hand Movements- Left Hand: 1  Pronatiaon-Supination Movments - Right Hand: 0  Pronatiaon-Supination Movments Left Hand: 0  Toe Tapping Right Foot: 0  Toe Tapping - Left Foot: 0  Leg Agility - Right Le  Leg Agility - Left le  Arising from Chair: 0  Gait: 1  Freezing of Gait: 0  Postural Stability: 0  Posture: 1  Global Spontanteity of Movment ( Body Bradykinesia): 1  Postural Tremor - Right Hand: 0  Postural Tremor - Left hand: 0  Kinetic Tremor - Right hand: 1  Kinetic Tremor - Left hand: 1  Rest Tremor Amplitude - RUE: 0  Rest Tremor Amplitude - LUE: 0  Rest Tremor Amplitude - RLE: 0  Rest Tremor Amplitude - LLE: 0  Rest Tremor Amplitude - Lip/Jaw: 0  Constancy of Rest Tremor: 0  MDS UPDRS Total Score: 14      Relevant Results  No results found for this or any previous visit (from the past 24 hour(s)).           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  She has 2-year history of mild cognitive decline, personality changes with apathy and decreased emotional response, recurrent syncope and orthostasis.  On neurologic exam she has marked hypomimia, mild upper extremity bradykinesia and mild parkinsonian gait.  Unifying diagnosis could be early prodromal Parkinson's disease, however at this point she does not meet diagnostic criteria for PD.  Regarding her orthostatic hypotension, this could be related to an underlying synucleopathy however cardiac causes and other autonomic neuropathies are also possible.        Recommendations  -Agree with tilt table testing for further evaluation of autonomic dysfunction  -Referral to neuropsychology for cognitive testing  -Referral to autonomic neurology for further evaluation of autonomic dysfunction  -Continue to follow with general neurology   -Return  in 4 to 5 months after testing is completed to discuss results      Tyrell Rooney MD PhD   Neurology Resident     Pt seen and discussed with Dr Chatman

## 2023-12-20 NOTE — LETTER
December 21, 2023     Leanna Miranda MD  [address] [place] [address]  [place] [address]    Patient: Lorie Ding   YOB: 1940   Date of Visit: 12/20/2023       Dear Dr. Leanna Miranda MD:    Thank you for referring Lorie Ding to me for evaluation. Below are my notes for this consultation.  If you have questions, please do not hesitate to call me. I look forward to following your patient along with you.       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    History is obtained through interview with patient, her 2 sons and her  who are with her today.  2 years ago she had two unwitnessed falls in the span of a few weeks.  Both falls were unwitnessed as she was walking unaccompanied when she had the falls and she does not remember the episodes.     Around this time her family started to notice personality and memory changes that came on gradually.  Her personality became more reserved, with less emotion.  Family also noticed that her short-term memory was worse.  Previously she was very outgoing, opinionated and talkative but over the last few years they have noticed that she is not talking as much. Family has noticed that she has a masked face, often has staring facial expression, they havent noticed changed in speech.    Once or twice she has gotten lost while driving but is always able to get to the correct location.  She is currently not driving due to her recurrent syncopal episodes.  No bizarre behaviors, excessive spending, impulse control issues.    She is functioning well at home and does all ADLs.   For activities she reads often and does puzzles.    Her falls started in June 2021.  Since that time she has had multiple recurrent syncopal or presyncopal episodes.  She denies prodromal symptoms and regains consciousness quickly after the syncopal episodes.  One time she was doing chair yoga and had syncopal episode when she went to stand.    Two Sundays ago she was in Religious and had a presyncopal episode when she went to stand.  She did not eat that morning and just prior to the syncopal episode felt warm.  Her family was able to have her sit down before she fell.    Family has not noticed any changes in gait.  She denies loss of sense of smell, dysphagia, change in voice in handwriting, slowness of movement, ataxia, constipation or changes in urinary habits, hallucinations.    She has completed Zio patch monitoring and seeing cardiology as well.  She is currently seeing neurology, Dr. Leanna Miranda for syncope and cognitive impairment. She was last seen by neurology in December 2023 with concern for neurodegenerative cause of her symptoms given memory loss, personality change, parkinsonism and orthostasis.  She denies symptoms of depression or anxiety.    At her neurology appointment she had orthostatic vital signs obtained which were positive, results are copied below:  Lying /70, P68  Sitting /70, P 80  Standing /68, P92  She did not have symptoms with position change.    She is currently seeing nephrology for CKD 3, they are continuing her losartan however recommended compression stockings and increased water intake which she has been doing.    She wore a ambulatory heart monitor for syncopal workup that detected atrial flutter and she has been started on flecainide by cardiology.  She recently had an internal loop recorder placed as well.    MRI brain 9/2022  Parenchyma: There is no diffusion restriction abnormality to suggest  acute infarct.  Few small scattered foci of increased signal in  the  white matter regions cerebral hemispheres to microangiopathic white  matter changes. There is no mass effect or midline shift.     Paranasal Sinuses and Mastoids: Visualized paranasal sinuses and  mastoid air cells are unremarkable.  IMPRESSION:  No evidence of acute infarct, intracranial mass effect or midline  shift.      MOTOR SYMPTOMS:  Tremor at rest - no tremor   Slowness of movement - no  Stiffness/rigidity - no   Postural instability (unsteadiness, falls) - yes, multiple falls     NON-MOTOR SYMPTOMS:  Anosmia: no  Constipation: no, daily BM, no urinary issues   Dream enactment: no snoring, no RBD symptoms   Visual benign hallucinations: no  Postural hypotension:   RLS: no   Bladder dysfunction: no  Abnormal sweating flushing; no  Cognitive impairment: yes    Social history   - worked as teacher, stopped in 1965  - etoh - glass on wine a nigh t  - no smoking   - does not do finances, stopped 3-4 years  - not driving now     Family History  - mother: colon cancer   - father: none  - no PD, no dementia, no neurological         Review of Systems:  14 point review of systems conducted and negative other than noted in HPI.    Past Medical History  Past Medical History:   Diagnosis Date   • Encounter for gynecological examination (general) (routine) without abnormal findings     Encounter for routine pelvic examination   • Other conditions influencing health status     Screening for malignant neoplasms, colon     Surgical History  Past Surgical History:   Procedure Laterality Date   • CARDIAC ELECTROPHYSIOLOGY PROCEDURE Left 11/30/2023    Procedure: Loop Insertion;  Surgeon: Abel Chong MD;  Location: [facility] Card[facility];  Service: Electrophysiology;  Laterality: Left;   • OTHER SURGICAL HISTORY  05/31/2013    Surgery Excision Of Parotid Tumor/Gland   • OTHER SURGICAL HISTORY  05/31/2013    Spinal Diskectomy Lumbar   • TONSILLECTOMY  05/31/2013    Tonsillectomy     Medications    Current Outpatient  Medications   Medication Instructions   • cholecalciferol (VITAMIN D-3) 50 mcg, oral, Daily   • cyanocobalamin, vitamin B-12, (Vitamin B-12) 1,000 mcg tablet extended release 1 tablet, oral, Daily, as directed   • flecainide (TAMBOCOR) 50 mg, oral, 2 times daily   • losartan (COZAAR) 50 mg, oral, Daily   • rivaroxaban (XARELTO) 20 mg, oral, Daily, Take with food.      (Not in a hospital admission)           Social History  Social History     Tobacco Use   • Smoking status: Never   • Smokeless tobacco: Never   Substance Use Topics   • Alcohol use: Yes     Alcohol/week: 7.0 standard drinks of alcohol     Types: 7 Glasses of wine per week   • Drug use: Never     Allergies  Sulfa (sulfonamide antibiotics)    No MRI head results found for the past 12 months  CT head wo IV contrast    Result Date: 7/31/2023  Interpreted By:  ESTEBAN ALEJANDRE MD MRN: [id number] Patient Name: TEJAS WALTERS  STUDY: CT HEAD WO CONTRAST;  7/31/2023 3:15 pm  INDICATION: syncope .  COMPARISON: None.  ACCESSION NUMBER(S): [id number]  ORDERING CLINICIAN: SOHA JEROME  TECHNIQUE: Noncontrast axial CT images of head were obtained with coronal and sagittal reconstructed images.  FINDINGS: BRAIN PARENCHYMA: No evidence of acute intraparenchymal hemorrhage or acute large territory ischemic infarct. Gray-white differentiation is preserved. No mass-effect, midline shift or effacement of cerebral sulci. No significant white matter disease.  VENTRICLES and EXTRA-AXIAL SPACES: No acute extra-axial or intraventricular hemorrhage. Ventricles and sulci are age-concordant.  ORBITS: The visualized orbits and globes are within normal limits.  PARANASAL SINUSES/MASTOIDS: No hemorrhage or air-fluid levels within the visualized paranasal sinuses. The mastoids are well aerated.  CALVARIUM: No skull fracture.  EXTRACRANIAL SOFT TISSUES: No discernible abnormality.       1. No acute intracranial abnormality or calvarial fracture.           Last Recorded Vitals    Visit  Vitals  /78 (BP Location: Right arm, Patient Position: Standing, BP Cuff Size: Adult)   Pulse 89   Resp 18   Wt 65.3 kg (144 lb)   BMI 23.96 kg/m²   OB Status Postmenopausal   Smoking Status Never   BSA 1.73 m²        NEUROLOGIC EXAM:    MENTAL STATUS:  - Alert, oriented to situation.  Incorrectly states year as 2022 and month is November but then self corrects.  - Recall of recent events intact. Fund of knowledge intact.  - Speech is fluent. Repetition intact. Follows complex commands.     CRANIAL NERVES:  - CN I: Not Assessed  - CN II: Pupils constrict to light bilaterally 3->2 mm, visual fields intact bilaterally  - CN III, IV, VI: Extraocular movements intact without nystagmus  - CN V: Facial sensation intact to light touch  - CN VII: No facial droop, closes eyes against resistance.  Marked hypomimia with decreased eye blink and lack of spontaneous smiling  - CN VIII: Hearing intact to voice  - CN IX, X: Palate elevates symmetrically  - CN XII: Tongue midline without atrophy or fasciculations    MOTOR:    - Strength: R                L  Shoulder Abd 5                5  Elbow Flex 5                5  Elbow Ext  5                5  Hip flexion 5                5  Knee Ext  5                5  Knee Flex        5                 5  DorsiFlex 5                5  PlantarFlex       5                5    REFLEXES:  R                L  Biceps            +2               +2  Brachioradialis+2               +2  Patellar            +2               +2  Achilles +2               +2  Plantar           Down            Down  Ankle Clonus   Absent          Absent    COORDINATION: Finger to nose and heel to shin intact bilaterally  SENSATION: Intact and symmetric to light touch in all extremities  GAIT: Able to stand from seated with arms across chest.  Posture was slightly stooped with decreased arm swing.  No shuffling, stride length was mildly decreased.  No en bloc turning or freezing.    Pull test was  negative    Movement disorder focused exam  No rest or postural tremor  Mild action tremor bilaterally on finger-nose testing  Normal tone in upper and lower extremities  Finger tapping mildly bradykinetic with decrement  Normal toe and foot tapping     MDS UPDRS 1st Score: Motor Examination  Is the patient on medication for treating the symptoms of Parkinson's Disease?: No  Is the patient on Levodopa?: No  Speech: 1  Facial Expression: 2  Rigidty Neck: 0  Rigidty RUE: 0  Rigidity - LUE: 0  Rigidity RLE: 0  Rigidity LLE: 0  Finger Tapping Right Hand: 2  Finger Tapping Left Hand: 2  Hand Movements- Right Hand: 1  Hand Movements- Left Hand: 1  Pronatiaon-Supination Movments - Right Hand: 0  Pronatiaon-Supination Movments Left Hand: 0  Toe Tapping Right Foot: 0  Toe Tapping - Left Foot: 0  Leg Agility - Right Le  Leg Agility - Left le  Arising from Chair: 0  Gait: 1  Freezing of Gait: 0  Postural Stability: 0  Posture: 1  Global Spontanteity of Movment ( Body Bradykinesia): 1  Postural Tremor - Right Hand: 0  Postural Tremor - Left hand: 0  Kinetic Tremor - Right hand: 1  Kinetic Tremor - Left hand: 1  Rest Tremor Amplitude - RUE: 0  Rest Tremor Amplitude - LUE: 0  Rest Tremor Amplitude - RLE: 0  Rest Tremor Amplitude - LLE: 0  Rest Tremor Amplitude - Lip/Jaw: 0  Constancy of Rest Tremor: 0  MDS UPDRS Total Score: 14      Relevant Results  No results found for this or any previous visit (from the past 24 hour(s)).           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  She has 2-year history of mild cognitive decline, personality changes with apathy and decreased emotional response, recurrent syncope and orthostasis.  On neurologic exam she has marked hypomimia, mild upper extremity bradykinesia and mild parkinsonian gait.   Unifying diagnosis could be early prodromal Parkinson's disease, however at this point she does not meet diagnostic criteria for PD.  Regarding her orthostatic hypotension, this could be related to an underlying synucleopathy however cardiac causes and other autonomic neuropathies are also possible.        Recommendations  -Agree with tilt table testing for further evaluation of autonomic dysfunction  -Referral to neuropsychology for cognitive testing  -Referral to autonomic neurology for further evaluation of autonomic dysfunction  -Continue to follow with general neurology   -Return in 4 to 5 months after testing is completed to discuss results      Tyrell Rooney MD PhD   Neurology Resident     Pt seen and discussed with Dr Chatman     Attestation signed by Tray Chatman MD at 12/21/2023 12:13 PM:  I saw and evaluated the patient. I personally obtained the key and critical portions of the history and physical exam or was physically present for key and critical portions performed by the resident/fellow. I reviewed the resident/fellow's documentation and discussed the patient with the resident/fellow. I agree with the resident/fellow's medical decision making as documented in the note.'    Complex case.  Recurrent syncope with unrevealing cardiac workup with orthostatic hypotension.  Tilt table is pending, and should be helpful.  We will request autonomic consult, since management is tricky with CRF and need for a antihypertensive, not to mention cardiac issues.  The OH plus minor parkinsonism plus STM loss could be a sign of a prodromal synucleinopathy such as PD, but she does not meet diagnostic criteria at this time.  Vascular parkinsonism is on thedifferential.  Offered DATscan, but it would not affect management right now.  Encouraged neuropsych testing that may be diagnostic.  In terms of follow up, we would be glad to see her, but a general or autonomic neurologist for longitudinal treatment of OH and  then re-referral to our center PRN in case of increasing parkinsonism would be reasonable as well.    Tray Chatman MD  [facility]  [facility]  , Neurology  [facility] [employer]     CODING and DOCUMENTATION DETERMINATION  For the Evaluation and Management of this patient, the level of Medical Decision Making for this visit was determined based on the following:  The level of COMPLEXITY AND NUMBER OF PROBLEMS ADDRESSED was [HIGH, MODERATE, LOW] as determined by:   MODERATE:  two or more stable chronic illnesses.  one chronic illnesses with exacerbation, progression or side effect of Rx.  The level of RISK OF COMPLICATIONS was MODERATE as determined by: MODERATE: prescription drug management.  Thus, the level of medical decision making (based on the lower of the two highest elements) was determined to be MODERATE

## 2023-12-20 NOTE — PATIENT INSTRUCTIONS
Thank you for coming to see us today. We do see some mild changes in your walking and your coordination but it is not obvious that you have Parkinson disease right now. We would like you to get cognitive testing to check your memory. The tilt table test will be helpful in understanding you are passing out.     - return in 4 months for follow up after you have completed memory testing and the tilt table testing  - continue to check your blood pressure at home   - continue with the tilt table testing   - we will refer you to see a autonomic specialist for evaluation for your orthostatic hypotension   - we will have you get a memory testing

## 2023-12-21 ENCOUNTER — APPOINTMENT (OUTPATIENT)
Dept: NEUROLOGY | Facility: CLINIC | Age: 83
End: 2023-12-21
Payer: MEDICARE

## 2023-12-21 ENCOUNTER — OFFICE VISIT (OUTPATIENT)
Dept: WOUND CARE | Facility: HOSPITAL | Age: 83
End: 2023-12-21
Payer: MEDICARE

## 2023-12-21 PROCEDURE — 1126F AMNT PAIN NOTED NONE PRSNT: CPT | Performed by: SURGERY

## 2023-12-21 PROCEDURE — 1160F RVW MEDS BY RX/DR IN RCRD: CPT | Performed by: SURGERY

## 2023-12-21 PROCEDURE — 1159F MED LIST DOCD IN RCRD: CPT | Performed by: SURGERY

## 2023-12-21 PROCEDURE — 1036F TOBACCO NON-USER: CPT | Performed by: SURGERY

## 2023-12-21 PROCEDURE — 11042 DBRDMT SUBQ TIS 1ST 20SQCM/<: CPT

## 2023-12-21 PROCEDURE — 11042 DBRDMT SUBQ TIS 1ST 20SQCM/<: CPT | Performed by: SURGERY

## 2023-12-26 ENCOUNTER — HOSPITAL ENCOUNTER (OUTPATIENT)
Dept: CARDIOLOGY | Facility: HOSPITAL | Age: 83
Discharge: HOME | End: 2023-12-26
Payer: MEDICARE

## 2023-12-26 DIAGNOSIS — Z45.09 ENCOUNTER FOR LOOP RECORDER CHECK: ICD-10-CM

## 2023-12-26 DIAGNOSIS — R55 SYNCOPE AND COLLAPSE: ICD-10-CM

## 2023-12-27 ENCOUNTER — CLINICAL SUPPORT (OUTPATIENT)
Dept: WOUND CARE | Facility: HOSPITAL | Age: 83
End: 2023-12-27
Payer: MEDICARE

## 2023-12-27 PROCEDURE — 11042 DBRDMT SUBQ TIS 1ST 20SQCM/<: CPT

## 2023-12-29 ENCOUNTER — APPOINTMENT (OUTPATIENT)
Dept: VASCULAR MEDICINE | Facility: HOSPITAL | Age: 83
End: 2023-12-29
Payer: MEDICARE

## 2024-01-02 ENCOUNTER — HOSPITAL ENCOUNTER (OUTPATIENT)
Dept: CARDIOLOGY | Facility: CLINIC | Age: 84
Discharge: HOME | End: 2024-01-02
Payer: MEDICARE

## 2024-01-02 DIAGNOSIS — R55 SYNCOPE AND COLLAPSE: ICD-10-CM

## 2024-01-03 ENCOUNTER — CLINICAL SUPPORT (OUTPATIENT)
Dept: WOUND CARE | Facility: HOSPITAL | Age: 84
End: 2024-01-03
Payer: MEDICARE

## 2024-01-03 PROCEDURE — 11042 DBRDMT SUBQ TIS 1ST 20SQCM/<: CPT

## 2024-01-10 ENCOUNTER — HOSPITAL ENCOUNTER (OUTPATIENT)
Dept: NEUROLOGY | Facility: CLINIC | Age: 84
Discharge: HOME | End: 2024-01-10
Payer: MEDICARE

## 2024-01-10 ENCOUNTER — TELEPHONE (OUTPATIENT)
Dept: NEUROLOGY | Facility: CLINIC | Age: 84
End: 2024-01-10

## 2024-01-10 DIAGNOSIS — G20.C PARKINSONISM, UNSPECIFIED PARKINSONISM TYPE (MULTI): ICD-10-CM

## 2024-01-10 DIAGNOSIS — R55 SYNCOPE, UNSPECIFIED SYNCOPE TYPE: ICD-10-CM

## 2024-01-10 PROCEDURE — 95923 AUTONOMIC NRV SYST FUNJ TEST: CPT | Performed by: PSYCHIATRY & NEUROLOGY

## 2024-01-10 PROCEDURE — 95924 ANS PARASYMP & SYMP W/TILT: CPT | Performed by: PSYCHIATRY & NEUROLOGY

## 2024-01-11 ENCOUNTER — CLINICAL SUPPORT (OUTPATIENT)
Dept: WOUND CARE | Facility: HOSPITAL | Age: 84
End: 2024-01-11
Payer: MEDICARE

## 2024-01-11 PROCEDURE — 11042 DBRDMT SUBQ TIS 1ST 20SQCM/<: CPT

## 2024-01-17 ENCOUNTER — APPOINTMENT (OUTPATIENT)
Dept: NEUROLOGY | Facility: CLINIC | Age: 84
End: 2024-01-17
Payer: MEDICARE

## 2024-01-18 ENCOUNTER — CLINICAL SUPPORT (OUTPATIENT)
Dept: WOUND CARE | Facility: HOSPITAL | Age: 84
End: 2024-01-18
Payer: MEDICARE

## 2024-01-18 PROCEDURE — 11042 DBRDMT SUBQ TIS 1ST 20SQCM/<: CPT

## 2024-01-26 ENCOUNTER — HOSPITAL ENCOUNTER (OUTPATIENT)
Dept: CARDIOLOGY | Facility: CLINIC | Age: 84
Discharge: HOME | End: 2024-01-26
Payer: MEDICARE

## 2024-01-26 DIAGNOSIS — R55 SYNCOPE AND COLLAPSE: ICD-10-CM

## 2024-01-29 ENCOUNTER — HOSPITAL ENCOUNTER (OUTPATIENT)
Dept: CARDIOLOGY | Facility: CLINIC | Age: 84
Discharge: HOME | End: 2024-01-29
Payer: MEDICARE

## 2024-01-29 DIAGNOSIS — R55 SYNCOPE AND COLLAPSE: ICD-10-CM

## 2024-02-02 DIAGNOSIS — I47.10 PAROXYSMAL SUPRAVENTRICULAR TACHYCARDIA (CMS-HCC): ICD-10-CM

## 2024-02-02 DIAGNOSIS — I48.92 ATRIAL FLUTTER, UNSPECIFIED TYPE (MULTI): ICD-10-CM

## 2024-02-02 RX ORDER — FLECAINIDE ACETATE 50 MG/1
50 TABLET ORAL 2 TIMES DAILY
Qty: 180 TABLET | Refills: 1 | Status: SHIPPED | OUTPATIENT
Start: 2024-02-02 | End: 2024-05-22 | Stop reason: SDUPTHER

## 2024-02-05 ENCOUNTER — HOSPITAL ENCOUNTER (OUTPATIENT)
Dept: CARDIOLOGY | Facility: CLINIC | Age: 84
Discharge: HOME | End: 2024-02-05
Payer: MEDICARE

## 2024-02-05 DIAGNOSIS — R55 SYNCOPE AND COLLAPSE: ICD-10-CM

## 2024-02-08 ENCOUNTER — HOSPITAL ENCOUNTER (OUTPATIENT)
Dept: CARDIOLOGY | Facility: CLINIC | Age: 84
Discharge: HOME | End: 2024-02-08
Payer: MEDICARE

## 2024-02-08 DIAGNOSIS — R55 SYNCOPE AND COLLAPSE: ICD-10-CM

## 2024-02-12 ENCOUNTER — HOSPITAL ENCOUNTER (OUTPATIENT)
Dept: CARDIOLOGY | Facility: CLINIC | Age: 84
Discharge: HOME | End: 2024-02-12
Payer: MEDICARE

## 2024-02-12 DIAGNOSIS — I49.5 SICK SINUS SYNDROME DUE TO SA NODE DYSFUNCTION (MULTI): ICD-10-CM

## 2024-02-12 DIAGNOSIS — Z45.09 ENCOUNTER FOR LOOP RECORDER CHECK: ICD-10-CM

## 2024-02-12 DIAGNOSIS — Z95.0 PACEMAKER: ICD-10-CM

## 2024-02-12 DIAGNOSIS — R55 SYNCOPE AND COLLAPSE: ICD-10-CM

## 2024-02-15 ENCOUNTER — APPOINTMENT (OUTPATIENT)
Dept: PSYCHOLOGY | Facility: CLINIC | Age: 84
End: 2024-02-15
Payer: MEDICARE

## 2024-02-19 ENCOUNTER — HOSPITAL ENCOUNTER (OUTPATIENT)
Dept: CARDIOLOGY | Facility: CLINIC | Age: 84
Discharge: HOME | End: 2024-02-19
Payer: MEDICARE

## 2024-02-19 DIAGNOSIS — R55 SYNCOPE AND COLLAPSE: ICD-10-CM

## 2024-02-19 DIAGNOSIS — Z45.09 ENCOUNTER FOR LOOP RECORDER CHECK: ICD-10-CM

## 2024-02-20 ENCOUNTER — HOSPITAL ENCOUNTER (OUTPATIENT)
Dept: CARDIOLOGY | Facility: CLINIC | Age: 84
Discharge: HOME | End: 2024-02-20
Payer: MEDICARE

## 2024-02-20 DIAGNOSIS — R55 SYNCOPE AND COLLAPSE: ICD-10-CM

## 2024-02-20 DIAGNOSIS — Z45.09 ENCOUNTER FOR LOOP RECORDER CHECK: ICD-10-CM

## 2024-02-23 ENCOUNTER — HOSPITAL ENCOUNTER (OUTPATIENT)
Dept: CARDIOLOGY | Facility: CLINIC | Age: 84
Discharge: HOME | End: 2024-02-23
Payer: MEDICARE

## 2024-02-23 DIAGNOSIS — R55 SYNCOPE AND COLLAPSE: ICD-10-CM

## 2024-02-23 DIAGNOSIS — Z45.09 ENCOUNTER FOR LOOP RECORDER CHECK: ICD-10-CM

## 2024-02-26 ENCOUNTER — HOSPITAL ENCOUNTER (OUTPATIENT)
Dept: CARDIOLOGY | Facility: CLINIC | Age: 84
Discharge: HOME | End: 2024-02-26
Payer: MEDICARE

## 2024-02-26 DIAGNOSIS — Z45.09 ENCOUNTER FOR LOOP RECORDER CHECK: ICD-10-CM

## 2024-02-26 DIAGNOSIS — R55 SYNCOPE AND COLLAPSE: ICD-10-CM

## 2024-03-04 ENCOUNTER — HOSPITAL ENCOUNTER (OUTPATIENT)
Dept: CARDIOLOGY | Facility: CLINIC | Age: 84
Discharge: HOME | End: 2024-03-04
Payer: MEDICARE

## 2024-03-04 DIAGNOSIS — R55 SYNCOPE AND COLLAPSE: ICD-10-CM

## 2024-03-04 DIAGNOSIS — Z45.09 ENCOUNTER FOR LOOP RECORDER CHECK: ICD-10-CM

## 2024-03-11 ENCOUNTER — HOSPITAL ENCOUNTER (OUTPATIENT)
Dept: CARDIOLOGY | Facility: CLINIC | Age: 84
Discharge: HOME | End: 2024-03-11
Payer: MEDICARE

## 2024-03-11 DIAGNOSIS — R55 SYNCOPE AND COLLAPSE: ICD-10-CM

## 2024-03-11 DIAGNOSIS — Z45.09 ENCOUNTER FOR LOOP RECORDER CHECK: ICD-10-CM

## 2024-03-22 ENCOUNTER — HOSPITAL ENCOUNTER (OUTPATIENT)
Dept: CARDIOLOGY | Facility: CLINIC | Age: 84
Discharge: HOME | End: 2024-03-22
Payer: MEDICARE

## 2024-03-22 DIAGNOSIS — R55 SYNCOPE AND COLLAPSE: ICD-10-CM

## 2024-03-22 DIAGNOSIS — Z45.09 ENCOUNTER FOR LOOP RECORDER CHECK: ICD-10-CM

## 2024-04-01 ENCOUNTER — HOSPITAL ENCOUNTER (OUTPATIENT)
Dept: CARDIOLOGY | Facility: CLINIC | Age: 84
Discharge: HOME | End: 2024-04-01
Payer: MEDICARE

## 2024-04-01 DIAGNOSIS — Z45.09 ENCOUNTER FOR LOOP RECORDER CHECK: ICD-10-CM

## 2024-04-01 DIAGNOSIS — R55 SYNCOPE AND COLLAPSE: ICD-10-CM

## 2024-04-01 PROCEDURE — 93298 REM INTERROG DEV EVAL SCRMS: CPT

## 2024-04-08 ENCOUNTER — HOSPITAL ENCOUNTER (OUTPATIENT)
Dept: CARDIOLOGY | Facility: CLINIC | Age: 84
Discharge: HOME | End: 2024-04-08
Payer: MEDICARE

## 2024-04-08 DIAGNOSIS — R55 SYNCOPE AND COLLAPSE: ICD-10-CM

## 2024-04-08 DIAGNOSIS — Z45.09 ENCOUNTER FOR LOOP RECORDER CHECK: ICD-10-CM

## 2024-04-10 ENCOUNTER — HOSPITAL ENCOUNTER (OUTPATIENT)
Dept: CARDIOLOGY | Facility: CLINIC | Age: 84
Discharge: HOME | End: 2024-04-10
Payer: MEDICARE

## 2024-04-10 DIAGNOSIS — R55 SYNCOPE AND COLLAPSE: ICD-10-CM

## 2024-04-10 DIAGNOSIS — Z45.09 ENCOUNTER FOR LOOP RECORDER CHECK: ICD-10-CM

## 2024-04-11 ENCOUNTER — HOSPITAL ENCOUNTER (OUTPATIENT)
Dept: CARDIOLOGY | Facility: CLINIC | Age: 84
Discharge: HOME | End: 2024-04-11
Payer: MEDICARE

## 2024-04-11 DIAGNOSIS — Z45.09 ENCOUNTER FOR LOOP RECORDER CHECK: ICD-10-CM

## 2024-04-11 DIAGNOSIS — R55 SYNCOPE AND COLLAPSE: ICD-10-CM

## 2024-04-15 ENCOUNTER — HOSPITAL ENCOUNTER (OUTPATIENT)
Dept: CARDIOLOGY | Facility: CLINIC | Age: 84
Discharge: HOME | End: 2024-04-15
Payer: MEDICARE

## 2024-04-15 DIAGNOSIS — Z45.09 ENCOUNTER FOR LOOP RECORDER CHECK: ICD-10-CM

## 2024-04-15 DIAGNOSIS — R55 SYNCOPE AND COLLAPSE: ICD-10-CM

## 2024-04-18 ENCOUNTER — HOSPITAL ENCOUNTER (OUTPATIENT)
Dept: CARDIOLOGY | Facility: CLINIC | Age: 84
Discharge: HOME | End: 2024-04-18
Payer: MEDICARE

## 2024-04-18 DIAGNOSIS — Z45.09 ENCOUNTER FOR LOOP RECORDER CHECK: ICD-10-CM

## 2024-04-18 DIAGNOSIS — R55 SYNCOPE AND COLLAPSE: ICD-10-CM

## 2024-04-25 ENCOUNTER — PATIENT MESSAGE (OUTPATIENT)
Dept: NEUROLOGY | Facility: CLINIC | Age: 84
End: 2024-04-25
Payer: MEDICARE

## 2024-05-06 ENCOUNTER — HOSPITAL ENCOUNTER (OUTPATIENT)
Dept: CARDIOLOGY | Facility: CLINIC | Age: 84
Discharge: HOME | End: 2024-05-06
Payer: MEDICARE

## 2024-05-06 DIAGNOSIS — R55 SYNCOPE AND COLLAPSE: ICD-10-CM

## 2024-05-06 DIAGNOSIS — Z45.09 ENCOUNTER FOR LOOP RECORDER CHECK: ICD-10-CM

## 2024-05-06 PROCEDURE — 93298 REM INTERROG DEV EVAL SCRMS: CPT

## 2024-05-08 DIAGNOSIS — G20.C PARKINSONISM, UNSPECIFIED PARKINSONISM TYPE (MULTI): ICD-10-CM

## 2024-05-08 DIAGNOSIS — I10 ORTHOSTATIC HYPERTENSION: Primary | ICD-10-CM

## 2024-05-08 NOTE — PROGRESS NOTES
Chief complaint:        History of Present Illness:       There were no vitals taken for this visit.     Physical examination:      Neurologic Exam       No diagnosis found.       No orders of the defined types were placed in this encounter.         Impression and Plan:      Leanna Miranda MD

## 2024-05-09 ENCOUNTER — OFFICE VISIT (OUTPATIENT)
Dept: NEUROLOGY | Facility: CLINIC | Age: 84
End: 2024-05-09
Payer: MEDICARE

## 2024-05-09 VITALS
HEIGHT: 65 IN | WEIGHT: 145 LBS | DIASTOLIC BLOOD PRESSURE: 69 MMHG | BODY MASS INDEX: 24.16 KG/M2 | SYSTOLIC BLOOD PRESSURE: 126 MMHG | HEART RATE: 81 BPM

## 2024-05-09 DIAGNOSIS — I95.1 ORTHOSTATIC HYPOTENSION: ICD-10-CM

## 2024-05-09 DIAGNOSIS — R55 SYNCOPE, UNSPECIFIED SYNCOPE TYPE: Primary | ICD-10-CM

## 2024-05-09 DIAGNOSIS — G62.9 SMALL FIBER NEUROPATHY: ICD-10-CM

## 2024-05-09 DIAGNOSIS — R26.9 GAIT DISORDER: ICD-10-CM

## 2024-05-09 DIAGNOSIS — G20.C PARKINSONISM, UNSPECIFIED PARKINSONISM TYPE (MULTI): ICD-10-CM

## 2024-05-09 PROCEDURE — 99215 OFFICE O/P EST HI 40 MIN: CPT | Performed by: SPECIALIST

## 2024-05-09 PROCEDURE — 3078F DIAST BP <80 MM HG: CPT | Performed by: SPECIALIST

## 2024-05-09 PROCEDURE — 1159F MED LIST DOCD IN RCRD: CPT | Performed by: SPECIALIST

## 2024-05-09 PROCEDURE — 3074F SYST BP LT 130 MM HG: CPT | Performed by: SPECIALIST

## 2024-05-09 PROCEDURE — 1126F AMNT PAIN NOTED NONE PRSNT: CPT | Performed by: SPECIALIST

## 2024-05-09 ASSESSMENT — PAIN SCALES - GENERAL: PAINLEVEL: 0-NO PAIN

## 2024-05-09 ASSESSMENT — LIFESTYLE VARIABLES
SKIP TO QUESTIONS 9-10: 1
AUDIT-C TOTAL SCORE: 1
HOW OFTEN DO YOU HAVE SIX OR MORE DRINKS ON ONE OCCASION: NEVER
HOW OFTEN DO YOU HAVE A DRINK CONTAINING ALCOHOL: MONTHLY OR LESS
HOW MANY STANDARD DRINKS CONTAINING ALCOHOL DO YOU HAVE ON A TYPICAL DAY: 1 OR 2

## 2024-05-09 ASSESSMENT — ENCOUNTER SYMPTOMS
LOSS OF SENSATION IN FEET: 0
OCCASIONAL FEELINGS OF UNSTEADINESS: 1
DEPRESSION: 0

## 2024-05-09 ASSESSMENT — ANXIETY QUESTIONNAIRES
GAD7 TOTAL SCORE: 0
1. FEELING NERVOUS, ANXIOUS, OR ON EDGE: NOT AT ALL
4. TROUBLE RELAXING: NOT AT ALL
2. NOT BEING ABLE TO STOP OR CONTROL WORRYING: NOT AT ALL
5. BEING SO RESTLESS THAT IT IS HARD TO SIT STILL: NOT AT ALL
7. FEELING AFRAID AS IF SOMETHING AWFUL MIGHT HAPPEN: NOT AT ALL
IF YOU CHECKED OFF ANY PROBLEMS ON THIS QUESTIONNAIRE, HOW DIFFICULT HAVE THESE PROBLEMS MADE IT FOR YOU TO DO YOUR WORK, TAKE CARE OF THINGS AT HOME, OR GET ALONG WITH OTHER PEOPLE: NOT DIFFICULT AT ALL
6. BECOMING EASILY ANNOYED OR IRRITABLE: NOT AT ALL
3. WORRYING TOO MUCH ABOUT DIFFERENT THINGS: NOT AT ALL

## 2024-05-09 ASSESSMENT — PATIENT HEALTH QUESTIONNAIRE - PHQ9
SUM OF ALL RESPONSES TO PHQ9 QUESTIONS 1 & 2: 0
2. FEELING DOWN, DEPRESSED OR HOPELESS: NOT AT ALL
1. LITTLE INTEREST OR PLEASURE IN DOING THINGS: NOT AT ALL

## 2024-05-09 NOTE — PROGRESS NOTES
Date of Service: 5/9/2024  Patient: Lorie Ding  MRN: [id number]  Referring Provider: No ref. provider found  Primary Care Physician: Shelley Márquez DO     History of Present Illness:    Ms. Ding is a 83 y.o. left handed female  with a past medical history of  hypertension, chronic kidney disease stage 3, DVT and pulmonary embolism (on Xarelto),who  has been suffering  for the last 2 years history of mild cognitive decline, personality changes, apathy and decreased emotional response, recurrent syncope and orthostasis. She had form fainting episodes that have been increasing in frequency, with about 10 to 12 episodes since the beginning of 2023. These episodes are often triggered by standing up or during physical exertion, leading to advice against walking alone due to fainting risks.    Additionally, Lorie describes significant personality changes following two falls in 2021,  that resulted in head injuries. She notes a diminished emotional response and a decreased interest in social interactions.    Her family has expressed concern over these changes, observing that Lorie has become less engaged in conversations and has withdrawn from social activities, now primarily interacting with family members.      Medications:    Current Outpatient Medications:     cholecalciferol (Vitamin D-3) 50 mcg (2,000 unit) capsule, Take 1 capsule (50 mcg) by mouth once daily., Disp: , Rfl:     cyanocobalamin, vitamin B-12, (Vitamin B-12) 1,000 mcg tablet extended release, Take 1 tablet (1,000 mcg) by mouth once daily. as directed, Disp: , Rfl:     flecainide (Tambocor) 50 mg tablet, Take 1 tablet (50 mg) by mouth 2 times a day., Disp: 180 tablet, Rfl: 1    losartan (Cozaar) 50 mg tablet, Take 1 tablet (50 mg) by mouth once daily., Disp: 90 tablet, Rfl: 1    rivaroxaban (Xarelto) 20 mg tablet, Take 1 tablet (20 mg) by mouth once daily. Take with food., Disp: 90 tablet, Rfl: 1       Review of Systems:  The systems were reviewed  with pertinent positives and negatives documented in the HPI.       Social history:  She is , has a supportive , 3 sons and one daughter.  She denies smoking, ETOH and substance use, resides in both [place] and [place], presents with a history of health concerns. She has been engaged in hobbies such as puzzles and reading and participates in physical activities like golf, and Yoga albeit to a lesser extent due to her fainting spells.     She was evaluate by the following specialists:    General Neurology.  Dr. Miranda, who performed an extensive testing, including autonomic nervous system tests, which identified a moderate autonomic neuropathy.     Neurology, Dr Chatman,   Movement disorder specialist   She has 2-year history of mild cognitive decline, personality changes with apathy and decreased emotional response, recurrent syncope and orthostasis.  On neurologic exam she has marked hypomimia, mild upper extremity bradykinesia and mild parkinsonian gait.  Unifying diagnosis could be early prodromal Parkinson's disease, however at this point she does not meet diagnostic criteria for PD.  Regarding her orthostatic hypotension, this could be related to an underlying synucleopathy however cardiac causes and other autonomic neuropathies are also possible.       Nephrology; Dr. Shweta Garcia, who had the following impression:  #CKD stage III initially was worsening currently stable, and has been monitoring her hypertension on Losartan 50 mg and osteoporosis on Vitamin D is low-continue D3 ( was on Alendronate 70 mg weekly-currently on hold)     Cardiology Dr. Abel Chong, who reported:  Patient had one episode of syncope in July during yoga lasting 2-3 seconds, no warning signs. She had a near syncopal episode about 3 weeks ago during yoga. Event monitor revealed episodes of atrial flutter (longest lasting ~ 7 minutes) and atrial tach with rates up to 182 bpm. She is currently on Xarelto  (hx of PE). CT calcium score of 84 (RCA). Will initiate Flecainide 50mg bid.     She had a cardiac loop recorder placement .     Diagnostic Test Results:  - Autonomic nervous system testing (January 10, 2024) which reported:  This is an abnormal cardiovascular autonomic test panel due to the presence of a low VR, abnormal BP responses to the VM, mild orthostatic hypotension and moderate supine hypertension without cardiac sympathetic failure. While there is no evidence of a significant cardiovagal abnormality, there are findings suggestive of a mild peripheral vasomotor adrenergic abnormality.   Moreover, the QSART findings are consistent with a postganglionic sympathetic sudomotor abnormality like that seen in a non-length dependent autonomic/small fiber neuropathy, as sparing of the foot is not typical of a distal axonopathy.     - Carotid Doppler: Heterogeneous calcified plaques in right and left internal carotid arteries, with no narrowing greater than 50%..    - Echocardiogram (2021): Performed which reported   1. The left ventricular systolic function is normal with a 60-65% estimated ejection fraction.   2. Spectral Doppler shows an impaired relaxation pattern of left ventricular diastolic filling.   3. The right ventricle is mildly enlarged. There is normal right ventricular global systolic function.   4. There is mild tricuspid regurgitation.   5. Moderately elevated pulmonary artery pressure, estimated RVSP 50mmHg.    Last Cardiology Tests:  ECG (10/25/23):  Sinus rhythm, HR 70 bpm.        Echo (2021):     CONCLUSIONS:   1. The left ventricular systolic function is normal with a 60-65% estimated ejection fraction.   2. Spectral Doppler shows an impaired relaxation pattern of left ventricular diastolic filling.   3. The right ventricle is mildly enlarged. There is normal right ventricular global systolic function.   4. There is mild tricuspid regurgitation.   5. Moderately elevated pulmonary artery  pressure, estimated RVSP 50mmHg.     Event monitor (11 days - 09/2023):  Patient had a min HR of 52 bpm, max HR of 182 bpm, and avg HR of 83 bpm. Predominant underlying rhythm was Sinus Rhythm. 115 Supraventricular Tachycardia runs occurred, the run with the fastest interval lasting 10 beats with a max rate of 182 bpm, the longest lasting 15 beats with an avg rate of 118 bpm. Some episodes of Supraventricular Tachycardia may be possible Atrial Tachycardia with variable block. Atrial Flutter occurred (<1% burden), ranging from  bpm (avg of 133 bpm), the longest lasting 6 mins 57 secs with an avg rate of 143 bpm. Isolated SVEs were rare (<1.0%), SVE Couplets were rare (<1.0%), and SVE Triplets were rare (<1.0%). Isolated VEs were rare (<1.0%), VE Couplets were rare (<1.0%), and no VE Triplets were present. Ventricular Bigeminy was present.     CT cardiac scoring (10/25/2023):  IMPRESSION:  1. Coronary artery calcium score of 84.8*.      *Coronary Artery  Agatston score      Score  risk  Very low 1-99  Mildly increased 100-299  Moderately increased >300  Moderate to severely increased >800      - CT Scan of the brain (July 2023): Showed no significant atrophy, appearing normal.    - Implanted loop monitor: Present, indicating monitoring of heart rhythm.       Patient Active Problem List   Diagnosis    Abnormal mammogram    Acute pulmonary embolism (Multi)    Pulmonary embolus (Multi)    Behavioral change    Benign essential hypertension    Cogwheel rigidity    Colon polyps    Elevated serum creatinine    Exposure to COVID-19 virus    Extrapyramidal movement disorder    Hyperlipidemia    Idiopathic osteoporosis    Lung nodule    Mild cognitive impairment    Presence of IVC filter    Stage 3b chronic kidney disease (Multi)    Thrombocytopenia (CMS-HCC)    Vitamin B12 deficiency    Vitamin D deficiency    Syncope             Results:         Lab Results   Component Value Date    XCGXQTDR56 1287 (H) 05/10/2023        IRON STUDIES:   Lab Results   Component Value Date    IRON 73 12/11/2023    TIBC 346 12/11/2023    FERRITIN 162 (H) 12/11/2023   His lab workup revealed    Lab Results   Component Value Date    SPEP NORMAL 05/16/2022     CBC:   Lab Results   Component Value Date    WBC 7.6 12/11/2023    HGB 14.0 12/11/2023    HCT 40.4 12/11/2023     12/11/2023     BMP:   Lab Results   Component Value Date     12/11/2023    K 4.5 12/11/2023     12/11/2023    CO2 26 12/11/2023    BUN 17 12/11/2023    CREATININE 1.37 (H) 12/11/2023    CALCIUM 9.6 12/11/2023    MG 1.83 12/17/2022    PHOS 2.7 12/11/2023     LFT:   Lab Results   Component Value Date    ALKPHOS 57 07/31/2023    BILITOT 0.6 07/31/2023    PROT 6.6 07/31/2023    ALBUMIN 4.0 12/11/2023    ALT 9 07/31/2023    AST 16 07/31/2023             Problems Assessed/Relevant:  Problem List Items Addressed This Visit    None    Past Medical History:   Diagnosis Date    Encounter for gynecological examination (general) (routine) without abnormal findings     Encounter for routine pelvic examination    Other conditions influencing health status     Screening for malignant neoplasms, colon     Past Surgical History:   Procedure Laterality Date    CARDIAC ELECTROPHYSIOLOGY PROCEDURE Left 11/30/2023    Procedure: Loop Insertion;  Surgeon: Abel Chong MD;  Location: [facility];  Service: Electrophysiology;  Laterality: Left;    OTHER SURGICAL HISTORY  05/31/2013    Surgery Excision Of Parotid Tumor/Gland    OTHER SURGICAL HISTORY  05/31/2013    Spinal Diskectomy Lumbar    TONSILLECTOMY  05/31/2013    Tonsillectomy     Family History   Problem Relation Name Age of Onset    Colon cancer Mother      Other (Old Age) Father       Social History     Tobacco Use    Smoking status: Never    Smokeless tobacco: Never   Substance Use Topics    Alcohol use: Yes     Alcohol/week: 7.0 standard drinks of alcohol     Types: 7 Glasses of wine per week      Allergies  "  Allergen Reactions    Sulfa (Sulfonamide Antibiotics) Hives and Rash              Physical Exam:     /69 (BP Location: Left arm, Patient Position: Sitting, BP Cuff Size: Large adult)   Pulse 81   Ht 1.651 m (5' 5\")   Wt 65.8 kg (145 lb)   BMI 24.13 kg/m²       She is not in any acute distress.  Musculoskeletal: No scoliosis, lordosis, kyphosis, pes cavus, or hammertoes.       She has masked face, left ectropion( droopy lower eyelid) and limited expression of emotion in the face due to a decrease in the speed and coordination with which the facial musculature is activated ( suggestive of hypomimia), a blunted affect and a good eye contact.      Mild action tremor bilaterally on finger-nose testing   Normal bilateral fingers,  toe and foot tapping     Neurological Exam:   Mental status reveals: alert and oriented to person, place.  She is oriented for month but month date or year.  She was able to recognize the names of her grandchildren with some difficulty.      Cranial nerves:  CN 2   Visual fields full to confrontation.   CN 3, 4, 6   EOMs normal alignment, full range.   No nystagmus.   CN 5   Facial sensation intact bilaterally.   CN 7   Normal and symmetric facial strength. Nasolabial folds symmetric.   CN 8   Hearing intact to conversation.   CN 9   Palate elevates symmetrically.   CN 11   Normal strength of shoulder shrug and neck turning.   CN 12   Tongue midline, with normal bulk and strength; no fasciculations.      Motor:  Normal muscle strength and bulk are normal. There are no scapular winging, fasciculations, tremor or other abnormal movement.  Increased left arm and leg cogwheel rigidity, with mild bradykinesia    Reflexes: symmetrical        Plantars: toes downgoing to plantar stimulation. No clonus or other pathologic reflexes present.      Sensory:   Pinprick sensation and touch sensation are normal and symmetrical.  Position senses are normal at the toes.  Vibration senses are normal at " the toes and ankles.  All sensory modalities were normal in the hands and upper extremities.       Coordination:  Finger-nose-finger is normal without dysmetria or overshoot and heel-to-shin is normal. Rapid alternating movements in hands and feet are normal.     Gait:  Anterior stooping of the upper part of the body, mild retropulsive and rotatory imbalance, decreased swinging of the left arm while walking..         Impression/Plan:   Paroxysmal spells of fainting for the last few years described as transient few seconds with associated orthostatic hypotension with physical exam consistent with masked face, hypomimia, decreased swinging of the left arm while walking, and bradykinesia.    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      Antihypertensive medications and dehydration may play a role in her dizziness as well.       The patient has been doing relatively well in the last few months as she had no syncopal episodes since March 2024    I suggest:  -To monitor supine and orthostatic hypotension and heart rate  -To consider adjusting her hypertensive medications afterward.   -Transthoracic echocardiogram.  -Compression stocking and abdominal binder ( to be placed in the morning while is supine position).   -May drink 2 cups of ice water in case of symptomatic orthostatic hypotension  -Physical therapy evaluation for gait safety by bradykinesia.    -To encourage hydration as far  tolerated from medical perspective  -Small fiber neuropathy lab workup as ordered  -To consider medical treatment in the future if needed which may include either midodrine, fludrocortisone, or droxidopa if her symptoms worsen ( Such decision to be made in coordination with the her cardiologist and nephrologist).   -To follow up in my office in 2 months or sooner as needed.       Case was discussed in details with , and sons.       Reviewed and approved by ABIGAIL CHAPIN on 5/9/24 at 2:20 PM.    I personally spent [60] minutes on the day of the visit completing the review of the medical record and outside records, obtaining history and performing an appropriate physical exam, patient care, counseling and education, placing orders, independently reviewing results, communicating with the patient/family and other providers, coordinating care and performing appropriate clinical documentation.    Abigail Chapin M.D.

## 2024-05-13 ENCOUNTER — LAB (OUTPATIENT)
Dept: LAB | Facility: LAB | Age: 84
End: 2024-05-13
Payer: MEDICARE

## 2024-05-13 DIAGNOSIS — G62.9 SMALL FIBER NEUROPATHY: ICD-10-CM

## 2024-05-13 LAB
ANION GAP SERPL CALC-SCNC: 13 MMOL/L (ref 10–20)
BASOPHILS # BLD AUTO: 0.06 X10*3/UL (ref 0–0.1)
BASOPHILS NFR BLD AUTO: 1 %
BUN SERPL-MCNC: 32 MG/DL (ref 6–23)
CALCIUM SERPL-MCNC: 9.9 MG/DL (ref 8.6–10.6)
CENTROMERE B AB SER-ACNC: <0.2 AI
CHLORIDE SERPL-SCNC: 105 MMOL/L (ref 98–107)
CHROMATIN AB SERPL-ACNC: <0.2 AI
CO2 SERPL-SCNC: 26 MMOL/L (ref 21–32)
CREAT SERPL-MCNC: 1.57 MG/DL (ref 0.5–1.05)
DSDNA AB SER-ACNC: <1 IU/ML
EGFRCR SERPLBLD CKD-EPI 2021: 33 ML/MIN/1.73M*2
ENA JO1 AB SER QL IA: <0.2 AI
ENA RNP AB SER IA-ACNC: <0.2 AI
ENA SCL70 AB SER QL IA: <0.2 AI
ENA SM AB SER IA-ACNC: <0.2 AI
ENA SM+RNP AB SER QL IA: <0.2 AI
ENA SS-A AB SER IA-ACNC: <0.2 AI
ENA SS-B AB SER IA-ACNC: <0.2 AI
EOSINOPHIL # BLD AUTO: 0.09 X10*3/UL (ref 0–0.4)
EOSINOPHIL NFR BLD AUTO: 1.4 %
ERYTHROCYTE [DISTWIDTH] IN BLOOD BY AUTOMATED COUNT: 13.2 % (ref 11.5–14.5)
ERYTHROCYTE [SEDIMENTATION RATE] IN BLOOD BY WESTERGREN METHOD: 7 MM/H (ref 0–30)
GLUCOSE SERPL-MCNC: 105 MG/DL (ref 74–99)
HCT VFR BLD AUTO: 41 % (ref 36–46)
HGB BLD-MCNC: 14.3 G/DL (ref 12–16)
IMM GRANULOCYTES # BLD AUTO: 0.04 X10*3/UL (ref 0–0.5)
IMM GRANULOCYTES NFR BLD AUTO: 0.6 % (ref 0–0.9)
LYMPHOCYTES # BLD AUTO: 1.8 X10*3/UL (ref 0.8–3)
LYMPHOCYTES NFR BLD AUTO: 28.6 %
MCH RBC QN AUTO: 36.4 PG (ref 26–34)
MCHC RBC AUTO-ENTMCNC: 34.9 G/DL (ref 32–36)
MCV RBC AUTO: 104 FL (ref 80–100)
MONOCYTES # BLD AUTO: 0.71 X10*3/UL (ref 0.05–0.8)
MONOCYTES NFR BLD AUTO: 11.3 %
NEUTROPHILS # BLD AUTO: 3.59 X10*3/UL (ref 1.6–5.5)
NEUTROPHILS NFR BLD AUTO: 57.1 %
NRBC BLD-RTO: 0 /100 WBCS (ref 0–0)
PLATELET # BLD AUTO: 244 X10*3/UL (ref 150–450)
POTASSIUM SERPL-SCNC: 4.5 MMOL/L (ref 3.5–5.3)
PROT SERPL-MCNC: 6.6 G/DL (ref 6.4–8.2)
RBC # BLD AUTO: 3.93 X10*6/UL (ref 4–5.2)
RHEUMATOID FACT SER NEPH-ACNC: <10 IU/ML (ref 0–15)
RIBOSOMAL P AB SER-ACNC: <0.2 AI
SODIUM SERPL-SCNC: 139 MMOL/L (ref 136–145)
T3 SERPL-MCNC: 80 NG/DL (ref 60–200)
THYROPEROXIDASE AB SERPL-ACNC: 52 IU/ML
WBC # BLD AUTO: 6.3 X10*3/UL (ref 4.4–11.3)

## 2024-05-13 PROCEDURE — 85652 RBC SED RATE AUTOMATED: CPT

## 2024-05-13 PROCEDURE — 82164 ANGIOTENSIN I ENZYME TEST: CPT

## 2024-05-13 PROCEDURE — 86235 NUCLEAR ANTIGEN ANTIBODY: CPT

## 2024-05-13 PROCEDURE — 85025 COMPLETE CBC W/AUTO DIFF WBC: CPT

## 2024-05-13 PROCEDURE — 86376 MICROSOMAL ANTIBODY EACH: CPT

## 2024-05-13 PROCEDURE — 84165 PROTEIN E-PHORESIS SERUM: CPT

## 2024-05-13 PROCEDURE — 82384 ASSAY THREE CATECHOLAMINES: CPT

## 2024-05-13 PROCEDURE — 36415 COLL VENOUS BLD VENIPUNCTURE: CPT

## 2024-05-13 PROCEDURE — 86225 DNA ANTIBODY NATIVE: CPT

## 2024-05-13 PROCEDURE — 86431 RHEUMATOID FACTOR QUANT: CPT

## 2024-05-13 PROCEDURE — 84432 ASSAY OF THYROGLOBULIN: CPT

## 2024-05-13 PROCEDURE — 83835 ASSAY OF METANEPHRINES: CPT

## 2024-05-13 PROCEDURE — 80048 BASIC METABOLIC PNL TOTAL CA: CPT

## 2024-05-13 PROCEDURE — 84207 ASSAY OF VITAMIN B-6: CPT

## 2024-05-13 PROCEDURE — 84155 ASSAY OF PROTEIN SERUM: CPT

## 2024-05-13 PROCEDURE — 84446 ASSAY OF VITAMIN E: CPT

## 2024-05-13 PROCEDURE — 86255 FLUORESCENT ANTIBODY SCREEN: CPT

## 2024-05-13 PROCEDURE — 86038 ANTINUCLEAR ANTIBODIES: CPT

## 2024-05-13 PROCEDURE — 86800 THYROGLOBULIN ANTIBODY: CPT

## 2024-05-13 PROCEDURE — 84165 PROTEIN E-PHORESIS SERUM: CPT | Performed by: SPECIALIST

## 2024-05-13 PROCEDURE — 84480 ASSAY TRIIODOTHYRONINE (T3): CPT

## 2024-05-13 PROCEDURE — 83519 RIA NONANTIBODY: CPT

## 2024-05-13 PROCEDURE — 83921 ORGANIC ACID SINGLE QUANT: CPT

## 2024-05-15 LAB
ACE SERPL-CCNC: 56 U/L (ref 16–85)
ALBUMIN: 3.9 G/DL (ref 3.4–5)
ALPHA 1 GLOBULIN: 0.3 G/DL (ref 0.2–0.6)
ALPHA 2 GLOBULIN: 0.6 G/DL (ref 0.4–1.1)
BETA GLOBULIN: 0.9 G/DL (ref 0.5–1.2)
GAMMA GLOBULIN: 0.9 G/DL (ref 0.5–1.4)
PATH REVIEW-SERUM PROTEIN ELECTROPHORESIS: NORMAL
PCA IGG SER-ACNC: 1.9 UNITS (ref 0–24.9)
PROTEIN ELECTROPHORESIS COMMENT: NORMAL

## 2024-05-16 ENCOUNTER — APPOINTMENT (OUTPATIENT)
Dept: NEUROLOGY | Facility: CLINIC | Age: 84
End: 2024-05-16
Payer: MEDICARE

## 2024-05-16 LAB
A-TOCOPHEROL VIT E SERPL-MCNC: 10.6 MG/L (ref 5.5–18)
ANA SER QL HEP2 SUBST: NEGATIVE
BETA+GAMMA TOCOPHEROL SERPL-MCNC: 1.1 MG/L (ref 0–6)
BILL ONLY-THYROGLOBULIN: NORMAL
THYROGLOB AB SERPL-ACNC: <0.9 IU/ML (ref 0–4)
THYROGLOB SERPL-MCNC: 26.4 NG/ML (ref 1.3–31.8)
THYROGLOB SERPL-MCNC: NORMAL NG/ML (ref 1.3–31.8)

## 2024-05-17 ENCOUNTER — LAB (OUTPATIENT)
Dept: LAB | Facility: LAB | Age: 84
End: 2024-05-17
Payer: MEDICARE

## 2024-05-17 DIAGNOSIS — G62.9 SMALL FIBER NEUROPATHY: ICD-10-CM

## 2024-05-17 LAB
ANNOTATION COMMENT IMP: NORMAL
METANEPHS SERPL-SCNC: 0.13 NMOL/L (ref 0–0.49)
METHYLMALONATE SERPL-SCNC: 0.12 UMOL/L (ref 0–0.4)
NORMETANEPH FREE SERPL-SCNC: 0.71 NMOL/L (ref 0–0.89)
PYRIDOXAL PHOS SERPL-SCNC: 54.7 NMOL/L (ref 20–125)

## 2024-05-17 PROCEDURE — 83835 ASSAY OF METANEPHRINES: CPT

## 2024-05-20 ENCOUNTER — APPOINTMENT (OUTPATIENT)
Dept: CARDIOLOGY | Facility: HOSPITAL | Age: 84
End: 2024-05-20
Payer: MEDICARE

## 2024-05-20 LAB
DOPAMINE SERPL-MCNC: <30 PG/ML (ref 0–48)
EPINEPH PLAS-MCNC: <15 PG/ML (ref 0–62)
NOREPINEPH PLAS-MCNC: 644 PG/ML (ref 0–874)

## 2024-05-21 ENCOUNTER — LAB (OUTPATIENT)
Dept: LAB | Facility: LAB | Age: 84
End: 2024-05-21
Payer: MEDICARE

## 2024-05-21 DIAGNOSIS — G62.9 SMALL FIBER NEUROPATHY: ICD-10-CM

## 2024-05-21 LAB
AMPHIPHYSIN IGG SER QL IA: NEGATIVE
ANNOTATION COMMENT IMP: NORMAL
COLLECT DURATION TIME SPEC: 24 HR
CREAT 24H UR-MRATE: 714 MG/D (ref 400–1300)
CREAT UR-MCNC: 119 MG/DL
CV2 AB SERPL QL IF: NEGATIVE
GLIAL NUC TYPE 1 AB SER QL IF: NEGATIVE
HU1 AB SER QL: NEGATIVE
HU2 AB SER QL IF: NEGATIVE
HU3 AB SER QL: NEGATIVE
METANEPH 24H UR-MCNC: 65 UG/L
METANEPH 24H UR-MRATE: 39 UG/D (ref 36–229)
METANEPH+NORMETANEPH UR-IMP: NORMAL
METANEPH/CREAT 24H UR: 55 UG/G CRT (ref 0–300)
NORMETANEPHRINE 24H UR-MCNC: 323 UG/L
NORMETANEPHRINE 24H UR-MRATE: 194 UG/D (ref 95–650)
NORMETANEPHRINE/CREAT 24H UR: 271 UG/G CRT (ref 0–400)
PARANEOPLASTIC AB SER-IMP: NORMAL
PCA-1 AB SER QL IF: NEGATIVE
PCA-2 AB SER QL IF: NEGATIVE
PCA-TR AB SER QL IF: NEGATIVE
SPECIMEN VOL ?TM UR: 600 ML
VGCC-P/Q BIND IGG+IGM SER IA-SCNC: 0 NMOL/L
VGKC IGG+IGM SER IA-SCNC: 0 NMOL/L

## 2024-05-22 DIAGNOSIS — I48.92 ATRIAL FLUTTER, UNSPECIFIED TYPE (MULTI): ICD-10-CM

## 2024-05-22 DIAGNOSIS — I47.10 PAROXYSMAL SUPRAVENTRICULAR TACHYCARDIA (CMS-HCC): ICD-10-CM

## 2024-05-22 RX ORDER — FLECAINIDE ACETATE 50 MG/1
50 TABLET ORAL 2 TIMES DAILY
Qty: 180 TABLET | Refills: 1 | Status: SHIPPED | OUTPATIENT
Start: 2024-05-22 | End: 2024-11-18

## 2024-05-23 ENCOUNTER — APPOINTMENT (OUTPATIENT)
Dept: NEUROLOGY | Facility: CLINIC | Age: 84
End: 2024-05-23
Payer: MEDICARE

## 2024-05-23 ENCOUNTER — APPOINTMENT (OUTPATIENT)
Dept: CARDIOLOGY | Facility: HOSPITAL | Age: 84
End: 2024-05-23
Payer: MEDICARE

## 2024-05-24 ENCOUNTER — APPOINTMENT (OUTPATIENT)
Dept: NEUROLOGY | Facility: CLINIC | Age: 84
End: 2024-05-24
Payer: MEDICARE

## 2024-05-28 ENCOUNTER — HOSPITAL ENCOUNTER (OUTPATIENT)
Dept: CARDIOLOGY | Facility: CLINIC | Age: 84
Discharge: HOME | End: 2024-05-28
Payer: MEDICARE

## 2024-05-28 DIAGNOSIS — R55 SYNCOPE AND COLLAPSE: ICD-10-CM

## 2024-05-28 DIAGNOSIS — Z45.09 ENCOUNTER FOR LOOP RECORDER CHECK: ICD-10-CM

## 2024-05-30 ENCOUNTER — HOSPITAL ENCOUNTER (OUTPATIENT)
Dept: CARDIOLOGY | Facility: CLINIC | Age: 84
Discharge: HOME | End: 2024-05-30
Payer: MEDICARE

## 2024-05-30 DIAGNOSIS — R55 SYNCOPE AND COLLAPSE: ICD-10-CM

## 2024-05-30 DIAGNOSIS — Z45.09 ENCOUNTER FOR LOOP RECORDER CHECK: ICD-10-CM

## 2024-05-31 ENCOUNTER — OFFICE VISIT (OUTPATIENT)
Dept: NEUROLOGY | Facility: CLINIC | Age: 84
End: 2024-05-31
Payer: MEDICARE

## 2024-05-31 ENCOUNTER — APPOINTMENT (OUTPATIENT)
Dept: PSYCHOLOGY | Facility: CLINIC | Age: 84
End: 2024-05-31
Payer: MEDICARE

## 2024-05-31 DIAGNOSIS — G20.C PARKINSONISM, UNSPECIFIED PARKINSONISM TYPE (MULTI): ICD-10-CM

## 2024-05-31 DIAGNOSIS — R41.89 COGNITIVE IMPAIRMENT: ICD-10-CM

## 2024-05-31 DIAGNOSIS — I10 ORTHOSTATIC HYPERTENSION: Primary | ICD-10-CM

## 2024-05-31 PROCEDURE — 99215 OFFICE O/P EST HI 40 MIN: CPT | Performed by: PSYCHIATRY & NEUROLOGY

## 2024-05-31 NOTE — PATIENT INSTRUCTIONS
"Lorie has a slowly worsening condition with memory loss, features of Parkinson's disease (\"Parkinsonism\") and impaired blood pressure control, which is the most serious part because she can faint when she is upright if her blood pressure drops. It is good that she has not fainted in two months. It is important for her to have salty snacks and plenty of water to prevent dehydration, which can make her more likely to faint. The compression stockings prevent blood from pooling in her feet and keep her blood circulating.     We do want Lorie to keep as active as possible. Exercise, especially when it is warm can cause her blood vessels to dilate, which can cause her to faint. She is less likely to faint if she is sitting, so please ask the physical therapist to suggest exercises she can do while sitting. Using a recumbent bike and stretching resistance bands are possibilities. You can take shorter walks early in the morning and in the evening when it is cooler. Lorie should not walk by herself in case she falls. Please use a shower chair or seat, or take baths. Please ask your dermatologist what to do about your very dry skin.    I will retire at the end of September so please follow up with Dr. Alfred. Let me know if you have questions before then.        There are medications that can raise Lorie's blood pressure, but that may harm her kidneys. Stopping her blood pressure medication can also harm her kidneys. Her kidney doctor can give you her advice on medication changes.   "

## 2024-05-31 NOTE — PROGRESS NOTES
Chief complaint:    Orthostatic hypotension and trouble remembering names    History of Present Illness:   Lorie is here with her  Brett, who provides much of the history but is also having trouble with his hearing aids. Lorie said that she had a good winter in [place]. She noted that sometimes the weather here was warmer than in [place]. She played pickleball once and then fainted and never played again. Brett said that she sat down after a match and passed out. He went to pick her up and she was fine after that. They routinely walked for a mile every day. She fell a couple more times, both after walking in 80 degree weather. She has not passed out or fallen in the past two months. She has been more reliably wearing compression stockings.    They have been walking 0.4 miles since returning here. The second half is uphill and Lorie admits to him that she gets tired. Brett also notes that Lorie has trouble remembering names. Their social [employer] has become mainly family, and they do see their 4 children regularly. She no longer plays pickleball and gave up chair yoga, which required standing, after she fell. She no longer drives. She still does laundry but no longer cooks.       Physical examination:  She is a very pleasant, soft-spoken woman who has a masked facies but readily smiles.    Neurologic Exam     Mental Status   Her speech was clear, fluent and appropriate. She denied she had any symptoms and questioned her  when he brought up her symptoms.     Cranial Nerves     CN III, IV, VI   Extraocular motions are normal.     CN VII   Facial expression full, symmetric.     Motor Exam Slight rigidity of the upper limbs. Slight postural tremor of the limbs but no rest tremor noted. Finger tapping was very brisk bilaterally.      Gait, Coordination, and Reflexes She walked slowly with slightly stooped posture and no arm swing, taking small steps.          1. Orthostatic hypertension        2. Parkinsonism,  unspecified Parkinsonism type (Multi)        3. Cognitive impairment               No orders of the defined types were placed in this encounter.         Impression and Plan:  Lorie has developed memory problems with Parkinsonism and prominent orthostatic hypotension. She has had multiple episodes of syncope and has fallen multiple times. We discussed the importance of physical activity for brain and general health. Brett will continue to encourage her to walk and will always accompany her. I recommended that they walk shorter distances but twice a day when it is cooler. She is going to start PT and they can ask specifically about exercises she can do while sitting.     We reviewed Lorie's recent blood work, which showed kidney impairment and borderline dehydration. She will continue her salty snacks with plenty of water. She will follow up with her nephrologist.    She has already consulted Dr. Alfred, and Lorie will follow up with him since I will retire in September.     Leanna Miranda MD

## 2024-06-03 ENCOUNTER — HOSPITAL ENCOUNTER (OUTPATIENT)
Dept: CARDIOLOGY | Facility: CLINIC | Age: 84
Discharge: HOME | End: 2024-06-03
Payer: MEDICARE

## 2024-06-03 ENCOUNTER — OFFICE VISIT (OUTPATIENT)
Dept: CARDIOLOGY | Facility: HOSPITAL | Age: 84
End: 2024-06-03
Payer: MEDICARE

## 2024-06-03 VITALS
DIASTOLIC BLOOD PRESSURE: 75 MMHG | OXYGEN SATURATION: 98 % | HEART RATE: 79 BPM | WEIGHT: 150.57 LBS | SYSTOLIC BLOOD PRESSURE: 145 MMHG | BODY MASS INDEX: 25.06 KG/M2

## 2024-06-03 DIAGNOSIS — R55 SYNCOPE AND COLLAPSE: ICD-10-CM

## 2024-06-03 DIAGNOSIS — Z45.09 ENCOUNTER FOR LOOP RECORDER CHECK: ICD-10-CM

## 2024-06-03 DIAGNOSIS — Z95.828 PRESENCE OF IVC FILTER: ICD-10-CM

## 2024-06-03 LAB
ATRIAL RATE: 78 BPM
P AXIS: 72 DEGREES
P OFFSET: 185 MS
P ONSET: 125 MS
PR INTERVAL: 186 MS
Q ONSET: 218 MS
QRS COUNT: 13 BEATS
QRS DURATION: 90 MS
QT INTERVAL: 380 MS
QTC CALCULATION(BAZETT): 433 MS
QTC FREDERICIA: 414 MS
R AXIS: 74 DEGREES
T AXIS: 64 DEGREES
T OFFSET: 408 MS
VENTRICULAR RATE: 78 BPM

## 2024-06-03 PROCEDURE — 3077F SYST BP >= 140 MM HG: CPT | Performed by: STUDENT IN AN ORGANIZED HEALTH CARE EDUCATION/TRAINING PROGRAM

## 2024-06-03 PROCEDURE — 93005 ELECTROCARDIOGRAM TRACING: CPT | Performed by: STUDENT IN AN ORGANIZED HEALTH CARE EDUCATION/TRAINING PROGRAM

## 2024-06-03 PROCEDURE — 99214 OFFICE O/P EST MOD 30 MIN: CPT | Performed by: STUDENT IN AN ORGANIZED HEALTH CARE EDUCATION/TRAINING PROGRAM

## 2024-06-03 PROCEDURE — 1159F MED LIST DOCD IN RCRD: CPT | Performed by: STUDENT IN AN ORGANIZED HEALTH CARE EDUCATION/TRAINING PROGRAM

## 2024-06-03 PROCEDURE — 3078F DIAST BP <80 MM HG: CPT | Performed by: STUDENT IN AN ORGANIZED HEALTH CARE EDUCATION/TRAINING PROGRAM

## 2024-06-03 NOTE — PROGRESS NOTES
History Of Present Illness:    Lorie Ding is a 82 y.o. female  referred to EP by Dr. Márquez  due to syncope. She has a history of hypertension, chronic kidney disease stage III, pulmonary embolism and on DOAC; s/p IVC filter, vitamin D deficiency and mild cognitive impairment. This year, the patient had one episode of syncope in July during yoga lasting 2-3 seconds, no warning signs. She had a near syncopal episode about 3 weeks ago during yoga. She has had other episodes prior. Event monitor was placed and showed episodes of atrial flutter (longest lasting ~ 7 minutes) and atrial tach with rates up to 182 bpm. She is currently on Xarelto (hx of PE). She is here today for a follow up.     Past Medical History:  She has a past medical history of Encounter for gynecological examination (general) (routine) without abnormal findings and Other conditions influencing health status.     Past Surgical History:  She has a past surgical history that includes Other surgical history (05/31/2013); Other surgical history (05/31/2013); and Tonsillectomy (05/31/2013).      Social History:  She reports that she has never smoked. She has never used smokeless tobacco. She reports current alcohol use. She reports that she does not use drugs.     Family History:  Family History          Family History   Problem Relation Name Age of Onset    Colon cancer Mother        Other (Old Age) Father                Allergies:  Sulfa (sulfonamide antibiotics)     Outpatient Medications:       Current Outpatient Medications   Medication Instructions    cholecalciferol (VITAMIN D-3) 50 mcg, oral, Daily    cholecalciferol (VITAMIN D-3) 125 mcg, oral, Daily    cyanocobalamin, vitamin B-12, (Vitamin B-12) 1,000 mcg tablet extended release 1 tablet, oral, Daily, as directed    doxycycline (VIBRAMYCIN) 100 mg, oral, 2 times daily    losartan (COZAAR) 50 mg, oral, Daily    losartan (COZAAR) 100 mg, oral, Daily    Xarelto 20 mg tablet TAKE 1 TABLET BY  "MOUTH EVERY DAY         Last Recorded Vitals:  Vitals       Vitals:     10/25/23 1057   BP: 134/78   BP Location: Left arm   Patient Position: Sitting   Pulse: 88   SpO2: 95%   Weight: 65.3 kg (144 lb 1 oz)   Height: 1.651 m (5' 5\")            Review of Systems   Constitutional:  Negative for fever.   Respiratory:  Negative for shortness of breath.    Cardiovascular:  Negative for chest pain, palpitations and leg swelling.        As per history.   Neurological:  Positive for syncope.   Psychiatric/Behavioral:  Negative for confusion.       Physical Exam  Constitutional:       Appearance: Normal appearance.   Cardiovascular:      Rate and Rhythm: Normal rate and regular rhythm.      Heart sounds: No murmur heard.     No friction rub. No gallop.   Pulmonary:      Effort: Pulmonary effort is normal.      Breath sounds: Normal breath sounds.   Abdominal:      Palpations: Abdomen is soft.   Musculoskeletal:      Cervical back: Neck supple.   Neurological:      Mental Status: She is alert.   Psychiatric:         Mood and Affect: Mood normal.         Behavior: Behavior normal.               Last Labs:  CBC -        Lab Results   Component Value Date     WBC 6.7 07/31/2023     HGB 13.9 07/31/2023     HCT 43.3 07/31/2023      (H) 07/31/2023      07/31/2023         CMP -        Lab Results   Component Value Date     CALCIUM 9.3 07/31/2023     PHOS 2.7 12/17/2022     PROT 6.6 07/31/2023     ALBUMIN 3.8 07/31/2023     AST 16 07/31/2023     ALT 9 07/31/2023     ALKPHOS 57 07/31/2023     BILITOT 0.6 07/31/2023         LIPID PANEL -         Lab Results   Component Value Date     CHOL 235 (H) 05/10/2023     TRIG 125 05/10/2023     .6 05/10/2023     CHHDL 2.1 05/10/2023     LDLF 99 05/10/2023     VLDL 25 05/10/2023         RENAL FUNCTION PANEL -         Lab Results   Component Value Date     GLUCOSE 96 07/31/2023      07/31/2023     K 4.5 07/31/2023      07/31/2023     CO2 27 07/31/2023     ANIONGAP " "13 07/31/2023     BUN 25 (H) 07/31/2023     CREATININE 1.45 (H) 07/31/2023     CALCIUM 9.3 07/31/2023     PHOS 2.7 12/17/2022     ALBUMIN 3.8 07/31/2023         No results found for: \"BNP\", \"HGBA1C\"     Last Cardiology Tests:  ECG (10/25/23):  Sinus rhythm, HR 70 bpm.        Echo (2021):     CONCLUSIONS:   1. The left ventricular systolic function is normal with a 60-65% estimated ejection fraction.   2. Spectral Doppler shows an impaired relaxation pattern of left ventricular diastolic filling.   3. The right ventricle is mildly enlarged. There is normal right ventricular global systolic function.   4. There is mild tricuspid regurgitation.   5. Moderately elevated pulmonary artery pressure, estimated RVSP 50mmHg.     Event monitor (11 days - 09/2023):  Patient had a min HR of 52 bpm, max HR of 182 bpm, and avg HR of 83 bpm. Predominant underlying rhythm was Sinus Rhythm. 115 Supraventricular Tachycardia runs occurred, the run with the fastest interval lasting 10 beats with a max rate of 182 bpm, the longest lasting 15 beats with an avg rate of 118 bpm. Some episodes of Supraventricular Tachycardia may be possible Atrial Tachycardia with variable block. Atrial Flutter occurred (<1% burden), ranging from  bpm (avg of 133 bpm), the longest lasting 6 mins 57 secs with an avg rate of 143 bpm. Isolated SVEs were rare (<1.0%), SVE Couplets were rare (<1.0%), and SVE Triplets were rare (<1.0%). Isolated VEs were rare (<1.0%), VE Couplets were rare (<1.0%), and no VE Triplets were present. Ventricular Bigeminy was present.    CT cardiac scoring (10/25/2023):  IMPRESSION:  1. Coronary artery calcium score of 84.8*.      *Coronary Artery  Agatston score      Score  risk  Very low 1-99  Mildly increased 100-299  Moderately increased >300  Moderate to severely increased >800           Assessment/Plan   Hx of PE on Xarelto. Patient as a history of episodes of near syncope and syncope. Event monitor revealed episodes of " atrial flutter (longest lasting ~ 7 minutes) and atrial tach with rates up to 182 bpm. She is currently on Xarelto (hx of PE). CT calcium score of 84 (RCA). She is on Flecainide 50mg bid. Patient underwent loop recorder implant on 11/30/2023. Monitor shows episodes of tachycardia lasting a few seconds to a few minutes with rates between 130s-140s. She is asymptomatic during those episodes. Her ECG today reveals normal sinus rhythm. She has been following with neurology for neuropathic dysautonomia as noticed on autonomic nervous system testing with associated supine hypertension and orthostatic hypotension. Her  states that she has been SOB on exertion. She had a recent echo, results not yet available. Will review her echo and get back to him.     Abel Jimenez MD

## 2024-06-06 ENCOUNTER — EVALUATION (OUTPATIENT)
Dept: PHYSICAL THERAPY | Facility: CLINIC | Age: 84
End: 2024-06-06
Payer: MEDICARE

## 2024-06-06 VITALS — HEART RATE: 73 BPM | OXYGEN SATURATION: 97 % | DIASTOLIC BLOOD PRESSURE: 80 MMHG | SYSTOLIC BLOOD PRESSURE: 162 MMHG

## 2024-06-06 DIAGNOSIS — G20.C PARKINSONISM, UNSPECIFIED PARKINSONISM TYPE (MULTI): ICD-10-CM

## 2024-06-06 DIAGNOSIS — R26.9 GAIT DISORDER: ICD-10-CM

## 2024-06-06 DIAGNOSIS — R29.898 WEAKNESS OF BOTH LOWER EXTREMITIES: Primary | ICD-10-CM

## 2024-06-06 PROCEDURE — 97161 PT EVAL LOW COMPLEX 20 MIN: CPT | Mod: GP | Performed by: PHYSICAL THERAPIST

## 2024-06-06 PROCEDURE — 97110 THERAPEUTIC EXERCISES: CPT | Mod: GP | Performed by: PHYSICAL THERAPIST

## 2024-06-06 ASSESSMENT — BALANCE ASSESSMENTS
PICK UP OBJECT FROM THE FLOOR FROM A STANDING POSITION: ABLE TO PICK UP SLIPPER SAFELY AND EASILY
STANDING TO SITTING: SITS SAFELY WITH MINIMAL USE OF HANDS
STANDING UNSUPPORTED ONE FOOT IN FRONT: ABLE TO TAKE SMALL STEP INDEPENDENTLY AND HOLD 30 SECONDS
STANDING TO SITTING: ABLE TO STAND WITHOUT USING HANDS AND STABILIZE INDEPENDENTLY
REACHING FORWARD WITH OUTSTRETCHED ARM WHILE STANDING: CAN REACH FORWARD CONFIDENTLY 25 CM (10 INCHES)
TRANSFERS: ABLE TO TRANSFER SAFELY WITH MINOR USE OF HANDS
SITTING WITH BACK UNSUPPORTED BUT FEET SUPPORTED ON FLOOR OR ON A STOOL: ABLE TO SIT SAFELY AND SECURELY FOR 2 MINUTES
LONG VERSION TOTAL SCORE (MAX 56): 50
STANDING UNSUPPORTED WITH EYES CLOSED: ABLE TO STAND 10 SECONDS SAFELY
PLACE ALTERNATE FOOT ON STEP OR STOOL WHILE STANDING UNSUPPORTED: ABLE TO COMPLETE 4 STEPS WITHOUT AID WITH SUPERVISION
PLACE ALTERNATE FOOT ON STEP OR STOOL WHILE STANDING UNSUPPORTED: LOOKS BEHIND FROM BOTH SIDES AND WEIGHT SHIFTS WELL
STANDING UNSUPPORTED WITH FEET TOGETHER: ABLE TO PLACE FEET TOGETHER INDEPENDENTLY AND STAND 1 MINUTE SAFELY
STANDING ON ONE LEG: ABLE TO LIFT LEG INDEPENDENTLY AND HOLD GREATER THAN OR EQUAL TO 3 SECONDS
TURN 360 DEGREES: ABLE TO TURN 360 DEGREES SAFELY IN 4 SECONDS OR LESS
STANDING UNSUPPORTED: ABLE TO STAND SAFELY FOR 2 MINUTES

## 2024-06-06 ASSESSMENT — ENCOUNTER SYMPTOMS
LOSS OF SENSATION IN FEET: 0
DEPRESSION: 0
OCCASIONAL FEELINGS OF UNSTEADINESS: 0

## 2024-06-06 ASSESSMENT — PAIN SCALES - GENERAL: PAINLEVEL_OUTOF10: 0 - NO PAIN

## 2024-06-06 ASSESSMENT — PAIN - FUNCTIONAL ASSESSMENT: PAIN_FUNCTIONAL_ASSESSMENT: 0-10

## 2024-06-06 NOTE — PROGRESS NOTES
Physical Therapy     Neuro Evaluation    Patient Name: Lorie Ding  MRN: [id number]  Today's Date: 06/06/24     Time Calculation  Start Time: 1506  Stop Time: 1555  Time Calculation (min): 49 min      Assessment:   PT Assessment Results: Decreased strength, Decreased endurance, Decreased mobility, Impaired balance, Decreased cognition  Strengths: Support of Caregivers  Barriers to Participation: Comorbidities, Premorbid level of function (Cognitive deficits, history of syncope)  Patient is an 83  year old who presents to Physical therapy secondary to frequent falls associated with Orthostatic hypotension. Upon PT evaluation the patient is presenting with the following deficits: weakness in B LE and endurance deficits with mild SOB noted during PT evaluation.   Patient scored WNL on WEAVER, TUG, and 5 x sit to stand but was fatigued easily.  Patient does present with narrow base of support and poor trunk rotation during gait. The patient was active prior and has been very limited secondary to multiple episodes of syncope.   Secondary to the above deficits the patient will benefit from skilled PT intervention to allow the patient to progress to the goal of improving strength and endurance.  PT will monitor progress towards goals and adjust intervention as appropriate.        Plan:  Treatment/Interventions: Education/ Instruction, Gait training, Therapeutic activities, Therapeutic exercises  PT Plan: Skilled PT  PT Frequency: 2 times per week  Duration: 4 weeks  Onset Date: 05/10/24  Number of Treatments Authorized: MN  Rehab Potential: Excellent  Plan of Care Agreement: Patient, Guardian    Current Problem:   1. Weakness of both lower extremities        2. Parkinsonism, unspecified Parkinsonism type (Multi)  Referral to Physical Therapy    Follow Up In Physical Therapy      3. Gait disorder  Referral to Physical Therapy    Follow Up In Physical Therapy          Subjective   General Visit Information:  General  Reason  "for Referral: G20.C (ICD-10-CM) - Parkinsonism, unspecified Parkinsonism type (Multi)  R26.9 (ICD-10-CM) - Gait disorder  Referred By: Dr. Alfred  Past Medical History Relevant to Rehab: HTN, CKD,DVT, PE, IVC filter, orthostatic hypotension  Family/Caregiver Present: Yes ( is [place] - very supportive)  General Comment: Onset date 5/10/24, Medicare no auth, Visit 1     reported syncope began in 2021 and last episode was March 21, 2024.  Activity has been declining secondary to limitations associated with syncope.  Patient reported she is asymptomatic when episode occur.     Goal: to improve strength and orthostatic hypotension  Precautions:  Precautions  STEADI Fall Risk Score (The score of 4 or more indicates an increased risk of falling): 2  Precautions Comment: Per note from Dr. Miranda patient has significant hypotension and has had prior episodes of syncope without symptoms - to be wearing XI hose and abdominal binder (note form Dr. Alfred on 5/9/24) : PT's recomendation *Gradual progression of exericse with monitoring of vitals during sessions* - patient does not have abdominal binder (message was sent to Dr. Alfred regarding)   Vital Signs:  Vital Signs  Heart Rate: 73  SpO2: 97 %  BP: 162/80  BP Location: Right arm  BP Method: Automatic  Pain:  Pain Assessment  Pain Assessment: 0-10  Pain Score: 0 - No pain  Home Living:  Type of Home: House  Lives With: Spouse  Home Adaptive Equipment:  (tub bench)  Home Layout: Multi-level  Home Access: Stairs to enter without rails  Prior Level of Function:  Prior Function Comments: Patient is independent around the home but  is always nearby.   provides CGA when leaving the home.  (secondary to cognitive deficits and orthostatic hypotension)    Objective   Cognition:  Overall Cognitive Status:  (flat affect - reporting \"I am fine\" to all questions)    Observation:  Observation Comment: L eyelid droop, bandaid on chin.  No tremors at rest but did " notice tremors during LE movement (MMT)     Balance: Static Sitting Balance  Static Sitting-Comment/Number of Minutes: Good  Static Standing Balance  Static Standing-Comment/Number of Minutes: Good  and Dynamic Standing Balance  Dynamic Standing-Comments: Able to stand x 2 minutes with SBA    Transfer/Mobility Assessment:     Ambulation/Gait Training 1  Surface 1: Level tile  Device 1: No device  Quality of Gait 1: Decreased step length (decreasd B foot clearance, poor UE swing)  Comments/Distance (ft) 1: 100   Stairs  Rails 1: Right  Device 1: Railing  Assistance 1: Close supervision  Comment/Number of Steps 1: 4 reciprical    Extremities Assessment: RLE   RLE : Exceptions to WFL  Strength RLE  R Hip Flexion: 4-/5  R Hip ABduction: 4-/5  R Knee Flexion: 4/5  R Knee Extension: 4/5  R Ankle Dorsiflexion: 4/5  R Ankle Plantar Flexion: 4/5 and LLE   LLE : Exceptions to WFL  Strength LLE  LLE Overall Strength:  (mild SOB noted during testing today, vitals remained WNL but required rest breaks -During exercise VyX253%, HR 89 bpm, /74)  L Hip Flexion: 4-/5  L Hip ABduction: 4-/5  L Knee Flexion: 4/5  L Knee Extension: 4/5  L Ankle Dorsiflexion: 4/5  L Ankle Plantar Flexion: 4/5    Outcome Measures:  Yoo Balance Scale  1. Sitting to Standing: Able to stand without using hands and stabilize independently  2. Standing Unsupported: Able to stand safely for 2 minutes  3. Sitting with Back Unsupported but Feet Supported on Floor or on a Stool: Able to sit safely and securely for 2 minutes  4. Standing to Sitting: Sits safely with minimal use of hands  5.  Transfers: Able to transfer safely with minor use of hands  6. Standing Unsupported with Eyes Closed: Able to stand 10 seconds safely  7. Standing Unsupported with Feet Together: Able to place feet together independently and stand 1 minute safely  8. Reach Forward with Outstretched Arm While Standing: Can reach forward confidently 25 cm (10 inches)  9.  Object  from Floor from a Standing Position: Able to  slipper safely and easily  10. Turning to Look Behind Over Left and Right Shoulders While Standing: Looks behind from both sides and weight shifts well  11. Turn 360 Degrees: Able to turn 360 degrees safely in 4 seconds or less  12. Place Alternate Foot on Step or Stool While Standing Unsupported: Able to complete 4 steps without aid with supervision  13. Standing Unsupported One Foot in Front: Able to take small step independently and hold 30 seconds  14. Standing on One Leg: Able to lift leg independently and hold greater than or equal to 3 seconds  Yoo Balance Score: 50    Other Measures  5x Sit to Stand: without UE support 9.72 seconds  Dizziness Handicap Inventory: 80%  Other Outcome Measures: TUG without device: 9.6 seconds, 8.66 seconds     OP EDUCATION:  Outpatient Education  Individual(s) Educated: Spouse, Patient  Education Provided: Home Exercise Program, Physiology, POC  Risk and Benefits Discussed with Patient/Caregiver/Other: yes  Patient/Caregiver Demonstrated Understanding: yes  Plan of Care Discussed and Agreed Upon: yes  Patient Response to Education: Patient/Caregiver Performed Return Demonstration of Exercises/Activities, Patient/Caregiver Verbalized Understanding of Information  Education Comment: Educated on safety and slow progression of exercise, monitor HR, take rest breaks  Therapeutic Exercise:   19 MINUTES  Therapeutic exercises completed this date to improve strength and postural control to improve ability to perform functional activities safely.       Patient was educated on HEP and performed the following exercises during session today: Access Code: [email or web address]  URL: [email or web address]  Date: 06/06/2024  Prepared by: Cesia Best    Exercises  - Supine Bridge  - 1 x daily - 5 x weekly - 1-2 sets - 10 reps  - Sidelying Hip Abduction  - 1 x daily - 5 x weekly - 1-2 sets - 10 reps  - Supine Active Straight Leg  Raise  - 1 x daily - 5 x weekly - 1-2 sets - 10 reps  - Seated Heel Raise  - 1 x daily - 5 x weekly - 2 sets - 10 reps  - Seated Long Arc Quad  - 1 x daily - 5 x weekly - 2 sets - 10 reps  PT educated patient on importance of consistency with HEP to achieve best results and to perform exercises within pain free range.        Goals:  Active       PT Problem       Patient to demonstrate improved endurance as evidenced by ability to perform 10 minutes on sci fit        Start:  06/06/24    Expected End:  07/18/24            Patient will demonstrate ability to ambulate on even and uneven surfaces safely without device       Start:  06/06/24    Expected End:  07/18/24            Patient will demonstrate improved B LE strength to 4+/5 to allow for improved endurance and safety with mobility.        Start:  06/06/24    Expected End:  07/18/24            Patient will demonstrate independence and compliance with safe and appropriate HEP for pain reduction, ROM/flexibility, core and BLE strength and postural correction.        Start:  06/06/24    Expected End:  07/18/24            Patient will demonstrate improved score on ABC by 10% to demonstrate improved subjective functional mobility and ability to perform daily activities.          Start:  06/06/24    Expected End:  07/18/24

## 2024-06-10 ENCOUNTER — APPOINTMENT (OUTPATIENT)
Dept: PHYSICAL THERAPY | Facility: CLINIC | Age: 84
End: 2024-06-10
Payer: MEDICARE

## 2024-06-10 ENCOUNTER — APPOINTMENT (OUTPATIENT)
Dept: CARDIOLOGY | Facility: HOSPITAL | Age: 84
End: 2024-06-10
Payer: MEDICARE

## 2024-06-10 ENCOUNTER — DOCUMENTATION (OUTPATIENT)
Dept: PHYSICAL THERAPY | Facility: CLINIC | Age: 84
End: 2024-06-10
Payer: MEDICARE

## 2024-06-10 ENCOUNTER — HOSPITAL ENCOUNTER (OUTPATIENT)
Dept: CARDIOLOGY | Facility: HOSPITAL | Age: 84
Discharge: HOME | End: 2024-06-10
Payer: MEDICARE

## 2024-06-10 DIAGNOSIS — R55 SYNCOPE, UNSPECIFIED SYNCOPE TYPE: ICD-10-CM

## 2024-06-10 PROCEDURE — 93306 TTE W/DOPPLER COMPLETE: CPT

## 2024-06-11 NOTE — PROGRESS NOTES
"Physical Therapy    Physical Therapy Treatment    Patient Name: Lorie Ding  MRN: [id number]    Today's Date: 6/12/2024  Time Calculation  Start Time: 1403  Stop Time: 1443  Time Calculation (min): 40 min    Assessment:   In-clinic program initiated this date. Patient with flat affect and when this PT would ask how patient was feeling during exercises, she responded \"I'm fine.\" Patient noted no lightheadedness, pain, or other symptoms during session this date and appeared to do well with exercises. Patient required intermittent verbal cueing throughout session for correct exercise technique. Patient's  present during session and is very supportive. Vitals monitored throughout session in seated and appeared to remain stable. Patient with no instances of instability/LOB when ambulating in clinic this date. Patient and  educated to monitor BP at home with exercises as needed and to contact doctor for elevated or low levels. Patient and  also educated on correct wear/donning of compression garments and importance of regular cleaning to avoid skin irritation. Patient left therapy in no distress at end of session.     Plan:   Progress POC as able and tolerated by patient. Adjust plan as needed.  Continue to monitor vitals throughout session.     Current Problem  1. Parkinsonism, unspecified Parkinsonism type (Multi)  Follow Up In Physical Therapy      2. Gait disorder  Follow Up In Physical Therapy          General     General  Reason for Referral: G20.C (ICD-10-CM) - Parkinsonism, unspecified Parkinsonism type (Multi)  R26.9 (ICD-10-CM) - Gait disorder  Referred By: Dr. Alfred  Past Medical History Relevant to Rehab: HTN, CKD,DVT, PE, IVC filter, orthostatic hypotension  Family/Caregiver Present: Yes ( is [facility] - very supportive)  General Comment: Onset date 5/10/24, Medicare no auth, Visit 2    Subjective    The patient reports she is feeling \"fine\" today. Notes no dizziness or lightheadedness. " No pain anywhere. Notes she felt good after evaluation. Patient notes HEP went fine. Did them Friday, Saturday, Monday, and Tuesday. No issues with HEP. No new episodes of passing out. Cancelled last session because they felt it was too close to IE.     No other new changes or new complaints to note.   Notes she has been wearing the same compression stockings for 2 weeks, have not washed them. Is wondering if they are on right.   Patient's , Brett assisted with subjective questioning.     Precautions  Precautions  Precautions Comment: Per note from Dr. Miranda patient has significant hypotension and has had prior episodes of syncope without symptoms - to be wearing XI hose and abdominal binder (note form Dr. Alfred on 5/9/24) : PT's recomendation *Gradual progression of exericse with monitoring of vitals during sessions*    Vital Signs  All in seated:   Start of Session:   Vital Signs  Heart Rate: 84  SpO2: 99 %  BP: 136/68  BP Location: Left arm  BP Method: Automatic    Around mid Point of Session:   BP: 150/69; HR: 81 bpm (left arm)   SPO2: 99%     End of session:   BP: 149/72; HR: 82 bpm, SPO2: 99%     Pain   0/10 pain at start of session and end of session. Patient left therapy in no distress with .       Objective   Patient presents to therapy ambulating without use of assistive device. Patient with B compression stockings donned (rolled down) and band aid present on chin.     Treatments:  Therapeutic Exercise (10973)-30 min   -Sci fit, seat 11, L1 x 2 min then rest break (SPO2: 97%, HR: 91 bpm after 1st set of 2 min) ; x 2 min a second time (SPO2: 96%, HR: 91 bpm)   Seated:   -B heel raises 2 x 10 reps   -R/L LAQ 2 x 10 reps, 5 sec hold  -B hip adduction ball squeeze 2 x 10 reps, 3 sec hold   -Seated marches alt R/L 2 x 20 reps (20 total on each leg)   Patient noted she did not feel tired at end of session.     Therapeutic Activity (53444)-10 min   -Patient and  educated on care of  compression stockings and importance of cleaning them daily to prevent skin irritation  -Patient and  educated on proper donning of compression stockings and to avoid rolling them as this causes uneven pressure distribution   -PT unrolled patient's stockings and they appeared to fit correctly. PT had lymphedema OT Jessica Jerome confirm appropriate fit    OP EDUCATION:   See above and in assessment. PT educated patient and  to monitor patient's BP at home with HEP as needed.    Goals:  Active       PT Problem       Patient to demonstrate improved endurance as evidenced by ability to perform 10 minutes on sci fit        Start:  06/06/24    Expected End:  07/18/24            Patient will demonstrate ability to ambulate on even and uneven surfaces safely without device       Start:  06/06/24    Expected End:  07/18/24            Patient will demonstrate improved B LE strength to 4+/5 to allow for improved endurance and safety with mobility.        Start:  06/06/24    Expected End:  07/18/24            Patient will demonstrate independence and compliance with safe and appropriate HEP for pain reduction, ROM/flexibility, core and BLE strength and postural correction.        Start:  06/06/24    Expected End:  07/18/24            Patient will demonstrate improved score on ABC by 10% to demonstrate improved subjective functional mobility and ability to perform daily activities.          Start:  06/06/24    Expected End:  07/18/24

## 2024-06-12 ENCOUNTER — TREATMENT (OUTPATIENT)
Dept: PHYSICAL THERAPY | Facility: CLINIC | Age: 84
End: 2024-06-12
Payer: MEDICARE

## 2024-06-12 ENCOUNTER — APPOINTMENT (OUTPATIENT)
Dept: NEPHROLOGY | Facility: CLINIC | Age: 84
End: 2024-06-12
Payer: MEDICARE

## 2024-06-12 VITALS — OXYGEN SATURATION: 99 % | SYSTOLIC BLOOD PRESSURE: 136 MMHG | DIASTOLIC BLOOD PRESSURE: 68 MMHG | HEART RATE: 84 BPM

## 2024-06-12 DIAGNOSIS — I26.09 OTHER PULMONARY EMBOLISM WITH ACUTE COR PULMONALE (MULTI): ICD-10-CM

## 2024-06-12 DIAGNOSIS — G20.C PARKINSONISM, UNSPECIFIED PARKINSONISM TYPE (MULTI): ICD-10-CM

## 2024-06-12 DIAGNOSIS — R26.9 GAIT DISORDER: ICD-10-CM

## 2024-06-12 PROCEDURE — 97110 THERAPEUTIC EXERCISES: CPT | Mod: GP

## 2024-06-12 PROCEDURE — 97530 THERAPEUTIC ACTIVITIES: CPT | Mod: GP

## 2024-06-13 RX ORDER — RIVAROXABAN 20 MG/1
TABLET, FILM COATED ORAL
Qty: 90 TABLET | Refills: 1 | Status: SHIPPED | OUTPATIENT
Start: 2024-06-13

## 2024-06-17 ENCOUNTER — LAB (OUTPATIENT)
Dept: LAB | Facility: LAB | Age: 84
End: 2024-06-17
Payer: MEDICARE

## 2024-06-17 DIAGNOSIS — E55.9 VITAMIN D DEFICIENCY: ICD-10-CM

## 2024-06-17 DIAGNOSIS — I10 BENIGN ESSENTIAL HYPERTENSION: ICD-10-CM

## 2024-06-17 DIAGNOSIS — N18.32 STAGE 3B CHRONIC KIDNEY DISEASE (MULTI): ICD-10-CM

## 2024-06-17 LAB
25(OH)D3 SERPL-MCNC: 60 NG/ML (ref 30–100)
ALBUMIN SERPL BCP-MCNC: 3.9 G/DL (ref 3.4–5)
ANION GAP SERPL CALC-SCNC: 15 MMOL/L (ref 10–20)
AORTIC VALVE MEAN GRADIENT: 6 MMHG
AORTIC VALVE PEAK VELOCITY: 1.61 M/S
AV PEAK GRADIENT: 10.4 MMHG
AVA (PEAK VEL): 1.91 CM2
AVA (VTI): 1.8 CM2
BUN SERPL-MCNC: 33 MG/DL (ref 6–23)
CALCIUM SERPL-MCNC: 9.9 MG/DL (ref 8.6–10.6)
CHLORIDE SERPL-SCNC: 104 MMOL/L (ref 98–107)
CO2 SERPL-SCNC: 26 MMOL/L (ref 21–32)
CREAT SERPL-MCNC: 1.61 MG/DL (ref 0.5–1.05)
EGFRCR SERPLBLD CKD-EPI 2021: 32 ML/MIN/1.73M*2
EJECTION FRACTION APICAL 4 CHAMBER: 63.7
ERYTHROCYTE [DISTWIDTH] IN BLOOD BY AUTOMATED COUNT: 12.9 % (ref 11.5–14.5)
FERRITIN SERPL-MCNC: 91 NG/ML (ref 8–150)
GLUCOSE SERPL-MCNC: 85 MG/DL (ref 74–99)
HCT VFR BLD AUTO: 43.7 % (ref 36–46)
HGB BLD-MCNC: 14.5 G/DL (ref 12–16)
IRON SATN MFR SERPL: 38 % (ref 25–45)
IRON SERPL-MCNC: 143 UG/DL (ref 35–150)
LEFT ATRIUM VOLUME AREA LENGTH INDEX BSA: 15.7 ML/M2
LEFT VENTRICLE INTERNAL DIMENSION DIASTOLE: 3.92 CM (ref 3.5–6)
LEFT VENTRICULAR OUTFLOW TRACT DIAMETER: 2 CM
LV EJECTION FRACTION BIPLANE: 63 %
MAGNESIUM SERPL-MCNC: 2.22 MG/DL (ref 1.6–2.4)
MCH RBC QN AUTO: 34 PG (ref 26–34)
MCHC RBC AUTO-ENTMCNC: 33.2 G/DL (ref 32–36)
MCV RBC AUTO: 103 FL (ref 80–100)
MITRAL VALVE E/A RATIO: 0.92
MITRAL VALVE E/E' RATIO: 8.04
NRBC BLD-RTO: 0 /100 WBCS (ref 0–0)
PHOSPHATE SERPL-MCNC: 3.5 MG/DL (ref 2.5–4.9)
PLATELET # BLD AUTO: 247 X10*3/UL (ref 150–450)
POTASSIUM SERPL-SCNC: 5 MMOL/L (ref 3.5–5.3)
PTH-INTACT SERPL-MCNC: 69.5 PG/ML (ref 18.5–88)
RBC # BLD AUTO: 4.26 X10*6/UL (ref 4–5.2)
RIGHT VENTRICLE FREE WALL PEAK S': 10.9 CM/S
RIGHT VENTRICLE PEAK SYSTOLIC PRESSURE: 48.4 MMHG
SODIUM SERPL-SCNC: 140 MMOL/L (ref 136–145)
TIBC SERPL-MCNC: 375 UG/DL (ref 240–445)
TRICUSPID ANNULAR PLANE SYSTOLIC EXCURSION: 1.8 CM
UIBC SERPL-MCNC: 232 UG/DL (ref 110–370)
WBC # BLD AUTO: 6.9 X10*3/UL (ref 4.4–11.3)

## 2024-06-17 PROCEDURE — 83970 ASSAY OF PARATHORMONE: CPT

## 2024-06-17 PROCEDURE — 83550 IRON BINDING TEST: CPT

## 2024-06-17 PROCEDURE — 82306 VITAMIN D 25 HYDROXY: CPT

## 2024-06-17 PROCEDURE — 85027 COMPLETE CBC AUTOMATED: CPT

## 2024-06-17 PROCEDURE — 83735 ASSAY OF MAGNESIUM: CPT

## 2024-06-17 PROCEDURE — 36415 COLL VENOUS BLD VENIPUNCTURE: CPT

## 2024-06-17 PROCEDURE — 83540 ASSAY OF IRON: CPT

## 2024-06-17 PROCEDURE — 82728 ASSAY OF FERRITIN: CPT

## 2024-06-17 PROCEDURE — 80069 RENAL FUNCTION PANEL: CPT

## 2024-06-19 ENCOUNTER — APPOINTMENT (OUTPATIENT)
Dept: NEPHROLOGY | Facility: CLINIC | Age: 84
End: 2024-06-19
Payer: MEDICARE

## 2024-06-19 VITALS
WEIGHT: 150.2 LBS | OXYGEN SATURATION: 97 % | HEIGHT: 66 IN | SYSTOLIC BLOOD PRESSURE: 122 MMHG | RESPIRATION RATE: 16 BRPM | HEART RATE: 76 BPM | TEMPERATURE: 96.7 F | DIASTOLIC BLOOD PRESSURE: 78 MMHG | BODY MASS INDEX: 24.14 KG/M2

## 2024-06-19 DIAGNOSIS — N18.32 STAGE 3B CHRONIC KIDNEY DISEASE (MULTI): Primary | ICD-10-CM

## 2024-06-19 DIAGNOSIS — I10 BENIGN ESSENTIAL HYPERTENSION: ICD-10-CM

## 2024-06-19 DIAGNOSIS — I10 HYPERTENSION, UNSPECIFIED TYPE: ICD-10-CM

## 2024-06-19 PROCEDURE — 1159F MED LIST DOCD IN RCRD: CPT | Performed by: INTERNAL MEDICINE

## 2024-06-19 PROCEDURE — 3078F DIAST BP <80 MM HG: CPT | Performed by: INTERNAL MEDICINE

## 2024-06-19 PROCEDURE — 3074F SYST BP LT 130 MM HG: CPT | Performed by: INTERNAL MEDICINE

## 2024-06-19 PROCEDURE — 99214 OFFICE O/P EST MOD 30 MIN: CPT | Performed by: INTERNAL MEDICINE

## 2024-06-19 RX ORDER — LOSARTAN POTASSIUM 25 MG/1
25 TABLET ORAL DAILY
Qty: 90 TABLET | Refills: 3 | Status: SHIPPED | OUTPATIENT
Start: 2024-06-19 | End: 2025-06-19

## 2024-06-19 NOTE — PATIENT INSTRUCTIONS
Dear TEJAS   It was nice seeing you in the nephrology clinic today     Today we discussed the following:     #Chronic kidney disease stage III-your kidney function is stable recently at 30-35%-will continue to monitor.  Baseline serum creatinine 1.5-1.7     #Hypertension-blood pressure is  In good control.  Will drop losartan down to 25 mg from current 50 mg to allow for the kidney function to improve some.  No protein leak in the urine     #Limit salt intake. Ensure 40-50 ounces of fluid intake daily     #Vitamin D deficiency-continue vitamin D daily     #Osteoporosis-exercise regularly.  holding alendronate    # Recurrent fainting episodes-getting evaluated by cardiology and neurology    # Trace leg swelling-improved from prior.  Will defer starting water pill at this time will monitor    # Possible skin urticaria on the right arm-please discuss with PCP     Follow-up in 6 months with repeat blood work and urinalysis prior to next visit     Please call our office if you have any question  Thank you for coming to see me today     Shweta Garcia MD, MS, FRED LOMAS  Clinical  - [employer]  Nephrologist - [facility]

## 2024-06-19 NOTE — PROGRESS NOTES
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  Rechecking today with her .  She reports continuous dizziness.  She fell recently.  Blood pressure is accepted today.  She continues to be on losartan 50 mg.  Prior urinalysis came back with no albuminuria.  Serum creatinine is hovering between 1.4-1.6 and GFR around 30 relatively stable.  She is currently off bisphosphonate/alendronate.  She had blood work done recently that showed stable serum creatinine 1.6 and GFR 32 and within normal electrolytes with no significant acidosis.  No significant anemia.  With a normal vitamin D.  With normal calcium and magnesium.  We discussed conservative measures today.  Will drop losartan to address concerns about dizziness and possible hemodynamic injury.    Prior notes    Last office visit June 2023.  We followed conservative measures.  Today, Lorie came with her  and her son.  No kidney related complaints or concerns.  Recent episodes of recurrent fainting-under cardiology/neurology evaluation.  She received internal cardiac monitor.  She denies palpitation today.  She denies shortness of breath or chest pain.  Blood pressure is excellent.  Recent blood work that showed stable improved serum creatinine and GFR.  Is normal electrolytes.  No albuminuria    Prior notes     Last office visit November 2022. In interim, she was admitted to the hospital in December 2022 after syncopal episode and was found to have COVID-19 and acute kidney injury. Today, she came with her . No kidney related concerns or complaints. She had blood work done recently and all results were discussed with him in details including serum creatinine back to baseline and within normal extra lites. Blood  "pressure is accepted today. No lower urinary tract symptoms     Per prior notes     Last initial office visit was August 2022. We discussed conservative measures. We continued RAAS inhibitor. We encouraged increased fluid intake and limit salt intake. We recommended holding alendronate temporarily. In interim, she feels well. She had blood work done recently and all results were discussed with her and her  in detail today including stable serum creatinine 1.3 and GFR 38 and within normal electrolytes including calcium. Vitamin D is slightly low-currently not on vitamin D supplements. Urinalysis bland with no blood or albumin. Lorie came today with her  and all questions were answered. They are planning to go to [place] over the winter        Per prior notes     Lorie came today with her . She reports feeling in her baseline state of health. She denies any urinary tract issues. She denies chest pain or shortness of breath. She follows low-salt diet. She thinks she is not drinking much fluid. She has been on bisphosphonate for a year. She denies blood or foam in the urine. Urine dipstick today is bland. Blood pressure is accepted. No significant NSAID use at home     Family history: No kidney issues run in the family  Social history: . Used to work as an , non-smoker       Objective   /78 (BP Location: Left arm, Patient Position: Standing, BP Cuff Size: Adult)   Pulse 76   Temp 35.9 °C (96.7 °F)   Resp 16   Ht 1.664 m (5' 5.5\")   Wt 68.1 kg (150 lb 3.2 oz)   SpO2 97%   BMI 24.61 kg/m²   Wt Readings from Last 3 Encounters:   06/19/24 68.1 kg (150 lb 3.2 oz)   06/03/24 68.3 kg (150 lb 9.2 oz)   05/09/24 65.8 kg (145 lb)       Physical Exam    General appearance: no distress awake and alert on room air, euvolemic on exam  Eyes: non-icteric  HEENT: atrumatic head, PEERLA, moist mucosa  Skin: no apparent rash  Heart: NSR, S1, S2 normal, no murmur or gallop.  " "Internal heart monitor noticed  Lungs: Symmetrical expansion,CTA bilat no wheezing/crackles  Abdomen: soft, nt/nd, obese  Extremities: Trace edema bilateral, compression stocking applied.  Right arm erythema looks like skin allergy  Neuro: No FND,asterixis, no focal deficits noticed        Review of Systems     Constitutional: no fever, no chills, no recent weight gain and no recent weight loss.   Eyes: no blurred vision and no diplopia.   ENT: no hearing loss, no earache, no sore throat, no swollen glands in the neck and no nasal discharge.   Cardiovascular: no chest pain, no palpitations and no lower extremity edema.   Respiratory: no shortness of breath, no chronic cough and no shortness of breath during exertion.   Gastrointestinal: no abdominal pain, no constipation, no heartburn, no vomiting, no bloody stools and no change in bowel movements.   Genitourinary: no dysuria and no hematuria.   Musculoskeletal: no arthralgias and no myalgias.   Skin: no rashes and no skin lesions.   Neurologica dizziness  Psychiatric: no confusion, no depression and no anxiety.   Endocrine: no heat intolerance, no cold intolerance, appetite not increased, no thyroid disorder, no increased urinary frequency and no dry skin.   Hematologic/Lymphatic: does not bleed easily and does not bruise easily.   All other systems have been reviewed and are negative for complaint.         Data Review        Results from last 7 days   Lab Units 06/17/24  1154   WBC AUTO x10*3/uL 6.9   HEMOGLOBIN g/dL 14.5   HEMATOCRIT % 43.7   PLATELETS AUTO x10*3/uL 247            No results found for: \"URICACID\"        No results found for: \"HGBA1C\"        Results from last 7 days   Lab Units 06/17/24  1154   SODIUM mmol/L 140   POTASSIUM mmol/L 5.0   CHLORIDE mmol/L 104   CO2 mmol/L 26   BUN mg/dL 33*   GLUCOSE mg/dL 85   CALCIUM mg/dL 9.9   ANION GAP mmol/L 15   EGFR mL/min/1.73m*2 32*           Albumin/Creatine Ratio   Date Value Ref Range Status   12/11/2023 " 13.3 <30.0 ug/mg Creat Final   06/07/2023 5.0 0.0 - 30.0 ug/mg crt Final   11/28/2022 4.2 0.0 - 30.0 ug/mg crt Final            RFP  Recent Labs     06/17/24  1154 05/13/24  1608 12/11/23  1339 07/31/23  1642 05/10/23  1501 12/17/22  0619 12/16/22  2154 11/28/22  1330    139 142 138 140 137 136 140   K 5.0 4.5 4.5 4.5 4.7 3.9 4.0 4.6    105 105 103 104 107 101 104   CO2 26 26 26 27 26 23 27 26   BUN 33* 32* 17 25* 19 16 18 17   CREATININE 1.61* 1.57* 1.37* 1.45* 1.36* 1.12* 1.53* 1.38*   GLUCOSE 85 105* 144* 96 100* 98 94 129*   CALCIUM 9.9 9.9 9.6 9.3 9.7 8.5* 9.5 9.9   PHOS 3.5  --  2.7  --   --  2.7  --  2.9   EGFR 32* 33* 39*  --   --   --   --   --    ANIONGAP 15 13 16 13 15 11 12 15        Urineanalysis  Recent Labs     07/31/23  1841 12/16/22  2330 11/28/22  1330   COLORU STRAW YELLOW YELLOW   APPEARANCEU CLEAR CLEAR CLEAR   SPECGRAVU 1.013 1.020 >1.030*   NANCY 6.0 5.5 5.5   PROTUR NEGATIVE NEGATIVE NEGATIVE   GLUCOSEU NEGATIVE NEGATIVE NEGATIVE   BLOODU NEGATIVE TRACE* NEGATIVE   KETONESU 5(Trace)* TRACE* NEGATIVE   BILIRUBINU NEGATIVE NEGATIVE NEGATIVE   NITRITEU NEGATIVE NEGATIVE NEGATIVE   LEUKOCYTESU NEGATIVE TRACE* NEGATIVE       Urine Electrolytes  Recent Labs     12/11/23  1339 07/31/23  1841 06/07/23  1625 12/16/22  2330 11/28/22  1330   CREATU 103.6  --  212.0  --  185.0   PROTUR  --  NEGATIVE  --  NEGATIVE NEGATIVE   ALBUMINUR 13.8  --   --   --   --    MICROALBCREA 13.3  --  5.0  --  4.2        Urine Micro  Recent Labs     12/16/22  2330   WBCU 2   RBCU <1   SQUAMEPIU 1   BACTERIAU 1+*   MUCUSU FEW        Iron  Recent Labs     06/17/24  1154 12/11/23  1339 12/16/22  2154   IRON 143 73  --    TIBC 375 346  --    IRONSAT 38 21*  --    FERRITIN 91 162* 151*            Current Outpatient Medications:     cholecalciferol (Vitamin D-3) 50 mcg (2,000 unit) capsule, Take 1 capsule (50 mcg) by mouth once daily., Disp: , Rfl:     cyanocobalamin, vitamin B-12, (Vitamin B-12) 1,000 mcg tablet  extended release, Take 1 tablet (1,000 mcg) by mouth once daily. as directed, Disp: , Rfl:     flecainide (Tambocor) 50 mg tablet, Take 1 tablet (50 mg) by mouth 2 times a day., Disp: 180 tablet, Rfl: 1    losartan (Cozaar) 50 mg tablet, Take 1 tablet (50 mg) by mouth once daily., Disp: 90 tablet, Rfl: 1    Xarelto 20 mg tablet, TAKE 1 TABLET (20 MG) BY MOUTH ONCE DAILY. 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  New baseline serum creatinine 1.5-1.7, new baseline GFR around 30 mill per minute per 1.73 m²  -I am concerned about possible bisphosphonate induced renal toxicity albeit her urine dipstick was bland-currently bisphosphonate is on hold. I counseled regarding conservative measures, limit salt intake, increase fluid intake and temporary continue to hold bisphosphonate.  Will drop the dose of her centimeters today.  Will monitor closely  -Kidney ultrasound is unremarkable in June 2022  -Urine analysis is bland with no albumin or blood.  Negative spot test ACR-will monitor     #Hypertension-accepted  -Current medication losartan 50 mg-will drop to 25 mg.  Negative albuminuria and possible hemodynamic injury causing dizziness     #CKD/MBD  -She has a diagnosis of osteoporosis on alendronate 70 mg weekly-currently on hold  -Vitamin D is low-continue D3 supplements within normal phosphorus, calcium, albumin, PTH.      #No significant anemia hemoglobin 13.7. Negative SPEP     #CVS/PE-on Xarelto     Follow-up in 6 months with repeat blood work and urinalysis prior to visit              Shweta Garcia MD, MS, ABEBE, FRED Akhtar   - [facility] [employer]  Nephrologist - [facility]

## 2024-06-25 ENCOUNTER — HOSPITAL ENCOUNTER (OUTPATIENT)
Dept: CARDIOLOGY | Facility: CLINIC | Age: 84
Discharge: HOME | End: 2024-06-25
Payer: MEDICARE

## 2024-06-25 DIAGNOSIS — R55 SYNCOPE AND COLLAPSE: ICD-10-CM

## 2024-06-25 DIAGNOSIS — Z45.09 ENCOUNTER FOR LOOP RECORDER CHECK: ICD-10-CM

## 2024-06-28 ENCOUNTER — TREATMENT (OUTPATIENT)
Dept: PHYSICAL THERAPY | Facility: CLINIC | Age: 84
End: 2024-06-28
Payer: MEDICARE

## 2024-06-28 ENCOUNTER — APPOINTMENT (OUTPATIENT)
Dept: PRIMARY CARE | Facility: CLINIC | Age: 84
End: 2024-06-28
Payer: MEDICARE

## 2024-06-28 DIAGNOSIS — R26.9 GAIT DISORDER: ICD-10-CM

## 2024-06-28 DIAGNOSIS — G20.C PARKINSONISM, UNSPECIFIED PARKINSONISM TYPE (MULTI): ICD-10-CM

## 2024-06-28 PROCEDURE — 97110 THERAPEUTIC EXERCISES: CPT | Mod: GP

## 2024-06-28 NOTE — PROGRESS NOTES
"Physical Therapy    Physical Therapy Treatment    Patient Name: Lorie Ding  MRN: [id number]    Today's Date: 6/28/24     PT Therapeutic Procedures Time Entry  Therapeutic Exercise Time Entry: 43  Time Calculation  Start Time: 1100  Stop Time: 1143  Time Calculation (min): 43 min     Assessment:  Patient appeared to tolerate today's session well and reports she did not feel tired at end of session. Patient responded often with \"I'm fine\".   Vitals monitored throughout session in seated and appeared to remain stable. Patient with no instances of instability/LOB when ambulating in clinic this date.   Patient will continue to benefit from skilled PT services to address deficits/impairments contributing to limited functional abilities, independence and pain.      Plan:   Progress POC as able and tolerated by patient. Adjust plan as needed.  Continue to monitor vitals throughout session.     Current Problem  1. Parkinsonism, unspecified Parkinsonism type (Multi)  Follow Up In Physical Therapy       2. Gait disorder  Follow Up In Physical Therapy          General  General  Reason for Referral: G20.C (ICD-10-CM) - Parkinsonism, unspecified Parkinsonism type (Multi)  R26.9 (ICD-10-CM) - Gait disorder  Referred By: Dr. Alfred  Past Medical History Relevant to Rehab: HTN, CKD,DVT, PE, IVC filter, orthostatic hypotension  Family/Caregiver Present: Yes ( is [place] - very supportive)  General Comment: Onset date 5/10/24, Medicare no auth, Visit 3    Subjective   No problems with last session reported.   present for entire session.   reports she is doing exercises 4x/week. Hasn't fainted since March.     Precautions  Precautions  Precautions Comment: Per note from Dr. Miranda patient has significant hypotension and has had prior episodes of syncope without symptoms - to be wearing XI hose and abdominal binder (note form Dr. Alfred on 5/9/24) : PT's recomendation *Gradual progression of exericse with monitoring of " vitals during sessions*        Vital Signs  Start of Session:   Vital Signs  Heart Rate: 64  SpO2: 99 %    Mid point 11:31  97 SpO2%  84 bpm  133/74    At end of session  97 SpO2%  83 bpm  141/73    Pain  0/10 at the start  and end of session.      Treatments:Therapeutic Exercise (09828)-30 min   -Sci fit, seat 11, L1 x 2 min then rest break (SPO2: 97%, HR: 78 bpm , 149/65 after 1st set of 2 min) ; x 2 min a second time (SPO2: 97%, HR: 81 bpm, 147/67)   Seated:   -B heel raises 2 x 10 reps   -R/L LAQ 1 x 30  reps, 5 sec hold  -R/L ankle pumps with knee extended 1 x 30 reps  -B hip adduction ball squeeze 2 x 10 reps, 3 sec hold   -Seated marches alt R/L 2 x 20 reps (40 total on each leg)   -B scapular retraction 1 x 20 reps  -B shoulder circles BWD 1 x 20 reps  -B UE PNF chops/lifts 1 x 20 reps  -STS x 10 reps (96 SpO2%, 79 bpm)    OP EDUCATION:  Continue with current HEP.  Educated in correct exercise performance for optimal muscle recruitment.  PT educated patient and  to monitor patient's BP at home with HEP as needed.  Reviewed care of compression stockings and suggested purchasing another pair to allow for proper cleaning and drying.       Goals:  Active       PT Problem       Patient to demonstrate improved endurance as evidenced by ability to perform 10 minutes on sci fit        Start:  06/06/24    Expected End:  07/18/24            Patient will demonstrate ability to ambulate on even and uneven surfaces safely without device       Start:  06/06/24    Expected End:  07/18/24            Patient will demonstrate improved B LE strength to 4+/5 to allow for improved endurance and safety with mobility.        Start:  06/06/24    Expected End:  07/18/24            Patient will demonstrate independence and compliance with safe and appropriate HEP for pain reduction, ROM/flexibility, core and BLE strength and postural correction.        Start:  06/06/24    Expected End:  07/18/24            Patient will  demonstrate improved score on ABC by 10% to demonstrate improved subjective functional mobility and ability to perform daily activities.          Start:  06/06/24    Expected End:  07/18/24

## 2024-07-03 ENCOUNTER — TREATMENT (OUTPATIENT)
Dept: PHYSICAL THERAPY | Facility: CLINIC | Age: 84
End: 2024-07-03
Payer: MEDICARE

## 2024-07-03 DIAGNOSIS — G20.C PARKINSONISM, UNSPECIFIED PARKINSONISM TYPE (MULTI): ICD-10-CM

## 2024-07-03 DIAGNOSIS — R26.9 GAIT DISORDER: ICD-10-CM

## 2024-07-03 DIAGNOSIS — R29.898 WEAKNESS OF BOTH LOWER EXTREMITIES: Primary | ICD-10-CM

## 2024-07-03 PROCEDURE — 97110 THERAPEUTIC EXERCISES: CPT | Mod: GP

## 2024-07-03 NOTE — PROGRESS NOTES
"Physical Therapy    Physical Therapy Treatment    Patient Name: Lorie Ding  MRN: [id number]    Today's Date: 7/3/2024  Time Calculation  Start Time: 0933  Stop Time: 1012  Time Calculation (min): 39 min     PT Therapeutic Procedures Time Entry  Therapeutic Exercise Time Entry: 39    Assessment:   Patient appeared to tolerate session well, noting minimal fatigue and responding \"I'm fine,\" when asked how she was doing with exercises. Patient's vitals appeared to remain stable/WNL and patient denied any feelings of lightheadedness/dizziness during or at end of session. Patient with good effort throughout session. Patient given updated HEP and educated on safe completion of HEP, and to monitor BP as appropriate.     Plan:   Progress POC as able and tolerated by patient. Adjust plan as needed.      Current Problem  1. Weakness of both lower extremities        2. Parkinsonism, unspecified Parkinsonism type (Multi)  Follow Up In Physical Therapy      3. Gait disorder  Follow Up In Physical Therapy          General     General  Reason for Referral: G20.C (ICD-10-CM) - Parkinsonism, unspecified Parkinsonism type (Multi)  R26.9 (ICD-10-CM) - Gait disorder  Referred By: Dr. Alfred  Past Medical History Relevant to Rehab: HTN, CKD,DVT, PE, IVC filter, orthostatic hypotension  Family/Caregiver Present: Yes ( is [employer] [place] - very supportive)  General Comment: Onset date 5/10/24, Medicare no auth, Visit 4    Subjective    The patient notes no new changes or new complaints. No pain anywhere. Notes she \"I felt fine\" after last session. No recent falls. No changes in medical status. HEP is going well.  Patient notes she has XI hose on. Has been doing HEP at home. Notes they take a walk for 3/10 mile each day.     Pt's  present for duration of session.     Precautions  Precautions  Precautions Comment: Per note from Dr. Miranda patient has significant hypotension and has had prior episodes of syncope without symptoms - to be " "wearing XI hose and abdominal binder (note form Dr. Alfred on 5/9/24) : PT's recomendation *Gradual progression of exericse with monitoring of vitals during sessions*    Vital Signs   Start of session:   BP: 148/72; HR: 78 bpm   SPO2: 98%    After Sci fit/Midpoint of session:   BP: 144/69; HR: 81 bpm   SPO2: 98%     End of session:   BP: 127/76; HR: 83, SPO2: 98%   Taken a 2nd time after sitting for 3-4 min: 133/68; HR: 83 bpm   Patient noted no dizziness/lightheadedness at end of session.     Pain   0/10 pain at start of session and end of session. Patient left therapy in no distress.     Objective   Patient presents to therapy with her . Pt ambulates without assistive device.     Treatments:  Therapeutic Exercise (48320)-39 min   Seated on mat table:   -B heel raises 2 x 10 reps   -R/L LAQ 2 x 10 reps, 5 sec hold   (SPO2: 99%, HR: 81-82 bpm. Pt reported \"I feel fine\"  -B hip adduction ball squeeze 2 x 12 reps, 3 sec hold   -Sci fit, seat 19, L1 x 2 min then rest break (SPO2: 97%, HR: 91 and then down to 82 bpm in < 1 minute), then x 2 min (HR: 91-80 bpm as she rested, SPO2: 96-97%)   Seated on mat table:   -Seated scapular retraction 2 x 10 reps, 5 sec hold   -Seated marches alt R/L 2 x 20 reps (20 total on each leg); VC to maintain upright postural positioning   -R/L ankle pumps with knee extended 1 x 25 reps  -Seated hip abduction isometric against gait belt above knees x 10 reps, 5 sec hold \"felt fine\" per pt     Patient's vitals monitored throughout session. Please see objective.- billed with TE     OP EDUCATION:   Patient and  educated on purpose of PT and purpose of doing seated/supine exercises, as this is what provider recommended. Patient given updated HEP and educated on safe completion of HEP. Patient and  educated to monitor vitals as needed at home during exercises.     Access Code: [email or web address]  URL: [email or web address]  Date: 07/03/2024  Prepared by: [email or web address]" [place]    Program Notes  Complete all exercises in pain free range of motion. Hold any exercise from program that causes discomfort. Monitor BP as needed during exercises.   Pt educated to complete supine exercises on bed and not on floor.   Exercises  - Supine Bridge  - 1 x daily - 5 x weekly - 1-2 sets - 10 reps  - Sidelying Hip Abduction  - 1 x daily - 5 x weekly - 1-2 sets - 10 reps  - Supine Active Straight Leg Raise  - 1 x daily - 5 x weekly - 1-2 sets - 10 reps  - Seated Heel Raise  - 1 x daily - 5 x weekly - 2 sets - 10 reps  - Seated Long Arc Quad  - 1 x daily - 5 x weekly - 2 sets - 10 reps  - Seated Hip Adduction Squeeze with Ball  - 1 x daily - 7 x weekly - 2 sets - 10 reps  - Seated Scapular Retraction  - 1 x daily - 7 x weekly - 2 sets - 10 reps    Goals:  Active       PT Problem       Patient to demonstrate improved endurance as evidenced by ability to perform 10 minutes on sci fit        Start:  06/06/24    Expected End:  07/18/24            Patient will demonstrate ability to ambulate on even and uneven surfaces safely without device       Start:  06/06/24    Expected End:  07/18/24            Patient will demonstrate improved B LE strength to 4+/5 to allow for improved endurance and safety with mobility.        Start:  06/06/24    Expected End:  07/18/24            Patient will demonstrate independence and compliance with safe and appropriate HEP for pain reduction, ROM/flexibility, core and BLE strength and postural correction.        Start:  06/06/24    Expected End:  07/18/24            Patient will demonstrate improved score on ABC by 10% to demonstrate improved subjective functional mobility and ability to perform daily activities.          Start:  06/06/24    Expected End:  07/18/24

## 2024-07-03 NOTE — PROGRESS NOTES
Subjective   Patient ID: Lorie Ding is a 83 y.o. female who presents for Hospital Follow-up.    HPI     Review of Systems    Objective   There were no vitals taken for this visit.    Physical Exam    Assessment/Plan

## 2024-07-05 ENCOUNTER — APPOINTMENT (OUTPATIENT)
Dept: PRIMARY CARE | Facility: CLINIC | Age: 84
End: 2024-07-05
Payer: MEDICARE

## 2024-07-05 ENCOUNTER — APPOINTMENT (OUTPATIENT)
Dept: PHYSICAL THERAPY | Facility: CLINIC | Age: 84
End: 2024-07-05
Payer: MEDICARE

## 2024-07-05 VITALS
BODY MASS INDEX: 23.95 KG/M2 | DIASTOLIC BLOOD PRESSURE: 73 MMHG | HEIGHT: 66 IN | HEART RATE: 68 BPM | SYSTOLIC BLOOD PRESSURE: 129 MMHG | WEIGHT: 149 LBS | OXYGEN SATURATION: 96 %

## 2024-07-05 DIAGNOSIS — I26.09 PULMONARY EMBOLISM WITH ACUTE COR PULMONALE, UNSPECIFIED CHRONICITY, UNSPECIFIED PULMONARY EMBOLISM TYPE (MULTI): Primary | ICD-10-CM

## 2024-07-05 DIAGNOSIS — I10 BENIGN ESSENTIAL HYPERTENSION: ICD-10-CM

## 2024-07-05 DIAGNOSIS — I47.10 SVT (SUPRAVENTRICULAR TACHYCARDIA) (CMS-HCC): ICD-10-CM

## 2024-07-05 DIAGNOSIS — N18.32 STAGE 3B CHRONIC KIDNEY DISEASE (MULTI): ICD-10-CM

## 2024-07-05 DIAGNOSIS — I48.92 ATRIAL FLUTTER, UNSPECIFIED TYPE (MULTI): ICD-10-CM

## 2024-07-05 DIAGNOSIS — Z12.31 VISIT FOR SCREENING MAMMOGRAM: ICD-10-CM

## 2024-07-05 PROBLEM — D69.6 THROMBOCYTOPENIA (CMS-HCC): Status: RESOLVED | Noted: 2021-06-24 | Resolved: 2024-07-05

## 2024-07-05 PROCEDURE — 99214 OFFICE O/P EST MOD 30 MIN: CPT | Performed by: INTERNAL MEDICINE

## 2024-07-05 PROCEDURE — 1160F RVW MEDS BY RX/DR IN RCRD: CPT | Performed by: INTERNAL MEDICINE

## 2024-07-05 PROCEDURE — 1036F TOBACCO NON-USER: CPT | Performed by: INTERNAL MEDICINE

## 2024-07-05 PROCEDURE — 3078F DIAST BP <80 MM HG: CPT | Performed by: INTERNAL MEDICINE

## 2024-07-05 PROCEDURE — 1159F MED LIST DOCD IN RCRD: CPT | Performed by: INTERNAL MEDICINE

## 2024-07-05 PROCEDURE — 3074F SYST BP LT 130 MM HG: CPT | Performed by: INTERNAL MEDICINE

## 2024-07-05 RX ORDER — ERYTHROMYCIN 5 MG/G
1 OINTMENT OPHTHALMIC
COMMUNITY
Start: 2024-06-21

## 2024-07-05 NOTE — PROGRESS NOTES
Subjective   Patient ID: Lorie Ding is a 83 y.o. female who presents for Follow-up with her ; no concerns.  states she has orthostatic hypotension; has been seeing nephrology and neurology.     HPI     Review of Systems    Objective   There were no vitals taken for this visit.    Physical Exam    Assessment/Plan

## 2024-07-05 NOTE — PATIENT INSTRUCTIONS
Recurrent loss of consciousness, likely due to autonomic dysfunction.   Stay hydrated and wear compression hose  Continue with neurology    Recurrent PE- continue xarelto.     Call [phone] to schedule mammogram  Bone density due in 2028  Colonoscopy due in 2025    Get RSV vaccine and Tdap (tetanus with pertussis shot at the pharmacy    Chronic kidney disease,   Stay hydrated    Recheck in 1 year

## 2024-07-05 NOTE — PROGRESS NOTES
"Subjective   Patient ID: Lorie Ding is a 83 y.o. female who presents for Follow-up.    Had 3 syncopal episodes in [place]  In December 2023 ws in the ER  March of 2024 in [place] had last syncopal spell.   She is taking protein supplements  She was walking over 1 mile [place], but not as much  She started PT by Dr Garcia    Her bp was 118/72, then bp went up after exercise.            Review of Systems    Objective   /73   Pulse 68   Ht 1.664 m (5' 5.5\")   Wt 67.6 kg (149 lb)   SpO2 96%   BMI 24.42 kg/m²     Physical Exam  Constitutional:       Appearance: Normal appearance.      Comments: Masked face  Left lower lid ectropion   Neck:      Vascular: No carotid bruit.   Cardiovascular:      Rate and Rhythm: Normal rate and regular rhythm.      Heart sounds: No murmur heard.  Pulmonary:      Effort: Pulmonary effort is normal.      Breath sounds: Normal breath sounds.   Lymphadenopathy:      Cervical: No cervical adenopathy.   Skin:     Coloration: Skin is not jaundiced or pale.   Neurological:      Mental Status: She is alert.         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 Per neurology  Has implantable loop recorder due to SVT on prior zio and to see if LOC is from cardiac origin     Patient Instructions   Recurrent loss of consciousness, likely due to autonomic dysfunction.   Stay hydrated and wear compression hose  Continue with neurology    Recurrent PE- continue xarelto.     Call [phone] to schedule mammogram  Bone density due in 2028  Colonoscopy due in 2025    Get RSV vaccine and Tdap (tetanus with pertussis shot at the pharmacy    Chronic kidney disease,   Stay hydrated    Recheck in 1 year   " No

## 2024-07-10 ENCOUNTER — TREATMENT (OUTPATIENT)
Dept: PHYSICAL THERAPY | Facility: CLINIC | Age: 84
End: 2024-07-10
Payer: MEDICARE

## 2024-07-10 DIAGNOSIS — G20.C PARKINSONISM, UNSPECIFIED PARKINSONISM TYPE (MULTI): ICD-10-CM

## 2024-07-10 DIAGNOSIS — R26.9 GAIT DISORDER: ICD-10-CM

## 2024-07-10 PROCEDURE — 97530 THERAPEUTIC ACTIVITIES: CPT | Mod: GP

## 2024-07-10 NOTE — PROGRESS NOTES
Physical Therapy    Physical Therapy Treatment/Re-Check/Discharge Summary    Patient Name: Lorie Ding  MRN: [id number]    Today's Date: 7/10/2024  Time Calculation  Start Time: 0920  Stop Time: 0950  Time Calculation (min): 30 min     PT Therapeutic Procedures Time Entry  Therapeutic Activity Time Entry: 30    Assessment:  PT Assessment  PT Assessment Results: Decreased cognition  The patient is an 84 y/o female who has attended 5 sessions of skilled physical therapy to address reports of frequent falls associated with orthostatic hypotension. The patient reports no recent falls. The patient subjectively reports compliance with HEP. The patient demonstrated improvements with regard to improved LE strength per MMT and improved times required to complete TUG and 5xSTS. The patient and her  were extensively educated that per neurologist, Dr. Miranda's note from 5/31/24, purpose of PT referral was to teach patient seated exercises she could safely complete at home.  Patient and  note that they feel comfortable continuing to complete HEP IND at home. Therefore, at this time, the patient is appropriate to be discharged from skilled PT services to seated/supine focused HEP. Patient to follow up with neurologist later this month per EMR. Patient and  in agreement with this POC.     Plan:  OP PT Plan  PT Plan: No Additional PT interventions required at this time (Patient discharged from skilled PT services to comprehensive HEP and is to follow up with neurologist soon.)  Onset Date: 05/10/24  Number of Treatments Authorized: MN  Rehab Potential: Good  Plan of Care Agreement: Patient, Other (comment) (Spouse)    Current Problem  1. Parkinsonism, unspecified Parkinsonism type (Multi)  Follow Up In Physical Therapy      2. Gait disorder  Follow Up In Physical Therapy          General     General  Reason for Referral: G20.C (ICD-10-CM) - Parkinsonism, unspecified Parkinsonism type (Multi)  R26.9 (ICD-10-CM) -  "Gait disorder  Referred By: Dr. Alfred  Past Medical History Relevant to Rehab: HTN, CKD,DVT, PE, IVC filter, orthostatic hypotension  Family/Caregiver Present: Yes ( is [place] - very supportive)  General Comment: Onset date 5/10/24, Medicare no auth, Visit 5    Subjective    The patient notes she felt \"good\" after last session. Notes no new changes or new complaints, no episodes of passing out/fainting. Saw Dr. Márquez last week and she recommended that she continue to follow up with neurologist. Feels comfortable continuing exercises at home. No recent falls. Is walking about 0.4 miles at home with .     Precautions  Precautions  Precautions Comment: Per note from Dr. Miranda patient has significant hypotension and has had prior episodes of syncope without symptoms - to be wearing XI hose and abdominal binder (note form Dr. Alfred on 5/9/24) : PT's recomendation *Gradual progression of exericse with monitoring of vitals during sessions*    Vital Signs   BP at start of session in seated: 157/75; HR: 68 bpm, SPO2: 98%   BP after recheck in seated (left arm): 166/74; HR: 68 bpm  BP taken after resting quietly in seated for 3-4 minutes: 157/68; HR: 67 bpm, SPO2: 99%  Patient noted no chest pain/HA/SOB or lightheadedness/dizziness at end of session. Patient left therapy in no distress.   Patient educated to monitor her BP at home and contact neurologist if it becomes low/high. Pt and  reported understanding.     Pain   No pain anywhere at start or end of session.     Objective   PT messaged Dr. Juan Alfred (neurologist) via [employer] to update him on patient's progress in therapy as well as her elevated BP levels observed this date.   This PT also spoke with evaluating therapist, Cesia Best PT who noted that per Dr. Miranda's note from 5/31, primary purpose of PT referral was to address seated exercises that patient could safely complete at home.     Cognition:  Overall Cognitive Status:  (flat affect)   "   Observation:  Observation Comment: L eyelid droop. No tremors at rest or noted during MMT this date.   Balance: Static Sitting Balance  Static Sitting-Comment/Number of Minutes: Good  Static Standing Balance  Static Standing-Comment/Number of Minutes: Good    Transfer/Mobility Assessment:    Patient presents to therapy ambulating without use of assistive device. Patient with decreased trunk rotation and B arm swing. Slightly forward flexed posturing. Decreased B step clearance. Normal gait speed.   Pt completed sit <> supine IND to mat table.      Extremities Assessment: RLE   RLE : Exceptions to WFL  Strength RLE  R hip extension: 3 (bridge)   R Hip Flexion: 5/5  R Hip ABduction: 4+/5 (s/l)   R Knee Flexion: 4+/5  R Knee Extension: 4+/5  R Ankle Dorsiflexion: 4/5  R Ankle Plantar Flexion: 4/5 and LLE   LLE : Exceptions to WFL  Strength LLE  LLE Overall Strength:  L hip extension: 3 (bridge)   L Hip Flexion: 5/5  L Hip ABduction: 4+/5 (s/l)   L Knee Flexion: 4+/5  L Knee Extension: 4+/5  L Ankle Dorsiflexion: 4+/5  L Ankle Plantar Flexion: 4/5    Flexibility:   Good B hamstring flexibility    Outcome Measures:  Other Measures  Activities - Specific Balance Confidence Scale: 83.75% confidence  Other Measures  5x Sit to Stand: without UE support 9.87 seconds  Other Outcome Measures: TUG without device: 7.51 seconds     Treatments:  Therapeutic Activity (11968)- 30 minutes   -Re-check completed this date. Please see goals and objective for details. Patient educated on outcomes of re-check and POC going forward.    -Patient educated on continuing to complete HEP within pain free ROM and to hold any part of HEP that causes increased pain. Patient reported understanding. Patient declined need to review HEP and noted she did not need additional copies  -Vitals taken during session  -Patient and  educated on not rolling XI hose (pt had B XI hose rolled) in order to avoid uneven compression. PT assisted patient to  properly don XI hose without rolling  -Patient and  educated on continuing to monitor vitals at home.     OP EDUCATION:  Outpatient Education  Individual(s) Educated: Patient, Spouse  Education Provided: POC, Home Exercise Program, Home Safety, Fall Risk  Risk and Benefits Discussed with Patient/Caregiver/Other: yes  Patient/Caregiver Demonstrated Understanding: yes  Plan of Care Discussed and Agreed Upon: yes  Patient Response to Education: Patient/Caregiver Verbalized Understanding of InformationSee above under TA.     Goals:  Resolved       PT Problem       Patient to demonstrate improved endurance as evidenced by ability to perform 10 minutes on sci fit  (Progressing)       Start:  06/06/24    Expected End:  07/18/24    Resolved:  07/10/24         Patient will demonstrate ability to ambulate on even and uneven surfaces safely without device (Progressing)       Start:  06/06/24    Expected End:  07/18/24    Resolved:  07/10/24         Patient will demonstrate improved B LE strength to 4+/5 to allow for improved endurance and safety with mobility.  (Progressing)       Start:  06/06/24    Expected End:  07/18/24    Resolved:  07/10/24         Patient will demonstrate independence and compliance with safe and appropriate HEP for pain reduction, ROM/flexibility, core and BLE strength and postural correction.  (Met)       Start:  06/06/24    Expected End:  07/18/24    Resolved:  07/10/24         Patient will demonstrate improved score on ABC by 10% to demonstrate improved subjective functional mobility and ability to perform daily activities.    (Progressing)       Start:  06/06/24    Expected End:  07/18/24    Resolved:  07/10/24

## 2024-07-11 ENCOUNTER — HOSPITAL ENCOUNTER (OUTPATIENT)
Dept: CARDIOLOGY | Facility: CLINIC | Age: 84
Discharge: HOME | End: 2024-07-11
Payer: MEDICARE

## 2024-07-11 DIAGNOSIS — Z45.09 ENCOUNTER FOR LOOP RECORDER CHECK: ICD-10-CM

## 2024-07-11 DIAGNOSIS — R55 SYNCOPE AND COLLAPSE: ICD-10-CM

## 2024-07-11 PROCEDURE — 93298 REM INTERROG DEV EVAL SCRMS: CPT

## 2024-07-12 ENCOUNTER — APPOINTMENT (OUTPATIENT)
Dept: PHYSICAL THERAPY | Facility: CLINIC | Age: 84
End: 2024-07-12
Payer: MEDICARE

## 2024-07-17 ENCOUNTER — APPOINTMENT (OUTPATIENT)
Dept: PHYSICAL THERAPY | Facility: CLINIC | Age: 84
End: 2024-07-17
Payer: MEDICARE

## 2024-07-19 ENCOUNTER — APPOINTMENT (OUTPATIENT)
Dept: PHYSICAL THERAPY | Facility: CLINIC | Age: 84
End: 2024-07-19
Payer: MEDICARE

## 2024-07-22 ENCOUNTER — APPOINTMENT (OUTPATIENT)
Dept: NEUROLOGY | Facility: CLINIC | Age: 84
End: 2024-07-22
Payer: MEDICARE

## 2024-07-22 ENCOUNTER — HOSPITAL ENCOUNTER (OUTPATIENT)
Dept: CARDIOLOGY | Facility: CLINIC | Age: 84
Discharge: HOME | End: 2024-07-22
Payer: MEDICARE

## 2024-07-22 DIAGNOSIS — R55 SYNCOPE AND COLLAPSE: ICD-10-CM

## 2024-07-22 DIAGNOSIS — G62.9 SMALL FIBER NEUROPATHY: Primary | ICD-10-CM

## 2024-07-22 DIAGNOSIS — Z45.09 ENCOUNTER FOR LOOP RECORDER CHECK: ICD-10-CM

## 2024-07-22 PROCEDURE — 99215 OFFICE O/P EST HI 40 MIN: CPT | Performed by: SPECIALIST

## 2024-07-22 NOTE — PROGRESS NOTES
Date of Service: 7/22/2024  Patient: Lorie Ding  MRN: [id number]  Referring Provider: No ref. provider found  Primary Care Physician: Shelley Márquez DO     History of Present Illness:    Ms. Ding is a 83 y.o. left handed female  with a past medical history of  hypertension, chronic kidney disease stage 3, DVT and pulmonary embolism (on Xarelto),who  has been suffering  for the last 2 years history of mild cognitive decline, personality changes, apathy and decreased emotional response, recurrent syncope and orthostasis. She had form fainting episodes that have been increasing in frequency, with about 10 to 12 episodes since the beginning of 2023. These episodes are often triggered by standing up or during physical exertion, leading to advice against walking alone due to fainting risks.    Additionally, Lorie describes significant personality changes following two falls in 2021,  that resulted in head injuries. She notes a diminished emotional response and a decreased interest in social interactions.    Her family has expressed concern over these changes, observing that Lorie has become less engaged in conversations and has withdrawn from social activities, now primarily interacting with family members.      Subjective:  Since seen last, Lorie, reports a stable condition since her last visit on May 9th, 2024 with no new symptoms. She has been diligent in monitoring her blood pressure and heart rate, observing values within the range of 103/59 to 118/80 for systolic/diastolic pressure and a heart rate fluctuating between 73 and 111 beats per minute. Lorie has not experienced any adverse symptoms such as lightheadedness or dizziness associated with these measurements. She has been using compression stockings and an abdominal binder during daytime hours, opting not to use them at night, and reports no fainting spells since her last appointment. Furthermore, Lorie has not required the use of ice water to mitigate any  episodes of dizziness.    Medical Management:  Lorie has been adhering to a regimen of physical therapy at home, following the guidance of her physical therapist who has determined that in-person sessions are not necessary at this time. Her family has expressed interest in enhancing her physical therapy routine by adding chair yoga, a topic they plan to discuss with the physical therapist.  Her Losartan dose was decreased to 25 mg po daily with an improvement of her symptoms.     Lifestyle:  Lorie maintains adequate hydration, consuming approximately 2-3 liters (120 ounces) of fluids daily, which supports her overall health and well-being.    Current Outpatient Medications on File Prior to Visit   Medication Sig Dispense Refill    cholecalciferol (Vitamin D-3) 50 mcg (2,000 unit) capsule Take 1 capsule (50 mcg) by mouth once daily.      cyanocobalamin, vitamin B-12, (Vitamin B-12) 1,000 mcg tablet extended release Take 1 tablet (1,000 mcg) by mouth once daily. as directed      erythromycin (Romycin) 5 mg/gram (0.5 %) ophthalmic ointment Apply 1 Application to affected eye(s).      flecainide (Tambocor) 50 mg tablet Take 1 tablet (50 mg) by mouth 2 times a day. 180 tablet 1    losartan (Cozaar) 25 mg tablet Take 1 tablet (25 mg) by mouth once daily. 90 tablet 3    Xarelto 20 mg tablet TAKE 1 TABLET (20 MG) BY MOUTH ONCE DAILY. TAKE WITH FOOD. 90 tablet 1     No current facility-administered medications on file prior to visit.     I have personally reviewed the patient's lab work and results for the last year:  Lab on 06/17/2024   Component Date Value Ref Range Status    WBC 06/17/2024 6.9  4.4 - 11.3 x10*3/uL Final    nRBC 06/17/2024 0.0  0.0 - 0.0 /100 WBCs Final    RBC 06/17/2024 4.26  4.00 - 5.20 x10*6/uL Final    Hemoglobin 06/17/2024 14.5  12.0 - 16.0 g/dL Final    Hematocrit 06/17/2024 43.7  36.0 - 46.0 % Final    MCV 06/17/2024 103 (H)  80 - 100 fL Final    MCH 06/17/2024 34.0  26.0 - 34.0 pg Final    MCHC  06/17/2024 33.2  32.0 - 36.0 g/dL Final    RDW 06/17/2024 12.9  11.5 - 14.5 % Final    Platelets 06/17/2024 247  150 - 450 x10*3/uL Final    Ferritin 06/17/2024 91  8 - 150 ng/mL Final    Iron 06/17/2024 143  35 - 150 ug/dL Final    UIBC 06/17/2024 232  110 - 370 ug/dL Final    TIBC 06/17/2024 375  240 - 445 ug/dL Final    % Saturation 06/17/2024 38  25 - 45 % Final    Magnesium 06/17/2024 2.22  1.60 - 2.40 mg/dL Final    Parathyroid Hormone, Intact 06/17/2024 69.5  18.5 - 88.0 pg/mL Final    Glucose 06/17/2024 85  74 - 99 mg/dL Final    Sodium 06/17/2024 140  136 - 145 mmol/L Final    Potassium 06/17/2024 5.0  3.5 - 5.3 mmol/L Final    Chloride 06/17/2024 104  98 - 107 mmol/L Final    Bicarbonate 06/17/2024 26  21 - 32 mmol/L Final    Anion Gap 06/17/2024 15  10 - 20 mmol/L Final    Urea Nitrogen 06/17/2024 33 (H)  6 - 23 mg/dL Final    Creatinine 06/17/2024 1.61 (H)  0.50 - 1.05 mg/dL Final    eGFR 06/17/2024 32 (L)  >60 mL/min/1.73m*2 Final    Calculations of estimated GFR are performed using the 2021 CKD-EPI Study Refit equation without the race variable for the IDMS-Traceable creatinine methods.  https://jasn.asnjournals.org/content/early/2021/09/22/ASN.1747825424    Calcium 06/17/2024 9.9  8.6 - 10.6 mg/dL Final    Phosphorus 06/17/2024 3.5  2.5 - 4.9 mg/dL Final    The performance characteristics of phosphorus testing in heparinized plasma have been validated by the individual  laboratory site where testing is performed. Testing on heparinized plasma is not approved by the FDA; however, such approval is not necessary.    Albumin 06/17/2024 3.9  3.4 - 5.0 g/dL Final    Vitamin D, 25-Hydroxy, Total 06/17/2024 60  30 - 100 ng/mL Final   Hospital Outpatient Visit on 06/10/2024   Component Date Value Ref Range Status    AV pk siomara 06/10/2024 1.61  m/s Final    AV mn grad 06/10/2024 6.0  mmHg Final    LVOT diam 06/10/2024 2.00  cm Final    LV Biplane EF 06/10/2024 63  % Final    MV avg E/e' ratio 06/10/2024 8.04    Final    MV E/A ratio 06/10/2024 0.92   Final    LA vol index A/L 06/10/2024 15.7  ml/m2 Final    Tricuspid annular plane systolic e* 06/10/2024 1.8  cm Final    RV free wall pk S' 06/10/2024 10.90  cm/s Final    LVIDd 06/10/2024 3.92  cm Final    RVSP 06/10/2024 48.4  mmHg Final    Aortic Valve Area by Continuity of* 06/10/2024 1.80  cm2 Final    Aortic Valve Area by Continuity of* 06/10/2024 1.91  cm2 Final    AV pk grad 06/10/2024 10.4  mmHg Final    LV A4C EF 06/10/2024 63.7   Final   Office Visit on 06/03/2024   Component Date Value Ref Range Status    Ventricular Rate 06/03/2024 78  BPM Final    Atrial Rate 06/03/2024 78  BPM Final    CA Interval 06/03/2024 186  ms Final    QRS Duration 06/03/2024 90  ms Final    QT Interval 06/03/2024 380  ms Final    QTC Calculation(Bazett) 06/03/2024 433  ms Final    P Axis 06/03/2024 72  degrees Final    R Axis 06/03/2024 74  degrees Final    T [place] 06/03/2024 64  degrees Final    QRS Count 06/03/2024 13  beats Final    Q Onset 06/03/2024 218  ms Final    P Onset 06/03/2024 125  ms Final    P Offset 06/03/2024 185  ms Final    T Offset 06/03/2024 408  ms Final    QTC Fredericia 06/03/2024 414  ms Final   Lab on 05/17/2024   Component Date Value Ref Range Status    Metanephrines, Urine Interpretation 05/17/2024 See Note   Final    Comment: TEST INFORMATION: Metanephrines Fractionated, Urine    Smaller increases in metanephrine and/or normetanephrine   concentrations (less than two times the upper reference limit)   usually are the result of physiological stimuli, drugs, or   improper specimen collection. Essential hypertension is often   associated with slight elevations (metanephrine less than 400 ug/d   and normetanephrine less than 900 ug/d). Elevated concentrations   may be due to intense physical activity, life-threatening illness,   and drug interferences.     Significant elevation of one or both metanephrines (three or more   times the upper reference limit) is  associated with an increased   probability of a neuroendocrine tumor.    Access complete set of age- and/or gender-specific reference   intervals for this test in the [facility] Test Directory   ([employer]).    This test was developed and its performance characteristics   determined by [facility]. It has not been cleared or   approved by the                            US Food and Drug Administration. This test was   performed in a CLIA certified laboratory and is intended for   clinical purposes.    Metanephrines, Urine 05/17/2024 65  ug/L Final    Metanephrines, 24H Urine 05/17/2024 39  36 - 229 ug/d Final    Metanephrine, Urine - ratio to CRT 05/17/2024 55  0 - 300 ug/g CRT Final    Normetanephrine, Urine 05/17/2024 323  ug/L Final    Normetanephrine, 24H Urine 05/17/2024 194  95 - 650 ug/d Final    Normetanephrine, Urine - ratio to * 05/17/2024 271  0 - 400 ug/g CRT Final    Creatinine, Urine - per volume 05/17/2024 119  mg/dL Final    Creatinine, 24H Ur 05/17/2024 714  400 - 1300 mg/d Final    Performed By: [facility]  [address] [place] [id number]  : Trevor Canchola MD, PhD  [facility] Number: [id number]    Total Volume 05/17/2024 600  mL Final    Collection Interval, Ur 05/17/2024 24  hr Final      Per 24h calculations are provided to aid interpretation for   collections with a duration of 24 hours and an average daily urine   volume. For specimens with notable deviations in collection time   or volume, ratios of analytes to a corresponding urine creatinine   concentration may assist in result interpretation.   Lab on 05/13/2024   Component Date Value Ref Range Status    Normetanephrine 05/13/2024 0.71  0.00 - 0.89 nmol/L Final    Metanephrine 05/13/2024 0.13  0.00 - 0.49 nmol/L Final    Metanephrines Interpretation 05/13/2024 See Note   Final    Comment: INTERPRETIVE INFORMATION: Metanephrines, Plasma (Free)    This test is useful in the detection of  pheochromocytoma, a rare   neuroendocrine tumor. The majority of patients with   pheochromocytoma have a plasma normetanephrine concentration in   excess of 2.2 nmol/L and/or a metanephrine concentration in excess   of 1.1 nmol/L. Increased concentrations of these analytes serve as   confirmation for diagnosis. Patients with essential hypertension   and plasma concentrations of normetanephrine below 0.9 nmol/L and   a metanephrine concentration below 0.5 nmol/L, can be excluded   from further testing. If clinical suspicion remains, repeat   testing or testing for metanephrines in a 24-hr. urine specimen   should be considered.    This test was developed and its performance characteristics   determined by [facility]. It has not been cleared or   approved by the [place] Food and Drug Administration. This test was   performed in a CLIA certified laboratory and is intended for   clinical                            purposes.  Performed By: [facility]  [address] [place] [address]  : Trevor Canchola MD, PhD  CLIA Number: [id number]    Vitamin E (Alpha-Tocopherol) 05/13/2024 10.6  5.5 - 18.0 mg/L Final      This test was developed and its performance characteristics   determined by [facility]. It has not been cleared or   approved by the US Food and Drug Administration. This test was   performed in a CLIA certified laboratory and is intended for   clinical purposes.    Vitamin E (Gamma-Tocopherol) 05/13/2024 1.1  0.0 - 6.0 mg/L Final    Performed By: [facility]  [address] [place] [address]  : Trevor Canchola MD, PhD  CLIA Number: [id number]    Vitamin B6 05/13/2024 54.7  20.0 - 125.0 nmol/L Final    INTERPRETIVE INFORMATION: Vitamin B6 (Pyridoxal 5-Phosphate)    Pyridoxal 5'-phosphate measured in a specimen collected following   an 8-hour or overnight fast accurately indicates vitamin B6   nutritional status. Non-fasting  specimen concentration reflects   recent vitamin intake.    This test was developed and its performance characteristics   determined by [facility]. It has not been cleared or   approved by the US Food and Drug Administration. This test was   performed in a CLIA certified laboratory and is intended for   clinical purposes.  Performed By: [facility]  [address] [place] [address]  : Trevor Canchola MD, PhD  CLIA Number: [id number]    Triiodothyronine 05/13/2024 80  60 - 200 ng/dL Final    Sedimentation Rate 05/13/2024 7  0 - 30 mm/h Final    Rheumatoid Factor 05/13/2024 <10  0 - 15 IU/mL Final    Angio Converting Enzyme 05/13/2024 56  16 - 85 U/L Final    Performed By: [facility]  [address] [place] [address]  : Trevor Canchola MD, PhD  CLIA Number: [id number]    Parietal Cell Ab 05/13/2024 1.9  0.0 - 24.9 Units Final      In the context of vitamin B12 deficiency, the presence of gastric   parietal cell antibodies (PCA) and/or intrinsic factor antibodies   in association with macrocytic anemia is considered diagnostic for   pernicious anemia (PA). However, the presence of gastric PCAs   alone is not specific for PA. Gastric PCAs may occur with   increased frequency in unaffected family members, a small   percentage of healthy individuals, and patients with other   autoimmune diseases, such as autoimmune thyroiditis.  INTERPRETIVE INFORMATION: Gastric Parietal Cell Antibody, IgG    0.0-20.0 Units: Negative  20.1-24.9 Units: Equivocal  25.0 Units or greater: Positive  Performed By: [facility]  [address] [place] [address]  : Trevor Canchola MD, PhD  CLIA Number: [id number]    Interpretive Comments 05/13/2024 SEE COMMENTS   Final    A negative basic paraneoplastic evaluation result does not   rule out all clinically relevant antibodies. If indicated,   a comprehensive neurological  phenotype-specific   autoimmune/paraneoplastic evaluation (e.g. encephalopathy,   movement disorders, myelopathy, or axonal neuropathy)   should be considered   [email or web address]  on/.  These evaluations include screening cell-based assays   optimized for detection of recently discovered antibodies.    IFA Notes 05/13/2024 None.   Final    Amphiphysin Ab, S 05/13/2024 Negative  Negative Final       -------------------ADDITIONAL INFORMATION-------------------  This test was developed and its performance characteristics   determined by [facility] in a manner consistent with CLIA   requirements. This test has not been cleared or approved by   the U.S. Food and Drug Administration.    AGNA-1, S 05/13/2024 Negative  Negative Final       -------------------ADDITIONAL INFORMATION-------------------  This test was developed and its performance characteristics   determined by [facility] in a manner consistent with CLIA   requirements. This test has not been cleared or approved by   the U.S. Food and Drug Administration.    SUZETTE-1, S 05/13/2024 Negative  Negative Final       -------------------ADDITIONAL INFORMATION-------------------  This test was developed and its performance characteristics   determined by [facility] in a manner consistent with CLIA   requirements. This test has not been cleared or approved by   the U.S. Food and Drug Administration.    SUZETTE-2, S 05/13/2024 Negative  Negative Final       -------------------ADDITIONAL INFORMATION-------------------  This test was developed and its performance characteristics   determined by [facility] in a manner consistent with CLIA   requirements. This test has not been cleared or approved by   the U.S. Food and Drug Administration.    SUZETTE-3, S 05/13/2024 Negative  Negative Final       -------------------ADDITIONAL INFORMATION-------------------  This test was developed and its performance characteristics   determined by  [facility] in a manner consistent with CLIA   requirements. This test has not been cleared or approved by   the U.S. Food and Drug Administration.    CRMP-5-IgG, S 05/13/2024 Negative  Negative Final       -------------------ADDITIONAL INFORMATION-------------------  This test was developed and its performance characteristics   determined by [facility] in a manner consistent with CLIA   requirements. This test has not been cleared or approved by   the U.S. Food and Drug Administration.    Neuronal (V-G) K+ Channel Ab, S 05/13/2024 0.00  <=0.02 nmol/L Final       -------------------ADDITIONAL INFORMATION-------------------  This test was developed and its performance characteristics   determined by [facility] in a manner consistent with CLIA   requirements. This test has not been cleared or approved by   the U.S. Food and Drug Administration.    P/Q-Type Calcium Channel Ab 05/13/2024 0.00  <=0.02 nmol/L Final       -------------------ADDITIONAL INFORMATION-------------------  This test was developed and its performance characteristics   determined by [facility] in a manner consistent with CLIA   requirements. This test has not been cleared or approved by   the U.S. Food and Drug Administration.    PCA-1, S 05/13/2024 Negative  Negative Final       -------------------ADDITIONAL INFORMATION-------------------  This test was developed and its performance characteristics   determined by [facility] in a manner consistent with CLIA   requirements. This test has not been cleared or approved by   the U.S. Food and Drug Administration.    PCA-2, S 05/13/2024 Negative  Negative Final       -------------------ADDITIONAL INFORMATION-------------------  This test was developed and its performance characteristics   determined by [facility] in a manner consistent with CLIA   requirements. This test has not been cleared or approved by   the U.S. Food and Drug Administration.    PCA-Tr, S 05/13/2024 Negative  Negative Final        -------------------ADDITIONAL INFORMATION-------------------  This test was developed and its performance characteristics   determined by [facility] in a manner consistent with CLIA   requirements. This test has not been cleared or approved by   the U.S. Food and Drug Administration.     Test Performed by:  [place] [address]  : Chris Mckeon M.D. Ph.D.; CLIA# [id number]    Methylmalonic Acid, S 05/13/2024 0.12  0.00 - 0.40 umol/L Final    INTERPRETIVE INFORMATION: MMA Serum/Plasma,                             Vitamin B12 Status    This test was developed and its performance characteristics   determined by [facility]. It has not been cleared or   approved by the US Food and Drug Administration. This test was   performed in a CLIA certified laboratory and is intended for   clinical purposes.  Performed By: [facility]  [address] [place] [id number]  : Trevor Canchola MD, PhD  CLIA Number: [id number]    WBC 05/13/2024 6.3  4.4 - 11.3 x10*3/uL Final    nRBC 05/13/2024 0.0  0.0 - 0.0 /100 WBCs Final    RBC 05/13/2024 3.93 (L)  4.00 - 5.20 x10*6/uL Final    Hemoglobin 05/13/2024 14.3  12.0 - 16.0 g/dL Final    Hematocrit 05/13/2024 41.0  36.0 - 46.0 % Final    MCV 05/13/2024 104 (H)  80 - 100 fL Final    MCH 05/13/2024 36.4 (H)  26.0 - 34.0 pg Final    MCHC 05/13/2024 34.9  32.0 - 36.0 g/dL Final    RDW 05/13/2024 13.2  11.5 - 14.5 % Final    Platelets 05/13/2024 244  150 - 450 x10*3/uL Final    Neutrophils % 05/13/2024 57.1  40.0 - 80.0 % Final    Immature Granulocytes %, Automated 05/13/2024 0.6  0.0 - 0.9 % Final    Immature Granulocyte Count (IG) includes promyelocytes, myelocytes and metamyelocytes but does not include bands. Percent differential counts (%) should be interpreted in the context of the absolute cell counts (cells/UL).    Lymphocytes % 05/13/2024 28.6  13.0 - 44.0 %  Final    Monocytes % 05/13/2024 11.3  2.0 - 10.0 % Final    Eosinophils % 05/13/2024 1.4  0.0 - 6.0 % Final    Basophils % 05/13/2024 1.0  0.0 - 2.0 % Final    Neutrophils Absolute 05/13/2024 3.59  1.60 - 5.50 x10*3/uL Final    Percent differential counts (%) should be interpreted in the context of the absolute cell counts (cells/uL).    Immature Granulocytes Absolute, Au* 05/13/2024 0.04  0.00 - 0.50 x10*3/uL Final    Lymphocytes Absolute 05/13/2024 1.80  0.80 - 3.00 x10*3/uL Final    Monocytes Absolute 05/13/2024 0.71  0.05 - 0.80 x10*3/uL Final    Eosinophils Absolute 05/13/2024 0.09  0.00 - 0.40 x10*3/uL Final    Basophils Absolute 05/13/2024 0.06  0.00 - 0.10 x10*3/uL Final    Epinephrine 05/13/2024 <15  0 - 62 pg/mL Final    Norepinephrine 05/13/2024 644  0 - 874 pg/mL Final    Dopamine 05/13/2024 <30  0 - 48 pg/mL Final    Glucose 05/13/2024 105 (H)  74 - 99 mg/dL Final    Sodium 05/13/2024 139  136 - 145 mmol/L Final    Potassium 05/13/2024 4.5  3.5 - 5.3 mmol/L Final    Chloride 05/13/2024 105  98 - 107 mmol/L Final    Bicarbonate 05/13/2024 26  21 - 32 mmol/L Final    Anion Gap 05/13/2024 13  10 - 20 mmol/L Final    Urea Nitrogen 05/13/2024 32 (H)  6 - 23 mg/dL Final    Creatinine 05/13/2024 1.57 (H)  0.50 - 1.05 mg/dL Final    eGFR 05/13/2024 33 (L)  >60 mL/min/1.73m*2 Final    Calculations of estimated GFR are performed using the 2021 CKD-EPI Study Refit equation without the race variable for the IDMS-Traceable creatinine methods.  https://jasn.asnjournals.org/content/early/2021/09/22/ASN.7885363584    Calcium 05/13/2024 9.9  8.6 - 10.6 mg/dL Final    Thyroid Peroxidase (TPO) Antibody 05/13/2024 52  <=60 IU/mL Final    Thyroglobulin 05/13/2024 26.4  1.3 - 31.8 ng/mL Final    INTERPRETIVE INFORMATION: Thyroglobulin, Serum or Plasma    Specimens negative for thyroglobulin antibodies (TgAb) are tested   for thyroglobulin (Tg) by chemiluminescent immunoassay (JAZMYN) using   the [employer] Access DxI  method. Specimens with TgAb results   above the upper reference limit are tested for Tg by   high-performance liquid chromatography-tandem mass spectrometry   (LC-MS/MS). Results obtained with different test methods or kits   cannot be used interchangeably. Tg results, regardless of   concentration, should not be interpreted as absolute evidence for   the presence or absence of papillary or follicular thyroid cancer.   Tg testing is not recommended for use as a screening procedure to   detect the presence of thyroid cancer in the general population.    Thyroglobulin LC-MS/MS 05/13/2024 Not Applicable  1.3 - 31.8 ng/mL Final    INTERPRETIVE INFORMATION: Thyroglobulin by LC-MS/MS, Serum/Plasma    Lower limit of detection for Thyroglobulin by LC-MS/MS is 0.5   ng/mL.    This test was developed and its performance characteristics   determined by [facility]. It has not been cleared or   approved by the US Food and Drug Administration. This test was   performed in a CLIA certified laboratory and is intended for   clinical purposes.  Performed By: [facility]  [address] [place] [address]  : Trevor Canchola MD, PhD  CLIA Number: [id number]    Anti-Thyroglobulin AB 05/13/2024 <0.9  0.0 - 4.0 IU/mL Final    INTERPRETIVE INFORMATION: Thyroglobulin Antibody                                    A value of 4.0 IU/mL or less indicates a negative result for   thyroglobulin antibodies.    The Thyroglobulin Antibody assay is being performed using the   [employer] Access DxI method.    Total Protein 05/13/2024 6.6  6.4 - 8.2 g/dL Final    Albumin 05/13/2024 3.9  3.4 - 5.0 g/dL Final    Alpha 1 Globulin 05/13/2024 0.3  0.2 - 0.6 g/dL Final    Alpha 2 Globulin 05/13/2024 0.6  0.4 - 1.1 g/dL Final    Beta Globulin 05/13/2024 0.9  0.5 - 1.2 g/dL Final    Gamma 05/13/2024 0.9  0.5 - 1.4 g/dL Final    Protein Electrophoresis Comment 05/13/2024 Normal.   Final    Path Review - Serum  Protein Electr* 05/13/2024 Reviewed and approved by [facility] ROCA on 5/15/24 at 8:10 AM.      Final    CATIA 05/13/2024 Negative  Negative Final    The Antinuclear Antibody (CATIA) test was performed using  indirect immunofluorescence assay with HEp-2 cells slide.    Anti-SM 05/13/2024 <0.2  <1.0 AI Final    < 1.0 = NEGATIVE  >=1.0 = POSITIVE    Anti-RNP 05/13/2024 <0.2  <1.0 AI Final    < 1.0 = NEGATIVE  >=1.0 = POSITIVE    Anti-SM/RNP 05/13/2024 <0.2  <1.0 AI Final    < 1.0 = NEGATIVE  >=1.0 = POSITIVE    Anti-SSA 05/13/2024 <0.2  <1.0 AI Final    < 1.0 = NEGATIVE  >=1.0 = POSITIVE    Anti-SSB 05/13/2024 <0.2  <1.0 AI Final    < 1.0 = NEGATIVE  >=1.0 = POSITIVE    Anti-SCL-70 05/13/2024 <0.2  <1.0 AI Final    < 1.0 = NEGATIVE  >=1.0 = POSITIVE    Anti-MEGAN-1 IgG 05/13/2024 <0.2  <1.0 AI Final    < 1.0 = NEGATIVE  >=1.0 = POSITIVE    Anti-Chromatin 05/13/2024 <0.2  <1.0 AI Final    < 1.0 = NEGATIVE  >=1.0 = POSITIVE    Anti-Centromere 05/13/2024 <0.2  <1.0 AI Final    < 1.0 = NEGATIVE  >=1.0 = POSITIVE    ANTI-RIBOSOMAL P 05/13/2024 <0.2  <1.0 AI Final    < 1.0 = NEGATIVE  >=1.0 = POSITIVE    Anti-DNA (DS) 05/13/2024 <1.0  <5.0 IU/mL Final    NEGATIVE: <= 4 IU/ML  EQUIVOCAL: 5- 9 IU/ML  POSITIVE: >=10 IU/ML    Bill Only Thyroglobulin 05/13/2024 Billed   Final    Performed By: [facility]  [address] [place] [id number]  : Trevor Canchola MD, PhD  CLIA Number: [id number]   Lab on 12/11/2023   Component Date Value Ref Range Status    Albumin, Urine Random 12/11/2023 13.8  Not established mg/L Final    Creatinine, Urine Random 12/11/2023 103.6  20.0 - 320.0 mg/dL Final    Albumin/Creatinine Ratio 12/11/2023 13.3  <30.0 ug/mg Creat Final    WBC 12/11/2023 7.6  4.4 - 11.3 x10*3/uL Final    nRBC 12/11/2023 0.0  0.0 - 0.0 /100 WBCs Final    RBC 12/11/2023 3.78 (L)  4.00 - 5.20 x10*6/uL Final    Hemoglobin 12/11/2023 14.0  12.0 - 16.0 g/dL Final    Hematocrit 12/11/2023 40.4  36.0 - 46.0  % Final    MCV 12/11/2023 107 (H)  80 - 100 fL Final    MCH 12/11/2023 37.0 (H)  26.0 - 34.0 pg Final    MCHC 12/11/2023 34.7  32.0 - 36.0 g/dL Final    RDW 12/11/2023 12.9  11.5 - 14.5 % Final    Platelets 12/11/2023 359  150 - 450 x10*3/uL Final    Ferritin 12/11/2023 162 (H)  8 - 150 ng/mL Final    Iron 12/11/2023 73  35 - 150 ug/dL Final    UIBC 12/11/2023 273  110 - 370 ug/dL Final    TIBC 12/11/2023 346  240 - 445 ug/dL Final    % Saturation 12/11/2023 21 (L)  25 - 45 % Final    Parathyroid Hormone, Intact 12/11/2023 93.4 (H)  18.5 - 88.0 pg/mL Final    Glucose 12/11/2023 144 (H)  74 - 99 mg/dL Final    Sodium 12/11/2023 142  136 - 145 mmol/L Final    Potassium 12/11/2023 4.5  3.5 - 5.3 mmol/L Final    Chloride 12/11/2023 105  98 - 107 mmol/L Final    Bicarbonate 12/11/2023 26  21 - 32 mmol/L Final    Anion Gap 12/11/2023 16  10 - 20 mmol/L Final    Urea Nitrogen 12/11/2023 17  6 - 23 mg/dL Final    Creatinine 12/11/2023 1.37 (H)  0.50 - 1.05 mg/dL Final    eGFR 12/11/2023 39 (L)  >60 mL/min/1.73m*2 Final    Calculations of estimated GFR are performed using the 2021 CKD-EPI Study Refit equation without the race variable for the IDMS-Traceable creatinine methods.  https://jasn.asnjournals.org/content/early/2021/09/22/ASN.4692896448    Calcium 12/11/2023 9.6  8.6 - 10.6 mg/dL Final    Phosphorus 12/11/2023 2.7  2.5 - 4.9 mg/dL Final    The performance characteristics of phosphorus testing in heparinized plasma have been validated by the individual  laboratory site where testing is performed. Testing on heparinized plasma is not approved by the FDA; however, such approval is not necessary.    Albumin 12/11/2023 4.0  3.4 - 5.0 g/dL Final   Office Visit on 10/25/2023   Component Date Value Ref Range Status    Ventricular Rate 10/30/2023 70  BPM Final    Atrial Rate 10/30/2023 70  BPM Final    MT Interval 10/30/2023 156  ms Final    QRS Duration 10/30/2023 84  ms Final    QT Interval 10/30/2023 374  ms Final     QTC Calculation(Bazett) 10/30/2023 403  ms Final    P Axis 10/30/2023 73  degrees Final    R Axis 10/30/2023 82  degrees Final    T Axis 10/30/2023 64  degrees Final    QRS Count 10/30/2023 12  beats Final    Q Onset 10/30/2023 221  ms Final    P Onset 10/30/2023 143  ms Final    P Offset 10/30/2023 195  ms Final    T Offset 10/30/2023 408  ms Final    QTC Fredericia 10/30/2023 393  ms Final         Diagnostic Test Results and Labs:  - Echocardiogram (May 10, 2024):    - Normal left ventricular ejection fraction.    - Mild aortic valve regurgitation.  - Laboratory Tests:    - Metanephrine: Normal    - Vitamin E: Normal    - Vitamin B6: Normal    - Triiodothyronine: Normal    - Erythrocyte Sedimentation Rate (ESR): Normal    - Rheumatoid Factor: Normal    - Phenotypic Atrophoresis: Normal    - Angiotensin-Converting Enzyme: Normal    - Anti-Parietal Cells: Normal    - Catecholamines: Normal    - White Blood Cells: Elevated    - Red Blood Cells: Mildly enlarged    - Blood Sugar: Elevated    - Kidney Function: Compromised    - Vitamin B12 (May 10, 2023): 1,287 (Normal)    - Methylmalonic Acid: Normal    - Folate: Pending additional testing    - Glucose Tolerance Test: Ordered      Social history:  She is , has a supportive , 3 sons and one daughter.  She denies smoking, ETOH and substance use, resides in both [place] and [place], presents with a history of health concerns. She has been engaged in hobbies such as puzzles and reading and participates in physical activities like golf, and Yoga albeit to a lesser extent due to her fainting spells.     She was evaluate by the following specialists:    General Neurology.  Dr. Miranda, who performed an extensive testing, including autonomic nervous system tests, which identified a moderate autonomic neuropathy.     Neurology, Dr Chatman,   Movement disorder specialist   She has 2-year history of mild cognitive decline, personality changes with apathy and  decreased emotional response, recurrent syncope and orthostasis.  On neurologic exam she has marked hypomimia, mild upper extremity bradykinesia and mild parkinsonian gait.  Unifying diagnosis could be early prodromal Parkinson's disease, however at this point she does not meet diagnostic criteria for PD.  Regarding her orthostatic hypotension, this could be related to an underlying synucleopathy however cardiac causes and other autonomic neuropathies are also possible.       Nephrology; Dr. Shweta Garcia, who had the following impression:  #CKD stage III initially was worsening currently stable, and has been monitoring her hypertension on Losartan 50 mg and osteoporosis on Vitamin D is low-continue D3 ( was on Alendronate 70 mg weekly-currently on hold)     Cardiology Dr. Abel Chong, who reported:  Patient had one episode of syncope in July during yoga lasting 2-3 seconds, no warning signs. She had a near syncopal episode about 3 weeks ago during yoga. Event monitor revealed episodes of atrial flutter (longest lasting ~ 7 minutes) and atrial tach with rates up to 182 bpm. She is currently on Xarelto (hx of PE). CT calcium score of 84 (RCA). Will initiate Flecainide 50mg bid.     She had a cardiac loop recorder placement .     Diagnostic Test Results:  - Autonomic nervous system testing (January 10, 2024) which reported:  This is an abnormal cardiovascular autonomic test panel due to the presence of a low VR, abnormal BP responses to the VM, mild orthostatic hypotension and moderate supine hypertension without cardiac sympathetic failure. While there is no evidence of a significant cardiovagal abnormality, there are findings suggestive of a mild peripheral vasomotor adrenergic abnormality.   Moreover, the QSART findings are consistent with a postganglionic sympathetic sudomotor abnormality like that seen in a non-length dependent autonomic/small fiber neuropathy, as sparing of the foot is not  typical of a distal axonopathy.     - Carotid Doppler: Heterogeneous calcified plaques in right and left internal carotid arteries, with no narrowing greater than 50%..    - Echocardiogram (2021): Performed which reported   1. The left ventricular systolic function is normal with a 60-65% estimated ejection fraction.   2. Spectral Doppler shows an impaired relaxation pattern of left ventricular diastolic filling.   3. The right ventricle is mildly enlarged. There is normal right ventricular global systolic function.   4. There is mild tricuspid regurgitation.   5. Moderately elevated pulmonary artery pressure, estimated RVSP 50mmHg.    Last Cardiology Tests:  ECG (10/25/23):  Sinus rhythm, HR 70 bpm.        Echo (2021):     CONCLUSIONS:   1. The left ventricular systolic function is normal with a 60-65% estimated ejection fraction.   2. Spectral Doppler shows an impaired relaxation pattern of left ventricular diastolic filling.   3. The right ventricle is mildly enlarged. There is normal right ventricular global systolic function.   4. There is mild tricuspid regurgitation.   5. Moderately elevated pulmonary artery pressure, estimated RVSP 50mmHg.     Event monitor (11 days - 09/2023):  Patient had a min HR of 52 bpm, max HR of 182 bpm, and avg HR of 83 bpm. Predominant underlying rhythm was Sinus Rhythm. 115 Supraventricular Tachycardia runs occurred, the run with the fastest interval lasting 10 beats with a max rate of 182 bpm, the longest lasting 15 beats with an avg rate of 118 bpm. Some episodes of Supraventricular Tachycardia may be possible Atrial Tachycardia with variable block. Atrial Flutter occurred (<1% burden), ranging from  bpm (avg of 133 bpm), the longest lasting 6 mins 57 secs with an avg rate of 143 bpm. Isolated SVEs were rare (<1.0%), SVE Couplets were rare (<1.0%), and SVE Triplets were rare (<1.0%). Isolated VEs were rare (<1.0%), VE Couplets were rare (<1.0%), and no VE Triplets were  present. Ventricular Bigeminy was present.     CT cardiac scoring (10/25/2023):  IMPRESSION:  1. Coronary artery calcium score of 84.8*.      *Coronary Artery  Agatston score      Score  risk  Very low 1-99  Mildly increased 100-299  Moderately increased >300  Moderate to severely increased >800      - CT Scan of the brain (July 2023): Showed no significant atrophy, appearing normal.    - Implanted loop monitor: Present, indicating monitoring of heart rhythm.       Patient Active Problem List   Diagnosis    Abnormal mammogram    Acute pulmonary embolism (Multi)    Pulmonary embolus (Multi)    Behavioral change    Benign essential hypertension    Cogwheel rigidity    Colon polyps    Elevated serum creatinine    Exposure to COVID-19 virus    Extrapyramidal movement disorder    Hyperlipidemia    Idiopathic osteoporosis    Lung nodule    Mild cognitive impairment    Presence of IVC filter    Stage 3b chronic kidney disease (Multi)    Vitamin B12 deficiency    Vitamin D deficiency    Syncope    Leg weakness    Atrial flutter, unspecified type (Multi)    SVT (supraventricular tachycardia) (CMS-HCC)             Results:         Lab Results   Component Value Date    SIZKFRAP55 1287 (H) 05/10/2023       IRON STUDIES:   Lab Results   Component Value Date    IRON 143 06/17/2024    TIBC 375 06/17/2024    FERRITIN 91 06/17/2024   His lab workup revealed    Lab Results   Component Value Date    SPEP Normal. 05/13/2024     CBC:   Lab Results   Component Value Date    WBC 6.9 06/17/2024    HGB 14.5 06/17/2024    HCT 43.7 06/17/2024     06/17/2024     BMP:   Lab Results   Component Value Date     06/17/2024    K 5.0 06/17/2024     06/17/2024    CO2 26 06/17/2024    BUN 33 (H) 06/17/2024    CREATININE 1.61 (H) 06/17/2024    CALCIUM 9.9 06/17/2024    MG 2.22 06/17/2024    PHOS 3.5 06/17/2024     LFT:   Lab Results   Component Value Date    ALKPHOS 57 07/31/2023    BILITOT 0.6 07/31/2023    PROT 6.6 05/13/2024     ALBUMIN 3.9 06/17/2024    ALT 9 07/31/2023    AST 16 07/31/2023             Problems Assessed/Relevant:  Problem List Items Addressed This Visit    None    Past Medical History:   Diagnosis Date    Encounter for gynecological examination (general) (routine) without abnormal findings     Encounter for routine pelvic examination    Other conditions influencing health status     Screening for malignant neoplasms, colon     Past Surgical History:   Procedure Laterality Date    CARDIAC ELECTROPHYSIOLOGY PROCEDURE Left 11/30/2023    Procedure: Loop Insertion;  Surgeon: Abel Chong MD;  Location[facility] Card[facility] Lab;  Service: Electrophysiology;  Laterality: Left;    OTHER SURGICAL HISTORY  05/31/2013    Surgery Excision Of Parotid Tumor/Gland    OTHER SURGICAL HISTORY  05/31/2013    Spinal Diskectomy Lumbar    TONSILLECTOMY  05/31/2013    Tonsillectomy     Family History   Problem Relation Name Age of Onset    Colon cancer Mother      Other (Old Age) Father       Social History     Tobacco Use    Smoking status: Never    Smokeless tobacco: Never   Substance Use Topics    Alcohol use: Yes     Alcohol/week: 7.0 standard drinks of alcohol     Types: 7 Glasses of wine per week      Allergies   Allergen Reactions    Sulfa (Sulfonamide Antibiotics) Hives and Rash              Physical Exam:     There were no vitals taken for this visit. ( Virtual visit). She appears on no active distress.        Impression/Plan:   Paroxysmal spells of fainting for the last few years described as transient few seconds with associated orthostatic hypotension with physical exam consistent with masked face, hypomimia, decreased swinging of the left arm while walking, and bradykinesia.    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      Antihypertensive medications and dehydration may play a role in her dizziness as well.       The patient has been doing relatively well in the last few months as she had no syncopal episodes since March 2024    Orthostatic Hypotension and Autonomic Dysfunction    - Plan:    - Continue monitoring blood pressure and heart rate regularly.    - Maintain use of compression stockings and abdominal binder during the day.    - No need for Midodrine at this time, as she is stable without medication.    - Continue hydration with 2-3 liters (120 ounces) of water daily.    Small Fiber Neuropathy by aging, renal dysfunction and hyperglycemia.      - Plan:    - Glucose tolerance test and Folate blood level.    Physical Therapy and Balance    - Plan:    - Continue home-based physical therapy as recommended by the physical therapist.    - Consult with the physical therapist regarding the safety of chair yoga and any additional upper body exercises.    Cognitive Function    - Plan:    - Focus on addressing kidney function, hyperglycemia and hypertension management.     - Encourage social, physical, and mental activity to slow down cognitive decline.        Follow-up    - Plan:    - Schedule a follow-up appointment in six months (December) via Zoom, with the option to switch to an in-person visit if needed.    - If any significant changes in her condition occur, consider an earlier follow-up and referral to relevant specialists as needed.     Case was discussed in details with , and sons.       Reviewed and approved by ABIGAIL CHAPIN on 7/22/24 at 10:57 AM.    I personally spent [40] minutes on the  day of the visit completing the review of the medical record and outside records, obtaining history and performing an appropriate physical exam, patient care, counseling and education, placing orders, independently reviewing results, communicating with the patient/family, coordinating care and performing appropriate clinical documentation.    Juan Alfred M.D.

## 2024-07-24 ENCOUNTER — TELEPHONE (OUTPATIENT)
Dept: NEUROLOGY | Facility: CLINIC | Age: 84
End: 2024-07-24
Payer: MEDICARE

## 2024-07-24 NOTE — TELEPHONE ENCOUNTER
Patient's  called several times not understanding how to prepare for a Glucose test that is 3 hours. I spent 20 mins explaining information about the lab and where Mr. Ding can take his wife for the test. He had a hard time understanding the information that was being explained to him. He has called multiple times asking for help.

## 2024-07-29 ENCOUNTER — LAB (OUTPATIENT)
Dept: LAB | Facility: LAB | Age: 84
End: 2024-07-29
Payer: MEDICARE

## 2024-07-29 ENCOUNTER — HOSPITAL ENCOUNTER (OUTPATIENT)
Dept: CARDIOLOGY | Facility: CLINIC | Age: 84
Discharge: HOME | End: 2024-07-29
Payer: MEDICARE

## 2024-07-29 DIAGNOSIS — Z45.09 ENCOUNTER FOR LOOP RECORDER CHECK: ICD-10-CM

## 2024-07-29 DIAGNOSIS — G62.9 SMALL FIBER NEUROPATHY: ICD-10-CM

## 2024-07-29 DIAGNOSIS — R55 SYNCOPE AND COLLAPSE: ICD-10-CM

## 2024-07-29 LAB
FOLATE SERPL-MCNC: 13.1 NG/ML
GLUCOSE 1H P 75 G GLC PO SERPL-MCNC: 158 MG/DL
GLUCOSE 2H P 75 G GLC PO SERPL-MCNC: 144 MG/DL
GLUCOSE 3H P 75 G GLC PO SERPL-MCNC: 136 MG/DL
GLUCOSE P FAST SERPL-MCNC: 99 MG/DL

## 2024-07-29 PROCEDURE — 82952 GTT-ADDED SAMPLES: CPT

## 2024-07-29 PROCEDURE — 82951 GLUCOSE TOLERANCE TEST (GTT): CPT

## 2024-07-29 PROCEDURE — 36415 COLL VENOUS BLD VENIPUNCTURE: CPT

## 2024-07-29 PROCEDURE — 82746 ASSAY OF FOLIC ACID SERUM: CPT

## 2024-08-05 ENCOUNTER — HOSPITAL ENCOUNTER (OUTPATIENT)
Dept: CARDIOLOGY | Facility: CLINIC | Age: 84
Discharge: HOME | End: 2024-08-05
Payer: MEDICARE

## 2024-08-05 DIAGNOSIS — Z45.09 ENCOUNTER FOR LOOP RECORDER CHECK: ICD-10-CM

## 2024-08-05 DIAGNOSIS — R55 SYNCOPE AND COLLAPSE: ICD-10-CM

## 2024-08-12 ENCOUNTER — HOSPITAL ENCOUNTER (OUTPATIENT)
Dept: CARDIOLOGY | Facility: CLINIC | Age: 84
Discharge: HOME | End: 2024-08-12
Payer: MEDICARE

## 2024-08-12 DIAGNOSIS — R55 SYNCOPE AND COLLAPSE: ICD-10-CM

## 2024-08-12 DIAGNOSIS — Z45.09 ENCOUNTER FOR LOOP RECORDER CHECK: ICD-10-CM

## 2024-08-13 ENCOUNTER — HOSPITAL ENCOUNTER (OUTPATIENT)
Dept: CARDIOLOGY | Facility: CLINIC | Age: 84
Discharge: HOME | End: 2024-08-13
Payer: MEDICARE

## 2024-08-13 DIAGNOSIS — Z45.09 ENCOUNTER FOR LOOP RECORDER CHECK: ICD-10-CM

## 2024-08-13 DIAGNOSIS — R55 SYNCOPE AND COLLAPSE: ICD-10-CM

## 2024-08-15 ENCOUNTER — HOSPITAL ENCOUNTER (OUTPATIENT)
Dept: CARDIOLOGY | Facility: CLINIC | Age: 84
Discharge: HOME | End: 2024-08-15
Payer: MEDICARE

## 2024-08-15 DIAGNOSIS — Z45.09 ENCOUNTER FOR LOOP RECORDER CHECK: ICD-10-CM

## 2024-08-15 DIAGNOSIS — R55 SYNCOPE AND COLLAPSE: ICD-10-CM

## 2024-08-19 ENCOUNTER — HOSPITAL ENCOUNTER (OUTPATIENT)
Dept: CARDIOLOGY | Facility: CLINIC | Age: 84
Discharge: HOME | End: 2024-08-19
Payer: MEDICARE

## 2024-08-19 DIAGNOSIS — R55 SYNCOPE AND COLLAPSE: ICD-10-CM

## 2024-08-19 DIAGNOSIS — Z45.09 ENCOUNTER FOR LOOP RECORDER CHECK: ICD-10-CM

## 2024-08-19 PROCEDURE — 93296 REM INTERROG EVL PM/IDS: CPT

## 2024-08-21 ENCOUNTER — APPOINTMENT (OUTPATIENT)
Dept: NEUROLOGY | Facility: CLINIC | Age: 84
End: 2024-08-21
Payer: MEDICARE

## 2024-08-28 ENCOUNTER — TELEPHONE (OUTPATIENT)
Dept: CARDIOLOGY | Facility: CLINIC | Age: 84
End: 2024-08-28
Payer: MEDICARE

## 2024-08-28 NOTE — TELEPHONE ENCOUNTER
Hello! She can hold her Xarelto for 3 days. She may resume 24-48 hours after surgery if cleared bu surgeon.

## 2024-08-28 NOTE — TELEPHONE ENCOUNTER
We received a faxed surgical clearance for this patient. She is having an ectropion repair with debulking of fat pad and excision of Chalazions on left lower eyelid. She is on Xarelto and request that she hold it for at least 3 days. Please advise.

## 2024-08-29 ENCOUNTER — APPOINTMENT (OUTPATIENT)
Dept: NEUROLOGY | Facility: HOSPITAL | Age: 84
End: 2024-08-29
Payer: MEDICARE

## 2024-09-03 ENCOUNTER — HOSPITAL ENCOUNTER (OUTPATIENT)
Dept: CARDIOLOGY | Facility: CLINIC | Age: 84
Discharge: HOME | End: 2024-09-03
Payer: MEDICARE

## 2024-09-03 DIAGNOSIS — R55 SYNCOPE AND COLLAPSE: ICD-10-CM

## 2024-09-03 DIAGNOSIS — Z45.09 ENCOUNTER FOR LOOP RECORDER CHECK: ICD-10-CM

## 2024-09-05 DIAGNOSIS — I26.09 OTHER PULMONARY EMBOLISM WITH ACUTE COR PULMONALE (MULTI): ICD-10-CM

## 2024-09-09 ENCOUNTER — HOSPITAL ENCOUNTER (OUTPATIENT)
Dept: CARDIOLOGY | Facility: CLINIC | Age: 84
Discharge: HOME | End: 2024-09-09
Payer: MEDICARE

## 2024-09-09 DIAGNOSIS — R55 SYNCOPE AND COLLAPSE: ICD-10-CM

## 2024-09-09 DIAGNOSIS — Z45.09 ENCOUNTER FOR LOOP RECORDER CHECK: ICD-10-CM

## 2024-09-13 ENCOUNTER — HOSPITAL ENCOUNTER (OUTPATIENT)
Dept: CARDIOLOGY | Facility: CLINIC | Age: 84
Discharge: HOME | End: 2024-09-13
Payer: MEDICARE

## 2024-09-13 DIAGNOSIS — R55 SYNCOPE AND COLLAPSE: ICD-10-CM

## 2024-09-13 DIAGNOSIS — Z45.09 ENCOUNTER FOR LOOP RECORDER CHECK: ICD-10-CM

## 2024-09-19 ENCOUNTER — APPOINTMENT (OUTPATIENT)
Dept: PRIMARY CARE | Facility: CLINIC | Age: 84
End: 2024-09-19
Payer: MEDICARE

## 2024-09-19 VITALS
DIASTOLIC BLOOD PRESSURE: 58 MMHG | SYSTOLIC BLOOD PRESSURE: 140 MMHG | HEART RATE: 88 BPM | WEIGHT: 150 LBS | BODY MASS INDEX: 24.58 KG/M2 | OXYGEN SATURATION: 98 %

## 2024-09-19 DIAGNOSIS — Z01.818 PREOP EXAMINATION: Primary | ICD-10-CM

## 2024-09-19 DIAGNOSIS — H02.125 MECHANICAL ECTROPION OF LEFT LOWER EYELID: ICD-10-CM

## 2024-09-19 DIAGNOSIS — I26.99 OTHER ACUTE PULMONARY EMBOLISM, UNSPECIFIED WHETHER ACUTE COR PULMONALE PRESENT (MULTI): ICD-10-CM

## 2024-09-19 PROCEDURE — 1159F MED LIST DOCD IN RCRD: CPT | Performed by: INTERNAL MEDICINE

## 2024-09-19 PROCEDURE — 99214 OFFICE O/P EST MOD 30 MIN: CPT | Performed by: INTERNAL MEDICINE

## 2024-09-19 PROCEDURE — 3077F SYST BP >= 140 MM HG: CPT | Performed by: INTERNAL MEDICINE

## 2024-09-19 PROCEDURE — 1036F TOBACCO NON-USER: CPT | Performed by: INTERNAL MEDICINE

## 2024-09-19 PROCEDURE — 1160F RVW MEDS BY RX/DR IN RCRD: CPT | Performed by: INTERNAL MEDICINE

## 2024-09-19 PROCEDURE — 3078F DIAST BP <80 MM HG: CPT | Performed by: INTERNAL MEDICINE

## 2024-09-19 NOTE — PROGRESS NOTES
Subjective   Patient ID: Lorie Ding is a 83 y.o. female who presents for Pre-op Exam.    HPI     Review of Systems    Objective   Pulse 88   Wt 68 kg (150 lb)   SpO2 98%   BMI 24.58 kg/m²     Physical Exam    Assessment/Plan

## 2024-09-19 NOTE — ASSESSMENT & PLAN NOTE
First PE in January 2008, unprovoked  - hypercoag work up by Dr. Siobhan Rand normal  Second - B/l PE in June 2021, [place]  Third PE in December of 2021  Had second hypercoag work up by Dr. Daniel Hernandez, negative

## 2024-09-19 NOTE — PROGRESS NOTES
Subjective   Patient ID: Lorie Ding is a 83 y.o. female who presents for Pre-op Exam.    No issues with anesthesia  She is exercises and walks regularly  No cp or pressure  No sob except going up the hill.   Bowels are regular  No urinary complaints  Having her left lower lid fixed.            Review of Systems    Objective   /58   Pulse 88   Wt 68 kg (150 lb)   SpO2 98%   BMI 24.58 kg/m²     Physical Exam  Constitutional:       Appearance: Normal appearance.   Eyes:      Comments: Left lower lid ectropion, more at inner lower lid   Neck:      Vascular: No carotid bruit.   Cardiovascular:      Rate and Rhythm: Normal rate.      Heart sounds: No murmur heard.  Pulmonary:      Effort: Pulmonary effort is normal.      Breath sounds: Normal breath sounds.   Abdominal:      Palpations: Abdomen is soft.      Tenderness: There is no abdominal tenderness.   Lymphadenopathy:      Cervical: No cervical adenopathy.   Skin:     Coloration: Skin is not jaundiced or pale.      Comments: Lower extremities with stasis change and solar changes   Neurological:      Mental Status: She is alert.      Comments: Masked face,   Bradykinesia noted  No tremor   Psychiatric:         Mood and Affect: Mood normal.         Assessment/Plan          Patient Instructions   Preop clearance for ectropion repair/left  Hold xarelto for 2 days prior to surgery, day of surgery and 1 day after surgery, if bleeding is controlled and okay with surgery, restart xarelto 2nd day after surgery to reduce blood clot risk.      Cardiology stated to hold 3 days before and 2 days after, but they are stating that due to afib history and pt has a history of recurrent PE , has seen hematology and no hypercoag state, but has 3 Pe's  First January 2008  Second PE, b/l with right heart strain in June 2021/[facility]  Third PE in December 2021  Had 2 hypercoag work ups, in 2008 and 2021, both negative  On life time anticoagulation

## 2024-09-19 NOTE — PATIENT INSTRUCTIONS
Preop clearance for ectropion repair/left  Hold xarelto for 2 days prior to surgery, day of surgery and 1 day after surgery, if bleeding is controlled and okay with surgery, restart xarelto 2nd day after surgery to reduce blood clot risk.

## 2024-09-26 ENCOUNTER — HOSPITAL ENCOUNTER (OUTPATIENT)
Dept: CARDIOLOGY | Facility: CLINIC | Age: 84
Discharge: HOME | End: 2024-09-26
Payer: MEDICARE

## 2024-09-26 DIAGNOSIS — Z45.09 ENCOUNTER FOR LOOP RECORDER CHECK: ICD-10-CM

## 2024-09-26 DIAGNOSIS — R55 SYNCOPE AND COLLAPSE: ICD-10-CM

## 2024-09-26 PROCEDURE — 93298 REM INTERROG DEV EVAL SCRMS: CPT

## 2024-10-04 ENCOUNTER — APPOINTMENT (OUTPATIENT)
Dept: PRIMARY CARE | Facility: CLINIC | Age: 84
End: 2024-10-04
Payer: MEDICARE

## 2024-10-11 ENCOUNTER — HOSPITAL ENCOUNTER (OUTPATIENT)
Dept: CARDIOLOGY | Facility: CLINIC | Age: 84
Discharge: HOME | End: 2024-10-11
Payer: MEDICARE

## 2024-10-11 DIAGNOSIS — R55 SYNCOPE AND COLLAPSE: ICD-10-CM

## 2024-10-11 DIAGNOSIS — Z45.09 ENCOUNTER FOR LOOP RECORDER CHECK: ICD-10-CM

## 2024-10-14 ENCOUNTER — HOSPITAL ENCOUNTER (OUTPATIENT)
Dept: CARDIOLOGY | Facility: CLINIC | Age: 84
Discharge: HOME | End: 2024-10-14
Payer: MEDICARE

## 2024-10-14 DIAGNOSIS — R55 SYNCOPE AND COLLAPSE: ICD-10-CM

## 2024-10-14 DIAGNOSIS — Z45.09 ENCOUNTER FOR LOOP RECORDER CHECK: ICD-10-CM

## 2024-10-17 ENCOUNTER — HOSPITAL ENCOUNTER (OUTPATIENT)
Dept: CARDIOLOGY | Facility: CLINIC | Age: 84
Discharge: HOME | End: 2024-10-17
Payer: MEDICARE

## 2024-10-17 DIAGNOSIS — Z45.09 ENCOUNTER FOR LOOP RECORDER CHECK: ICD-10-CM

## 2024-10-17 DIAGNOSIS — R55 SYNCOPE AND COLLAPSE: ICD-10-CM

## 2024-10-21 ENCOUNTER — HOSPITAL ENCOUNTER (OUTPATIENT)
Dept: CARDIOLOGY | Facility: CLINIC | Age: 84
Discharge: HOME | End: 2024-10-21
Payer: MEDICARE

## 2024-10-21 ENCOUNTER — HOSPITAL ENCOUNTER (OUTPATIENT)
Dept: RADIOLOGY | Facility: HOSPITAL | Age: 84
Discharge: HOME | End: 2024-10-21
Payer: MEDICARE

## 2024-10-21 VITALS — WEIGHT: 150 LBS | HEIGHT: 66 IN | BODY MASS INDEX: 24.11 KG/M2

## 2024-10-21 DIAGNOSIS — Z45.09 ENCOUNTER FOR LOOP RECORDER CHECK: ICD-10-CM

## 2024-10-21 DIAGNOSIS — Z12.31 VISIT FOR SCREENING MAMMOGRAM: ICD-10-CM

## 2024-10-21 DIAGNOSIS — R55 SYNCOPE AND COLLAPSE: ICD-10-CM

## 2024-10-21 LAB — BODY SURFACE AREA: 1.77 M2

## 2024-10-21 PROCEDURE — 77063 BREAST TOMOSYNTHESIS BI: CPT | Performed by: RADIOLOGY

## 2024-10-21 PROCEDURE — 77067 SCR MAMMO BI INCL CAD: CPT | Performed by: RADIOLOGY

## 2024-10-21 PROCEDURE — 77067 SCR MAMMO BI INCL CAD: CPT

## 2024-10-22 DIAGNOSIS — R92.8 ABNORMAL MAMMOGRAM: Primary | ICD-10-CM

## 2024-10-22 NOTE — PROGRESS NOTES
Most recent mammogram results reviewed.  Updated ultrasound orders placed.  IMPRESSION: Multiple low-density smoothly marginated masses within the breasts bilaterally which may represent cysts but incompletely characterized in this study. Further evaluation with bilateral breast ultrasound recommended.

## 2024-10-23 ENCOUNTER — OFFICE VISIT (OUTPATIENT)
Dept: NEUROLOGY | Facility: CLINIC | Age: 84
End: 2024-10-23
Payer: MEDICARE

## 2024-10-23 VITALS — DIASTOLIC BLOOD PRESSURE: 74 MMHG | HEART RATE: 81 BPM | SYSTOLIC BLOOD PRESSURE: 136 MMHG

## 2024-10-23 DIAGNOSIS — R41.3 MEMORY LOSS: ICD-10-CM

## 2024-10-23 DIAGNOSIS — I95.1 ORTHOSTATIC HYPOTENSION: Primary | ICD-10-CM

## 2024-10-23 PROCEDURE — 3075F SYST BP GE 130 - 139MM HG: CPT | Performed by: SPECIALIST

## 2024-10-23 PROCEDURE — 99215 OFFICE O/P EST HI 40 MIN: CPT | Performed by: SPECIALIST

## 2024-10-23 PROCEDURE — 1036F TOBACCO NON-USER: CPT | Performed by: SPECIALIST

## 2024-10-23 PROCEDURE — 3078F DIAST BP <80 MM HG: CPT | Performed by: SPECIALIST

## 2024-10-23 PROCEDURE — 1159F MED LIST DOCD IN RCRD: CPT | Performed by: SPECIALIST

## 2024-10-23 ASSESSMENT — ENCOUNTER SYMPTOMS
LOSS OF SENSATION IN FEET: 0
OCCASIONAL FEELINGS OF UNSTEADINESS: 0
DEPRESSION: 0

## 2024-10-23 ASSESSMENT — PATIENT HEALTH QUESTIONNAIRE - PHQ9
1. LITTLE INTEREST OR PLEASURE IN DOING THINGS: NOT AT ALL
SUM OF ALL RESPONSES TO PHQ9 QUESTIONS 1 AND 2: 0
2. FEELING DOWN, DEPRESSED OR HOPELESS: NOT AT ALL

## 2024-10-23 NOTE — PROGRESS NOTES
Date of Service: 10/23/2024  Patient: Lorie Ding  MRN: [id number]  Referring Provider: No ref. provider found  Primary Care Physician: No Assigned PCP Generic Provider, MD     History of Present Illness:    Ms. Ding is a 83 y.o. left handed female  with a past medical history of  hypertension, chronic kidney disease stage 3, DVT and pulmonary embolism (on Xarelto),who  has been suffering  for the last 2 years history of mild cognitive decline, personality changes, apathy and decreased emotional response, recurrent syncope and orthostasis. She had form fainting episodes that have been increasing in frequency, with about 10 to 12 episodes since the beginning of 2023. These episodes are often triggered by standing up or during physical exertion, leading to advice against walking alone due to fainting risks.    Additionally, Lorie describes significant personality changes following two falls in 2021,  that resulted in head injuries. She notes a diminished emotional response and a decreased interest in social interactions.    Her family has expressed concern over these changes, observing that Lorie has become less engaged in conversations and has withdrawn from social activities, now primarily interacting with family members.      Subjective:  Since seen last, Lorie she experienced a syncopal episode On October 9th, after exiting the bathroom.    Her  laid her down and called 911. She experienced vomiting, the cause of which remains undetermined and warrants further investigation. She has yet to discuss this incident with her primary care physician, with an appointment scheduled for October 31st.    Medical Management:  She had an abnormal mammogram result on Monday, mirroring an issue from the previous year.   She reports no fever or chills associated with the recent episode. Her blood pressure and heart rate have been historically within normal ranges, with readings of 120/71 on September 11th and 121/67 on  September 21st. She has been using compression stockings since March but does not use an abdominal binder.    Lifestyle:  She is physically active, engaging in exercise five times a week, and maintains stable vitals. Her recent health episode is considered a one-time event, likely induced by vomiting leading to a vasovagal response that slowed her heart rate. Despite good cardiac health as evaluated by her cardiologist, she expresses concerns about her memory and contemplates seeing a neurologist for further evaluation. She remains mentally active through reading and puzzles but reports low social activity. She has previously tried physical therapy, focusing on lower leg exercises at home after discontinuing it, and expresses reluctance to resume chair yoga, preferring other physical activities like golf.         Current Outpatient Medications on File Prior to Visit   Medication Sig Dispense Refill    cholecalciferol (Vitamin D-3) 50 mcg (2,000 unit) capsule Take 1 capsule (50 mcg) by mouth once daily.      cyanocobalamin, vitamin B-12, (Vitamin B-12) 1,000 mcg tablet extended release Take 1 tablet (1,000 mcg) by mouth once daily. as directed      erythromycin (Romycin) 5 mg/gram (0.5 %) ophthalmic ointment Apply 1 Application to affected eye(s).      flecainide (Tambocor) 50 mg tablet Take 1 tablet (50 mg) by mouth 2 times a day. 180 tablet 1    losartan (Cozaar) 25 mg tablet Take 1 tablet (25 mg) by mouth once daily. 90 tablet 3    rivaroxaban (Xarelto) 20 mg tablet Take 1 tablet (20 mg) by mouth once daily with breakfast. Take with food. 90 tablet 1     No current facility-administered medications on file prior to visit.     I have personally reviewed the patient's lab work and results for the last year:  Hospital Outpatient Visit on 10/21/2024   Component Date Value Ref Range Status    BSA 10/21/2024 1.77  m2 In process   Lab on 07/29/2024   Component Date Value Ref Range Status    Folate, Serum 07/29/2024 13.1   >5.0 ng/mL Final    Glucose, Fasting  07/29/2024 99  mg/dL Final    Glucose, One hour 07/29/2024 158  mg/dL Final    Glucose, Two hour 07/29/2024 144  mg/dL Final    Glucose, Three hour 07/29/2024 136  mg/dL Final   Lab on 06/17/2024   Component Date Value Ref Range Status    WBC 06/17/2024 6.9  4.4 - 11.3 x10*3/uL Final    nRBC 06/17/2024 0.0  0.0 - 0.0 /100 WBCs Final    RBC 06/17/2024 4.26  4.00 - 5.20 x10*6/uL Final    Hemoglobin 06/17/2024 14.5  12.0 - 16.0 g/dL Final    Hematocrit 06/17/2024 43.7  36.0 - 46.0 % Final    MCV 06/17/2024 103 (H)  80 - 100 fL Final    MCH 06/17/2024 34.0  26.0 - 34.0 pg Final    MCHC 06/17/2024 33.2  32.0 - 36.0 g/dL Final    RDW 06/17/2024 12.9  11.5 - 14.5 % Final    Platelets 06/17/2024 247  150 - 450 x10*3/uL Final    Ferritin 06/17/2024 91  8 - 150 ng/mL Final    Iron 06/17/2024 143  35 - 150 ug/dL Final    UIBC 06/17/2024 232  110 - 370 ug/dL Final    TIBC 06/17/2024 375  240 - 445 ug/dL Final    % Saturation 06/17/2024 38  25 - 45 % Final    Magnesium 06/17/2024 2.22  1.60 - 2.40 mg/dL Final    Parathyroid Hormone, Intact 06/17/2024 69.5  18.5 - 88.0 pg/mL Final    Glucose 06/17/2024 85  74 - 99 mg/dL Final    Sodium 06/17/2024 140  136 - 145 mmol/L Final    Potassium 06/17/2024 5.0  3.5 - 5.3 mmol/L Final    Chloride 06/17/2024 104  98 - 107 mmol/L Final    Bicarbonate 06/17/2024 26  21 - 32 mmol/L Final    Anion Gap 06/17/2024 15  10 - 20 mmol/L Final    Urea Nitrogen 06/17/2024 33 (H)  6 - 23 mg/dL Final    Creatinine 06/17/2024 1.61 (H)  0.50 - 1.05 mg/dL Final    eGFR 06/17/2024 32 (L)  >60 mL/min/1.73m*2 Final    Calculations of estimated GFR are performed using the 2021 CKD-EPI Study Refit equation without the race variable for the IDMS-Traceable creatinine methods.  https://jasn.asnjournals.org/content/early/2021/09/22/ASN.1802907899    Calcium 06/17/2024 9.9  8.6 - 10.6 mg/dL Final    Phosphorus 06/17/2024 3.5  2.5 - 4.9 mg/dL Final    The performance  characteristics of phosphorus testing in heparinized plasma have been validated by the individual  laboratory site where testing is performed. Testing on heparinized plasma is not approved by the FDA; however, such approval is not necessary.    Albumin 06/17/2024 3.9  3.4 - 5.0 g/dL Final    Vitamin D, 25-Hydroxy, Total 06/17/2024 60  30 - 100 ng/mL Final   Hospital Outpatient Visit on 06/10/2024   Component Date Value Ref Range Status    AV pk siomara 06/10/2024 1.61  m/s Final    AV mn grad 06/10/2024 6.0  mmHg Final    LVOT diam 06/10/2024 2.00  cm Final    LV Biplane EF 06/10/2024 63  % Final    MV avg E/e' ratio 06/10/2024 8.04   Final    MV E/A ratio 06/10/2024 0.92   Final    LA vol index A/L 06/10/2024 15.7  ml/m2 Final    Tricuspid annular plane systolic e* 06/10/2024 1.8  cm Final    RV free wall pk S' 06/10/2024 10.90  cm/s Final    LVIDd 06/10/2024 3.92  cm Final    RVSP 06/10/2024 48.4  mmHg Final    Aortic Valve Area by Continuity of* 06/10/2024 1.80  cm2 Final    Aortic Valve Area by Continuity of* 06/10/2024 1.91  cm2 Final    AV pk grad 06/10/2024 10.4  mmHg Final    LV A4C EF 06/10/2024 63.7   Final   Office Visit on 06/03/2024   Component Date Value Ref Range Status    Ventricular Rate 06/03/2024 78  BPM Final    Atrial Rate 06/03/2024 78  BPM Final    OK Interval 06/03/2024 186  ms Final    QRS Duration 06/03/2024 90  ms Final    QT Interval 06/03/2024 380  ms Final    QTC Calculation(Bazett) 06/03/2024 433  ms Final    P Axis 06/03/2024 72  degrees Final    R Axis 06/03/2024 74  degrees Final    T [place] 06/03/2024 64  degrees Final    QRS Count 06/03/2024 13  beats Final    Q Onset 06/03/2024 218  ms Final    P Onset 06/03/2024 125  ms Final    P Offset 06/03/2024 185  ms Final    T Offset 06/03/2024 408  ms Final    QTC Fredericia 06/03/2024 414  ms Final   Lab on 05/17/2024   Component Date Value Ref Range Status    Metanephrines, Urine Interpretation 05/17/2024 See Note   Final    Comment: TEST  INFORMATION: Metanephrines Fractionated, Urine    Smaller increases in metanephrine and/or normetanephrine   concentrations (less than two times the upper reference limit)   usually are the result of physiological stimuli, drugs, or   improper specimen collection. Essential hypertension is often   associated with slight elevations (metanephrine less than 400 ug/d   and normetanephrine less than 900 ug/d). Elevated concentrations   may be due to intense physical activity, life-threatening illness,   and drug interferences.     Significant elevation of one or both metanephrines (three or more   times the upper reference limit) is associated with an increased   probability of a neuroendocrine tumor.    Access complete set of age- and/or gender-specific reference   intervals for this test in the [facility] Test Directory   ([employer]).    This test was developed and its performance characteristics   determined by [facility]. It has not been cleared or   approved by the                            US Food and Drug Administration. This test was   performed in a CLIA certified laboratory and is intended for   clinical purposes.    Metanephrines, Urine 05/17/2024 65  ug/L Final    Metanephrines, 24H Urine 05/17/2024 39  36 - 229 ug/d Final    Metanephrine, Urine - ratio to CRT 05/17/2024 55  0 - 300 ug/g CRT Final    Normetanephrine, Urine 05/17/2024 323  ug/L Final    Normetanephrine, 24H Urine 05/17/2024 194  95 - 650 ug/d Final    Normetanephrine, Urine - ratio to * 05/17/2024 271  0 - 400 ug/g CRT Final    Creatinine, Urine - per volume 05/17/2024 119  mg/dL Final    Creatinine, 24H Ur 05/17/2024 714  400 - 1300 mg/d Final    Performed By: [facility]  [address] [place] [address]  : Trevor Canchola MD, PhD  CLIA Number: [id number]    Total Volume 05/17/2024 600  mL Final    Collection Interval, Ur 05/17/2024 24  hr Final      Per 24h calculations are provided to  aid interpretation for   collections with a duration of 24 hours and an average daily urine   volume. For specimens with notable deviations in collection time   or volume, ratios of analytes to a corresponding urine creatinine   concentration may assist in result interpretation.   Lab on 05/13/2024   Component Date Value Ref Range Status    Normetanephrine 05/13/2024 0.71  0.00 - 0.89 nmol/L Final    Metanephrine 05/13/2024 0.13  0.00 - 0.49 nmol/L Final    Metanephrines Interpretation 05/13/2024 See Note   Final    Comment: INTERPRETIVE INFORMATION: Metanephrines, Plasma (Free)    This test is useful in the detection of pheochromocytoma, a rare   neuroendocrine tumor. The majority of patients with   pheochromocytoma have a plasma normetanephrine concentration in   excess of 2.2 nmol/L and/or a metanephrine concentration in excess   of 1.1 nmol/L. Increased concentrations of these analytes serve as   confirmation for diagnosis. Patients with essential hypertension   and plasma concentrations of normetanephrine below 0.9 nmol/L and   a metanephrine concentration below 0.5 nmol/L, can be excluded   from further testing. If clinical suspicion remains, repeat   testing or testing for metanephrines in a 24-hr. urine specimen   should be considered.    This test was developed and its performance characteristics   determined by [facility]. It has not been cleared or   approved by the [place] Food and Drug Administration. This test was   performed in a CLIA certified laboratory and is intended for   clinical                            purposes.  Performed By: [facility]  [address] [place] [address]  : Trevor Canchola MD, PhD  CLIA Number: [id number]    Vitamin E (Alpha-Tocopherol) 05/13/2024 10.6  5.5 - 18.0 mg/L Final      This test was developed and its performance characteristics   determined by [facility]. It has not been cleared or   approved by the US Food and Drug  Administration. This test was   performed in a CLIA certified laboratory and is intended for   clinical purposes.    Vitamin E (Gamma-Tocopherol) 05/13/2024 1.1  0.0 - 6.0 mg/L Final    Performed By: [facility]  [address] [place] [address]  : Trevor Canchola MD, PhD  CLIA Number: [id number]    Vitamin B6 05/13/2024 54.7  20.0 - 125.0 nmol/L Final    INTERPRETIVE INFORMATION: Vitamin B6 (Pyridoxal 5-Phosphate)    Pyridoxal 5'-phosphate measured in a specimen collected following   an 8-hour or overnight fast accurately indicates vitamin B6   nutritional status. Non-fasting specimen concentration reflects   recent vitamin intake.    This test was developed and its performance characteristics   determined by [facility]. It has not been cleared or   approved by the US Food and Drug Administration. This test was   performed in a CLIA certified laboratory and is intended for   clinical purposes.  Performed By: [facility]  [address] [place] [address]  : Trevor Canchola MD, PhD  CLIA Number: [id number]    Triiodothyronine 05/13/2024 80  60 - 200 ng/dL Final    Sedimentation Rate 05/13/2024 7  0 - 30 mm/h Final    Rheumatoid Factor 05/13/2024 <10  0 - 15 IU/mL Final    Angio Converting Enzyme 05/13/2024 56  16 - 85 U/L Final    Performed By: [facility]  [address] [place] [address]  : Trevor Canchola MD, PhD  CLIA Number: [id number]    Parietal Cell Ab 05/13/2024 1.9  0.0 - 24.9 Units Final      In the context of vitamin B12 deficiency, the presence of gastric   parietal cell antibodies (PCA) and/or intrinsic factor antibodies   in association with macrocytic anemia is considered diagnostic for   pernicious anemia (PA). However, the presence of gastric PCAs   alone is not specific for PA. Gastric PCAs may occur with   increased frequency in unaffected family members, a small   percentage of  healthy individuals, and patients with other   autoimmune diseases, such as autoimmune thyroiditis.  INTERPRETIVE INFORMATION: Gastric Parietal Cell Antibody, IgG    0.0-20.0 Units: Negative  20.1-24.9 Units: Equivocal  25.0 Units or greater: Positive  Performed By: [facility]  [address] [place] [id number]  : Trevor Canchola MD, PhD  CLIA Number: [id number]    Interpretive Comments 05/13/2024 SEE COMMENTS   Final    A negative basic paraneoplastic evaluation result does not   rule out all clinically relevant antibodies. If indicated,   a comprehensive neurological phenotype-specific   autoimmune/paraneoplastic evaluation (e.g. encephalopathy,   movement disorders, myelopathy, or axonal neuropathy)   should be considered   [email or web address]  These evaluations include screening cell-based assays   optimized for detection of recently discovered antibodies.    IFA Notes 05/13/2024 None.   Final    Amphiphysin Ab, S 05/13/2024 Negative  Negative Final       -------------------ADDITIONAL INFORMATION-------------------  This test was developed and its performance characteristics   determined by [facility] in a manner consistent with CLIA   requirements. This test has not been cleared or approved by   the U.S. Food and Drug Administration.    AGNA-1, S 05/13/2024 Negative  Negative Final       -------------------ADDITIONAL INFORMATION-------------------  This test was developed and its performance characteristics   determined by [facility] in a manner consistent with CLIA   requirements. This test has not been cleared or approved by   the U.S. Food and Drug Administration.    SUZETTE-1, S 05/13/2024 Negative  Negative Final       -------------------ADDITIONAL INFORMATION-------------------  This test was developed and its performance characteristics   determined by [facility] in a manner consistent with CLIA   requirements. This test  has not been cleared or approved by   the U.S. Food and Drug Administration.    SUZETTE-2, S 05/13/2024 Negative  Negative Final       -------------------ADDITIONAL INFORMATION-------------------  This test was developed and its performance characteristics   determined by [facility] in a manner consistent with CLIA   requirements. This test has not been cleared or approved by   the U.S. Food and Drug Administration.    SUZETTE-3, S 05/13/2024 Negative  Negative Final       -------------------ADDITIONAL INFORMATION-------------------  This test was developed and its performance characteristics   determined by [facility] in a manner consistent with CLIA   requirements. This test has not been cleared or approved by   the U.S. Food and Drug Administration.    CRMP-5-IgG, S 05/13/2024 Negative  Negative Final       -------------------ADDITIONAL INFORMATION-------------------  This test was developed and its performance characteristics   determined by [facility] in a manner consistent with CLIA   requirements. This test has not been cleared or approved by   the U.S. Food and Drug Administration.    Neuronal (V-G) K+ Channel Ab, S 05/13/2024 0.00  <=0.02 nmol/L Final       -------------------ADDITIONAL INFORMATION-------------------  This test was developed and its performance characteristics   determined by [facility] in a manner consistent with CLIA   requirements. This test has not been cleared or approved by   the U.S. Food and Drug Administration.    P/Q-Type Calcium Channel Ab 05/13/2024 0.00  <=0.02 nmol/L Final       -------------------ADDITIONAL INFORMATION-------------------  This test was developed and its performance characteristics   determined by [facility] in a manner consistent with CLIA   requirements. This test has not been cleared or approved by   the U.S. Food and Drug Administration.    PCA-1, S 05/13/2024 Negative  Negative Final       -------------------ADDITIONAL INFORMATION-------------------  This  test was developed and its performance characteristics   determined by [facility] in a manner consistent with CLIA   requirements. This test has not been cleared or approved by   the U.S. Food and Drug Administration.    PCA-2, S 05/13/2024 Negative  Negative Final       -------------------ADDITIONAL INFORMATION-------------------  This test was developed and its performance characteristics   determined by [facility] in a manner consistent with CLIA   requirements. This test has not been cleared or approved by   the U.S. Food and Drug Administration.    PCA-Tr, S 05/13/2024 Negative  Negative Final       -------------------ADDITIONAL INFORMATION-------------------  This test was developed and its performance characteristics   determined by [facility] in a manner consistent with CLIA   requirements. This test has not been cleared or approved by   the U.S. Food and Drug Administration.     Test Performed by:  [place] [address]  : Chris Mckeon M.D. Ph.D.; CLIA# [id number]    Methylmalonic Acid, S 05/13/2024 0.12  0.00 - 0.40 umol/L Final    INTERPRETIVE INFORMATION: MMA Serum/Plasma,                             Vitamin B12 Status    This test was developed and its performance characteristics   determined by [facility]. It has not been cleared or   approved by the US Food and Drug Administration. This test was   performed in a CLIA certified laboratory and is intended for   clinical purposes.  Performed By: [facility]  [address] [place], [place] [id number]  : Trevor Canchola MD, PhD  CLIA Number: [id number]    WBC 05/13/2024 6.3  4.4 - 11.3 x10*3/uL Final    nRBC 05/13/2024 0.0  0.0 - 0.0 /100 WBCs Final    RBC 05/13/2024 3.93 (L)  4.00 - 5.20 x10*6/uL Final    Hemoglobin 05/13/2024 14.3  12.0 - 16.0 g/dL Final    Hematocrit 05/13/2024 41.0  36.0 - 46.0 % Final    MCV 05/13/2024 104 (H)   80 - 100 fL Final    MCH 05/13/2024 36.4 (H)  26.0 - 34.0 pg Final    MCHC 05/13/2024 34.9  32.0 - 36.0 g/dL Final    RDW 05/13/2024 13.2  11.5 - 14.5 % Final    Platelets 05/13/2024 244  150 - 450 x10*3/uL Final    Neutrophils % 05/13/2024 57.1  40.0 - 80.0 % Final    Immature Granulocytes %, Automated 05/13/2024 0.6  0.0 - 0.9 % Final    Immature Granulocyte Count (IG) includes promyelocytes, myelocytes and metamyelocytes but does not include bands. Percent differential counts (%) should be interpreted in the context of the absolute cell counts (cells/UL).    Lymphocytes % 05/13/2024 28.6  13.0 - 44.0 % Final    Monocytes % 05/13/2024 11.3  2.0 - 10.0 % Final    Eosinophils % 05/13/2024 1.4  0.0 - 6.0 % Final    Basophils % 05/13/2024 1.0  0.0 - 2.0 % Final    Neutrophils Absolute 05/13/2024 3.59  1.60 - 5.50 x10*3/uL Final    Percent differential counts (%) should be interpreted in the context of the absolute cell counts (cells/uL).    Immature Granulocytes Absolute, Au* 05/13/2024 0.04  0.00 - 0.50 x10*3/uL Final    Lymphocytes Absolute 05/13/2024 1.80  0.80 - 3.00 x10*3/uL Final    Monocytes Absolute 05/13/2024 0.71  0.05 - 0.80 x10*3/uL Final    Eosinophils Absolute 05/13/2024 0.09  0.00 - 0.40 x10*3/uL Final    Basophils Absolute 05/13/2024 0.06  0.00 - 0.10 x10*3/uL Final    Epinephrine 05/13/2024 <15  0 - 62 pg/mL Final    Norepinephrine 05/13/2024 644  0 - 874 pg/mL Final    Dopamine 05/13/2024 <30  0 - 48 pg/mL Final    Glucose 05/13/2024 105 (H)  74 - 99 mg/dL Final    Sodium 05/13/2024 139  136 - 145 mmol/L Final    Potassium 05/13/2024 4.5  3.5 - 5.3 mmol/L Final    Chloride 05/13/2024 105  98 - 107 mmol/L Final    Bicarbonate 05/13/2024 26  21 - 32 mmol/L Final    Anion Gap 05/13/2024 13  10 - 20 mmol/L Final    Urea Nitrogen 05/13/2024 32 (H)  6 - 23 mg/dL Final    Creatinine 05/13/2024 1.57 (H)  0.50 - 1.05 mg/dL Final    eGFR 05/13/2024 33 (L)  >60 mL/min/1.73m*2 Final    Calculations of estimated  GFR are performed using the 2021 CKD-EPI Study Refit equation without the race variable for the IDMS-Traceable creatinine methods.  https://jasn.asnjournals.org/content/early/2021/09/22/ASN.5623340857    Calcium 05/13/2024 9.9  8.6 - 10.6 mg/dL Final    Thyroid Peroxidase (TPO) Antibody 05/13/2024 52  <=60 IU/mL Final    Thyroglobulin 05/13/2024 26.4  1.3 - 31.8 ng/mL Final    INTERPRETIVE INFORMATION: Thyroglobulin, Serum or Plasma    Specimens negative for thyroglobulin antibodies (TgAb) are tested   for thyroglobulin (Tg) by chemiluminescent immunoassay (JAZMYN) using   the [employer] Access DxI method. Specimens with TgAb results   above the upper reference limit are tested for Tg by   high-performance liquid chromatography-tandem mass spectrometry   (LC-MS/MS). Results obtained with different test methods or kits   cannot be used interchangeably. Tg results, regardless of   concentration, should not be interpreted as absolute evidence for   the presence or absence of papillary or follicular thyroid cancer.   Tg testing is not recommended for use as a screening procedure to   detect the presence of thyroid cancer in the general population.    Thyroglobulin LC-MS/MS 05/13/2024 Not Applicable  1.3 - 31.8 ng/mL Final    INTERPRETIVE INFORMATION: Thyroglobulin by LC-MS/MS, Serum/Plasma    Lower limit of detection for Thyroglobulin by LC-MS/MS is 0.5   ng/mL.    This test was developed and its performance characteristics   determined by [facility]. It has not been cleared or   approved by the US Food and Drug Administration. This test was   performed in a CLIA certified laboratory and is intended for   clinical purposes.  Performed By: [facility]  [address] [place] [id number]  : Trevor Canchola MD, PhD  CLIA Number: [id number]    Anti-Thyroglobulin AB 05/13/2024 <0.9  0.0 - 4.0 IU/mL Final    INTERPRETIVE INFORMATION: Thyroglobulin Antibody                                     A value of 4.0 IU/mL or less indicates a negative result for   thyroglobulin antibodies.    The Thyroglobulin Antibody assay is being performed using the   [employer] Access DxI method.    Total Protein 05/13/2024 6.6  6.4 - 8.2 g/dL Final    Albumin 05/13/2024 3.9  3.4 - 5.0 g/dL Final    Alpha 1 Globulin 05/13/2024 0.3  0.2 - 0.6 g/dL Final    Alpha 2 Globulin 05/13/2024 0.6  0.4 - 1.1 g/dL Final    Beta Globulin 05/13/2024 0.9  0.5 - 1.2 g/dL Final    Gamma 05/13/2024 0.9  0.5 - 1.4 g/dL Final    Protein Electrophoresis Comment 05/13/2024 Normal.   Final    Path Review - Serum Protein Electr* 05/13/2024 Reviewed and approved by DEE ROCA on 5/15/24 at 8:10 AM.      Final    CATIA 05/13/2024 Negative  Negative Final    The Antinuclear Antibody (CATIA) test was performed using  indirect immunofluorescence assay with HEp-2 cells slide.    Anti-SM 05/13/2024 <0.2  <1.0 AI Final    < 1.0 = NEGATIVE  >=1.0 = POSITIVE    Anti-RNP 05/13/2024 <0.2  <1.0 AI Final    < 1.0 = NEGATIVE  >=1.0 = POSITIVE    Anti-SM/RNP 05/13/2024 <0.2  <1.0 AI Final    < 1.0 = NEGATIVE  >=1.0 = POSITIVE    Anti-SSA 05/13/2024 <0.2  <1.0 AI Final    < 1.0 = NEGATIVE  >=1.0 = POSITIVE    Anti-SSB 05/13/2024 <0.2  <1.0 AI Final    < 1.0 = NEGATIVE  >=1.0 = POSITIVE    Anti-SCL-70 05/13/2024 <0.2  <1.0 AI Final    < 1.0 = NEGATIVE  >=1.0 = POSITIVE    Anti-MEGAN-1 IgG 05/13/2024 <0.2  <1.0 AI Final    < 1.0 = NEGATIVE  >=1.0 = POSITIVE    Anti-Chromatin 05/13/2024 <0.2  <1.0 AI Final    < 1.0 = NEGATIVE  >=1.0 = POSITIVE    Anti-Centromere 05/13/2024 <0.2  <1.0 AI Final    < 1.0 = NEGATIVE  >=1.0 = POSITIVE    ANTI-RIBOSOMAL P 05/13/2024 <0.2  <1.0 AI Final    < 1.0 = NEGATIVE  >=1.0 = POSITIVE    Anti-DNA (DS) 05/13/2024 <1.0  <5.0 IU/mL Final    NEGATIVE: <= 4 IU/ML  EQUIVOCAL: 5- 9 IU/ML  POSITIVE: >=10 IU/ML    Bill Only Thyroglobulin 05/13/2024 Billed   Final    Performed By: [facility]  [address] [place]  [address]  : Trevor Canchola MD, PhD  CLIA Number: [id number]   Lab on 12/11/2023   Component Date Value Ref Range Status    Albumin, Urine Random 12/11/2023 13.8  Not established mg/L Final    Creatinine, Urine Random 12/11/2023 103.6  20.0 - 320.0 mg/dL Final    Albumin/Creatinine Ratio 12/11/2023 13.3  <30.0 ug/mg Creat Final    WBC 12/11/2023 7.6  4.4 - 11.3 x10*3/uL Final    nRBC 12/11/2023 0.0  0.0 - 0.0 /100 WBCs Final    RBC 12/11/2023 3.78 (L)  4.00 - 5.20 x10*6/uL Final    Hemoglobin 12/11/2023 14.0  12.0 - 16.0 g/dL Final    Hematocrit 12/11/2023 40.4  36.0 - 46.0 % Final    MCV 12/11/2023 107 (H)  80 - 100 fL Final    MCH 12/11/2023 37.0 (H)  26.0 - 34.0 pg Final    MCHC 12/11/2023 34.7  32.0 - 36.0 g/dL Final    RDW 12/11/2023 12.9  11.5 - 14.5 % Final    Platelets 12/11/2023 359  150 - 450 x10*3/uL Final    Ferritin 12/11/2023 162 (H)  8 - 150 ng/mL Final    Iron 12/11/2023 73  35 - 150 ug/dL Final    UIBC 12/11/2023 273  110 - 370 ug/dL Final    TIBC 12/11/2023 346  240 - 445 ug/dL Final    % Saturation 12/11/2023 21 (L)  25 - 45 % Final    Parathyroid Hormone, Intact 12/11/2023 93.4 (H)  18.5 - 88.0 pg/mL Final    Glucose 12/11/2023 144 (H)  74 - 99 mg/dL Final    Sodium 12/11/2023 142  136 - 145 mmol/L Final    Potassium 12/11/2023 4.5  3.5 - 5.3 mmol/L Final    Chloride 12/11/2023 105  98 - 107 mmol/L Final    Bicarbonate 12/11/2023 26  21 - 32 mmol/L Final    Anion Gap 12/11/2023 16  10 - 20 mmol/L Final    Urea Nitrogen 12/11/2023 17  6 - 23 mg/dL Final    Creatinine 12/11/2023 1.37 (H)  0.50 - 1.05 mg/dL Final    eGFR 12/11/2023 39 (L)  >60 mL/min/1.73m*2 Final    Calculations of estimated GFR are performed using the 2021 CKD-EPI Study Refit equation without the race variable for the IDMS-Traceable creatinine methods.  https://jasn.asnjournals.org/content/early/2021/09/22/ASN.6154133262    Calcium 12/11/2023 9.6  8.6 - 10.6 mg/dL Final    Phosphorus 12/11/2023 2.7  2.5 - 4.9  mg/dL Final    The performance characteristics of phosphorus testing in heparinized plasma have been validated by the individual  laboratory site where testing is performed. Testing on heparinized plasma is not approved by the FDA; however, such approval is not necessary.    Albumin 12/11/2023 4.0  3.4 - 5.0 g/dL Final   Office Visit on 10/25/2023   Component Date Value Ref Range Status    Ventricular Rate 10/30/2023 70  BPM Final    Atrial Rate 10/30/2023 70  BPM Final    CA Interval 10/30/2023 156  ms Final    QRS Duration 10/30/2023 84  ms Final    QT Interval 10/30/2023 374  ms Final    QTC Calculation(Bazett) 10/30/2023 403  ms Final    P Axis 10/30/2023 73  degrees Final    R Axis 10/30/2023 82  degrees Final    T Axis 10/30/2023 64  degrees Final    QRS Count 10/30/2023 12  beats Final    Q Onset 10/30/2023 221  ms Final    P Onset 10/30/2023 143  ms Final    P Offset 10/30/2023 195  ms Final    T Offset 10/30/2023 408  ms Final    QTC Fredericia 10/30/2023 393  ms Final         Diagnostic Test Results and Labs:  - Echocardiogram (May 10, 2024):    - Normal left ventricular ejection fraction.    - Mild aortic valve regurgitation.  - Laboratory Tests:    - Metanephrine: Normal    - Vitamin E: Normal    - Vitamin B6: Normal    - Triiodothyronine: Normal    - Erythrocyte Sedimentation Rate (ESR): Normal    - Rheumatoid Factor: Normal    - Phenotypic Atrophoresis: Normal    - Angiotensin-Converting Enzyme: Normal    - Anti-Parietal Cells: Normal    - Catecholamines: Normal    - White Blood Cells: Elevated    - Red Blood Cells: Mildly enlarged    - Blood Sugar: Elevated    - Kidney Function: Compromised    - Vitamin B12 (May 10, 2023): 1,287 (Normal)    - Methylmalonic Acid: Normal    - Folate: Pending additional testing    - Glucose Tolerance Test: Ordered      Social history:  She is , has a supportive , 3 sons and one daughter.  She denies smoking, ETOH and substance use, resides in [facility]  [facility], presents with a history of health concerns. She has been engaged in hobbies such as puzzles and reading and participates in physical activities like golf, and Yoga albeit to a lesser extent due to her fainting spells.     She was evaluate by the following specialists:    General Neurology.  Dr. Miranda, who performed an extensive testing, including autonomic nervous system tests, which identified a moderate autonomic neuropathy.     Neurology, Dr Chatman,   Movement disorder specialist   She has 2-year history of mild cognitive decline, personality changes with apathy and decreased emotional response, recurrent syncope and orthostasis.  On neurologic exam she has marked hypomimia, mild upper extremity bradykinesia and mild parkinsonian gait.  Unifying diagnosis could be early prodromal Parkinson's disease, however at this point she does not meet diagnostic criteria for PD.  Regarding her orthostatic hypotension, this could be related to an underlying synucleopathy however cardiac causes and other autonomic neuropathies are also possible.       Nephrology; Dr. Shweta Garcia, who had the following impression:  #CKD stage III initially was worsening currently stable, and has been monitoring her hypertension on Losartan 50 mg and osteoporosis on Vitamin D is low-continue D3 ( was on Alendronate 70 mg weekly-currently on hold)     Cardiology Dr. Abel Chong, who reported:  Patient had one episode of syncope in July during yoga lasting 2-3 seconds, no warning signs. She had a near syncopal episode about 3 weeks ago during yoga. Event monitor revealed episodes of atrial flutter (longest lasting ~ 7 minutes) and atrial tach with rates up to 182 bpm. She is currently on Xarelto (hx of PE). CT calcium score of 84 (RCA). Will initiate Flecainide 50mg bid.     She had a cardiac loop recorder placement .     Diagnostic Test Results:  - Autonomic nervous system testing (January 10, 2024) which  reported:  This is an abnormal cardiovascular autonomic test panel due to the presence of a low VR, abnormal BP responses to the VM, mild orthostatic hypotension and moderate supine hypertension without cardiac sympathetic failure. While there is no evidence of a significant cardiovagal abnormality, there are findings suggestive of a mild peripheral vasomotor adrenergic abnormality.   Moreover, the QSART findings are consistent with a postganglionic sympathetic sudomotor abnormality like that seen in a non-length dependent autonomic/small fiber neuropathy, as sparing of the foot is not typical of a distal axonopathy.     - Carotid Doppler: Heterogeneous calcified plaques in right and left internal carotid arteries, with no narrowing greater than 50%..    - Echocardiogram (2021): Performed which reported   1. The left ventricular systolic function is normal with a 60-65% estimated ejection fraction.   2. Spectral Doppler shows an impaired relaxation pattern of left ventricular diastolic filling.   3. The right ventricle is mildly enlarged. There is normal right ventricular global systolic function.   4. There is mild tricuspid regurgitation.   5. Moderately elevated pulmonary artery pressure, estimated RVSP 50mmHg.    Last Cardiology Tests:  ECG (10/25/23):  Sinus rhythm, HR 70 bpm.        Echo (2021):     CONCLUSIONS:   1. The left ventricular systolic function is normal with a 60-65% estimated ejection fraction.   2. Spectral Doppler shows an impaired relaxation pattern of left ventricular diastolic filling.   3. The right ventricle is mildly enlarged. There is normal right ventricular global systolic function.   4. There is mild tricuspid regurgitation.   5. Moderately elevated pulmonary artery pressure, estimated RVSP 50mmHg.     Event monitor (11 days - 09/2023):  Patient had a min HR of 52 bpm, max HR of 182 bpm, and avg HR of 83 bpm. Predominant underlying rhythm was Sinus Rhythm. 115 Supraventricular  Tachycardia runs occurred, the run with the fastest interval lasting 10 beats with a max rate of 182 bpm, the longest lasting 15 beats with an avg rate of 118 bpm. Some episodes of Supraventricular Tachycardia may be possible Atrial Tachycardia with variable block. Atrial Flutter occurred (<1% burden), ranging from  bpm (avg of 133 bpm), the longest lasting 6 mins 57 secs with an avg rate of 143 bpm. Isolated SVEs were rare (<1.0%), SVE Couplets were rare (<1.0%), and SVE Triplets were rare (<1.0%). Isolated VEs were rare (<1.0%), VE Couplets were rare (<1.0%), and no VE Triplets were present. Ventricular Bigeminy was present.     CT cardiac scoring (10/25/2023):  IMPRESSION:  1. Coronary artery calcium score of 84.8*.      *Coronary Artery  Agatston score      Score  risk  Very low 1-99  Mildly increased 100-299  Moderately increased >300  Moderate to severely increased >800      - CT Scan of the brain (July 2023): Showed no significant atrophy, appearing normal.    - Implanted loop monitor: Present, indicating monitoring of heart rhythm.       Patient Active Problem List   Diagnosis    Abnormal mammogram    Acute pulmonary embolism (Multi)    Pulmonary embolus    Behavioral change    Benign essential hypertension    Cogwheel rigidity    Colon polyps    Elevated serum creatinine    Exposure to COVID-19 virus    Extrapyramidal movement disorder    Hyperlipidemia    Idiopathic osteoporosis    Lung nodule    Mild cognitive impairment    Presence of IVC filter    Stage 3b chronic kidney disease (Multi)    Vitamin B12 deficiency    Vitamin D deficiency    Syncope    Leg weakness    Atrial flutter, unspecified type (Multi)    SVT (supraventricular tachycardia) (CMS[facility])             Results:         Lab Results   Component Value Date    ZHKRSPLP99 1287 (H) 05/10/2023       IRON STUDIES:   Lab Results   Component Value Date    IRON 143 06/17/2024    TIBC 375 06/17/2024    FERRITIN 91 06/17/2024   His lab workup  revealed    Lab Results   Component Value Date    SPEP Normal. 05/13/2024     CBC:   Lab Results   Component Value Date    WBC 6.9 06/17/2024    HGB 14.5 06/17/2024    HCT 43.7 06/17/2024     06/17/2024     BMP:   Lab Results   Component Value Date     06/17/2024    K 5.0 06/17/2024     06/17/2024    CO2 26 06/17/2024    BUN 33 (H) 06/17/2024    CREATININE 1.61 (H) 06/17/2024    CALCIUM 9.9 06/17/2024    MG 2.22 06/17/2024    PHOS 3.5 06/17/2024     LFT:   Lab Results   Component Value Date    ALKPHOS 57 07/31/2023    BILITOT 0.6 07/31/2023    PROT 6.6 05/13/2024    ALBUMIN 3.9 06/17/2024    ALT 9 07/31/2023    AST 16 07/31/2023             Problems Assessed/Relevant:  Problem List Items Addressed This Visit    None    Past Medical History:   Diagnosis Date    Encounter for gynecological examination (general) (routine) without abnormal findings     Encounter for routine pelvic examination    Other conditions influencing health status     Screening for malignant neoplasms, colon     Past Surgical History:   Procedure Laterality Date    CARDIAC ELECTROPHYSIOLOGY PROCEDURE Left 11/30/2023    Procedure: Loop Insertion;  Surgeon: Abel Chong MD;  Location: [facility] Cardiac Cath Lab;  Service: Electrophysiology;  Laterality: Left;    OTHER SURGICAL HISTORY  05/31/2013    Surgery Excision Of Parotid Tumor/Gland    OTHER SURGICAL HISTORY  05/31/2013    Spinal Diskectomy Lumbar    TONSILLECTOMY  05/31/2013    Tonsillectomy     Family History   Problem Relation Name Age of Onset    Colon cancer Mother      Other (Old Age) Father       Social History     Tobacco Use    Smoking status: Never    Smokeless tobacco: Never   Substance Use Topics    Alcohol use: Yes     Alcohol/week: 7.0 standard drinks of alcohol     Types: 7 Glasses of wine per week      Allergies   Allergen Reactions    Sulfa (Sulfonamide Antibiotics) Hives and Rash              Physical Exam:      /74 (BP Location: Left arm, Patient  Position: Sitting)   Pulse 81   Cranial nerves are intact.   Physical Examination Findings:  - Neurological: No jaw tremor observed, minimal postural tremor noted, sensory ataxia not present.  - Musculoskeletal: Subcorporeal rigidity on the left, no corporeal rigidity on the right, anterior striding in the upper part of the body, decreased swinging of the arms, but balance is maintained.             Impression/Plan:   Paroxysmal spells of fainting for the last few years described as transient few seconds with associated orthostatic hypotension with physical exam consistent with masked face, hypomimia, left arm cogwheel rigidity, decreased swinging of the left arm while walking, and bradykinesia.    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      Antihypertensive medications and dehydration may play a role in her dizziness as well.       The patient has been doing relatively well in the last few months as she had no syncopal episodes since March 2024    Orthostatic Hypotension and Autonomic Dysfunction    - Plan:    - Continue monitoring blood pressure and heart rate regularly.    - Maintain use of compression stockings and abdominal binder during the day.    - No need for Midodrine at this time, as she is stable without medication.    - Continue hydration with 2-3 liters (120 ounces) of water daily.    - Follow a Mediterranean diet, focusing on  protein and fat intake, and limiting heavy carbohydrates.    - Increase salt intake if tolerated, while monitoring for high blood pressure.      Vasovagal Response Secondary to Vomiting    - Plan:    - Investigate the cause of vomiting to prevent future episodes.    - Regularly monitor blood pressure and heart rate.    - Continue wearing compression stockings.    - Prescribe an abdominal binder and instruct her to wear it during the day, except at night.         Physical Therapy and Balance    - Plan:    - Continue home-based physical therapy as recommended by the physical therapist.    - Consult with the physical therapist regarding the safety of chair yoga and any additional upper body exercises.    Cognitive Function    - Plan:    - Focus on addressing kidney function, hyperglycemia and hypertension management.     - Encourage social, physical, and mental activity to slow down cognitive decline.     - Referral to Dr. Domenico Sumner.         Follow-up    - Plan:    - Schedule a follow-up appointment in six months (December), or sooner as needed.    - If any significant changes in her condition occur, consider an earlier follow-up and referral to relevant specialists as needed.     Case was discussed in details with , and sons.       Reviewed and approved by ABIGAIL CHAPIN on 10/23/24 at 11:52 AM.    I personally spent [40] minutes on the day of the visit completing the review of the medical record and outside records, obtaining history and performing an appropriate physical exam, patient care, counseling and education, placing orders, independently reviewing results, communicating with the patient/family, coordinating care and performing appropriate clinical documentation.    Abigail Chapin M.D.

## 2024-10-29 PROBLEM — R93.89 ABNORMAL COMPUTED TOMOGRAPHY SCAN: Status: ACTIVE | Noted: 2024-10-29

## 2024-10-29 PROBLEM — N17.9 ACUTE RENAL FAILURE (CMS-HCC): Status: ACTIVE | Noted: 2024-10-29

## 2024-10-29 PROBLEM — N39.0 URINARY TRACT BACTERIAL INFECTIONS: Status: RESOLVED | Noted: 2024-10-29 | Resolved: 2024-10-29

## 2024-10-29 PROBLEM — I95.9 HYPOTENSION: Status: ACTIVE | Noted: 2024-10-29

## 2024-10-29 PROBLEM — U07.1 DISEASE DUE TO SEVERE ACUTE RESPIRATORY SYNDROME CORONAVIRUS 2 (SARS-COV-2): Status: RESOLVED | Noted: 2024-10-29 | Resolved: 2024-10-29

## 2024-10-29 PROBLEM — Z20.822 EXPOSURE TO COVID-19 VIRUS: Status: RESOLVED | Noted: 2023-05-15 | Resolved: 2024-10-29

## 2024-10-29 PROBLEM — J18.9 PNEUMONIA: Status: RESOLVED | Noted: 2024-10-29 | Resolved: 2024-10-29

## 2024-10-29 PROBLEM — A49.9 URINARY TRACT BACTERIAL INFECTIONS: Status: RESOLVED | Noted: 2024-10-29 | Resolved: 2024-10-29

## 2024-10-29 PROBLEM — E86.0 DEHYDRATION: Status: ACTIVE | Noted: 2024-10-29

## 2024-10-29 RX ORDER — NEOMYCIN SULFATE, POLYMYXIN B SULFATE, AND DEXAMETHASONE 3.5; 10000; 1 MG/G; [USP'U]/G; MG/G
OINTMENT OPHTHALMIC
COMMUNITY
Start: 2024-09-17

## 2024-10-30 ENCOUNTER — HOSPITAL ENCOUNTER (OUTPATIENT)
Dept: RADIOLOGY | Facility: CLINIC | Age: 84
Discharge: HOME | End: 2024-10-30
Payer: MEDICARE

## 2024-10-30 ENCOUNTER — APPOINTMENT (OUTPATIENT)
Dept: RADIOLOGY | Facility: CLINIC | Age: 84
End: 2024-10-30
Payer: MEDICARE

## 2024-10-30 ENCOUNTER — HOSPITAL ENCOUNTER (OUTPATIENT)
Dept: CARDIOLOGY | Facility: CLINIC | Age: 84
Discharge: HOME | End: 2024-10-30
Payer: MEDICARE

## 2024-10-30 ENCOUNTER — APPOINTMENT (OUTPATIENT)
Dept: NEUROLOGY | Facility: CLINIC | Age: 84
End: 2024-10-30
Payer: MEDICARE

## 2024-10-30 DIAGNOSIS — R92.8 ABNORMAL MAMMOGRAM: ICD-10-CM

## 2024-10-30 DIAGNOSIS — Z45.09 ENCOUNTER FOR LOOP RECORDER CHECK: ICD-10-CM

## 2024-10-30 DIAGNOSIS — R55 SYNCOPE AND COLLAPSE: ICD-10-CM

## 2024-10-30 PROCEDURE — 93298 REM INTERROG DEV EVAL SCRMS: CPT

## 2024-10-30 PROCEDURE — 76642 ULTRASOUND BREAST LIMITED: CPT | Mod: BILATERAL PROCEDURE | Performed by: RADIOLOGY

## 2024-10-30 PROCEDURE — 76642 ULTRASOUND BREAST LIMITED: CPT | Mod: 50

## 2024-10-30 PROCEDURE — 76982 USE 1ST TARGET LESION: CPT | Mod: 50,RT

## 2024-10-31 ENCOUNTER — LAB (OUTPATIENT)
Dept: LAB | Facility: LAB | Age: 84
End: 2024-10-31
Payer: MEDICARE

## 2024-10-31 ENCOUNTER — APPOINTMENT (OUTPATIENT)
Dept: RADIOLOGY | Facility: CLINIC | Age: 84
End: 2024-10-31
Payer: MEDICARE

## 2024-10-31 ENCOUNTER — APPOINTMENT (OUTPATIENT)
Dept: PRIMARY CARE | Facility: CLINIC | Age: 84
End: 2024-10-31
Payer: MEDICARE

## 2024-10-31 VITALS
DIASTOLIC BLOOD PRESSURE: 70 MMHG | HEART RATE: 81 BPM | HEIGHT: 66 IN | SYSTOLIC BLOOD PRESSURE: 118 MMHG | WEIGHT: 152.6 LBS | BODY MASS INDEX: 24.53 KG/M2 | OXYGEN SATURATION: 98 %

## 2024-10-31 DIAGNOSIS — R73.09 BLOOD GLUCOSE ABNORMAL: ICD-10-CM

## 2024-10-31 DIAGNOSIS — R55 SYNCOPE, UNSPECIFIED SYNCOPE TYPE: ICD-10-CM

## 2024-10-31 DIAGNOSIS — I10 BENIGN ESSENTIAL HYPERTENSION: ICD-10-CM

## 2024-10-31 DIAGNOSIS — Z00.00 ROUTINE ADULT HEALTH MAINTENANCE: ICD-10-CM

## 2024-10-31 DIAGNOSIS — G31.84 MILD COGNITIVE IMPAIRMENT: ICD-10-CM

## 2024-10-31 DIAGNOSIS — Z00.00 ROUTINE ADULT HEALTH MAINTENANCE: Primary | ICD-10-CM

## 2024-10-31 DIAGNOSIS — N18.32 STAGE 3B CHRONIC KIDNEY DISEASE (MULTI): ICD-10-CM

## 2024-10-31 DIAGNOSIS — I48.92 ATRIAL FLUTTER, UNSPECIFIED TYPE (MULTI): ICD-10-CM

## 2024-10-31 PROBLEM — I26.99 ACUTE PULMONARY EMBOLISM (MULTI): Status: RESOLVED | Noted: 2021-06-23 | Resolved: 2024-10-31

## 2024-10-31 LAB
ALBUMIN SERPL BCP-MCNC: 3.8 G/DL (ref 3.4–5)
ALP SERPL-CCNC: 59 U/L (ref 33–136)
ALT SERPL W P-5'-P-CCNC: 8 U/L (ref 7–45)
ANION GAP SERPL CALC-SCNC: 13 MMOL/L (ref 10–20)
AST SERPL W P-5'-P-CCNC: 14 U/L (ref 9–39)
BASOPHILS # BLD AUTO: 0.05 X10*3/UL (ref 0–0.1)
BASOPHILS NFR BLD AUTO: 0.8 %
BILIRUB SERPL-MCNC: 0.7 MG/DL (ref 0–1.2)
BUN SERPL-MCNC: 23 MG/DL (ref 6–23)
CALCIUM SERPL-MCNC: 9.5 MG/DL (ref 8.6–10.3)
CHLORIDE SERPL-SCNC: 104 MMOL/L (ref 98–107)
CHOLEST SERPL-MCNC: 234 MG/DL (ref 0–199)
CHOLESTEROL/HDL RATIO: 2.4
CO2 SERPL-SCNC: 28 MMOL/L (ref 21–32)
CREAT SERPL-MCNC: 1.56 MG/DL (ref 0.5–1.05)
EGFRCR SERPLBLD CKD-EPI 2021: 33 ML/MIN/1.73M*2
EOSINOPHIL # BLD AUTO: 0.08 X10*3/UL (ref 0–0.4)
EOSINOPHIL NFR BLD AUTO: 1.3 %
ERYTHROCYTE [DISTWIDTH] IN BLOOD BY AUTOMATED COUNT: 12.9 % (ref 11.5–14.5)
GLUCOSE SERPL-MCNC: 111 MG/DL (ref 74–99)
HCT VFR BLD AUTO: 44.4 % (ref 36–46)
HDLC SERPL-MCNC: 95.9 MG/DL
HGB BLD-MCNC: 14.8 G/DL (ref 12–16)
IMM GRANULOCYTES # BLD AUTO: 0.02 X10*3/UL (ref 0–0.5)
IMM GRANULOCYTES NFR BLD AUTO: 0.3 % (ref 0–0.9)
LYMPHOCYTES # BLD AUTO: 1.82 X10*3/UL (ref 0.8–3)
LYMPHOCYTES NFR BLD AUTO: 29.9 %
MAGNESIUM SERPL-MCNC: 2.25 MG/DL (ref 1.6–2.4)
MCH RBC QN AUTO: 34.6 PG (ref 26–34)
MCHC RBC AUTO-ENTMCNC: 33.3 G/DL (ref 32–36)
MCV RBC AUTO: 104 FL (ref 80–100)
MONOCYTES # BLD AUTO: 0.74 X10*3/UL (ref 0.05–0.8)
MONOCYTES NFR BLD AUTO: 12.2 %
NEUTROPHILS # BLD AUTO: 3.38 X10*3/UL (ref 1.6–5.5)
NEUTROPHILS NFR BLD AUTO: 55.5 %
NON-HDL CHOLESTEROL: 138 MG/DL (ref 0–149)
NRBC BLD-RTO: 0 /100 WBCS (ref 0–0)
PLATELET # BLD AUTO: 258 X10*3/UL (ref 150–450)
POTASSIUM SERPL-SCNC: 4.5 MMOL/L (ref 3.5–5.3)
PROT SERPL-MCNC: 6.3 G/DL (ref 6.4–8.2)
RBC # BLD AUTO: 4.28 X10*6/UL (ref 4–5.2)
SODIUM SERPL-SCNC: 140 MMOL/L (ref 136–145)
TSH SERPL-ACNC: 1.72 MIU/L (ref 0.44–3.98)
WBC # BLD AUTO: 6.1 X10*3/UL (ref 4.4–11.3)

## 2024-10-31 PROCEDURE — 36415 COLL VENOUS BLD VENIPUNCTURE: CPT

## 2024-10-31 PROCEDURE — 85025 COMPLETE CBC W/AUTO DIFF WBC: CPT

## 2024-10-31 PROCEDURE — 82607 VITAMIN B-12: CPT

## 2024-10-31 PROCEDURE — 83735 ASSAY OF MAGNESIUM: CPT

## 2024-10-31 PROCEDURE — 83036 HEMOGLOBIN GLYCOSYLATED A1C: CPT

## 2024-10-31 PROCEDURE — 1123F ACP DISCUSS/DSCN MKR DOCD: CPT | Performed by: NURSE PRACTITIONER

## 2024-10-31 PROCEDURE — 1158F ADVNC CARE PLAN TLK DOCD: CPT | Performed by: NURSE PRACTITIONER

## 2024-10-31 PROCEDURE — 80053 COMPREHEN METABOLIC PANEL: CPT

## 2024-10-31 PROCEDURE — 99214 OFFICE O/P EST MOD 30 MIN: CPT | Performed by: NURSE PRACTITIONER

## 2024-10-31 PROCEDURE — 84443 ASSAY THYROID STIM HORMONE: CPT

## 2024-10-31 PROCEDURE — 1160F RVW MEDS BY RX/DR IN RCRD: CPT | Performed by: NURSE PRACTITIONER

## 2024-10-31 PROCEDURE — 82465 ASSAY BLD/SERUM CHOLESTEROL: CPT

## 2024-10-31 PROCEDURE — G2211 COMPLEX E/M VISIT ADD ON: HCPCS | Performed by: NURSE PRACTITIONER

## 2024-10-31 PROCEDURE — 3074F SYST BP LT 130 MM HG: CPT | Performed by: NURSE PRACTITIONER

## 2024-10-31 PROCEDURE — 83718 ASSAY OF LIPOPROTEIN: CPT

## 2024-10-31 PROCEDURE — 1159F MED LIST DOCD IN RCRD: CPT | Performed by: NURSE PRACTITIONER

## 2024-10-31 PROCEDURE — 3078F DIAST BP <80 MM HG: CPT | Performed by: NURSE PRACTITIONER

## 2024-10-31 ASSESSMENT — ENCOUNTER SYMPTOMS
EYE PAIN: 0
JOINT SWELLING: 0
BACK PAIN: 0
NAUSEA: 0
SEIZURES: 0
MYALGIAS: 0
DIARRHEA: 0
CONSTIPATION: 0
CHILLS: 0
ABDOMINAL DISTENTION: 0
DIZZINESS: 0
COUGH: 0
PALPITATIONS: 0
WOUND: 0
TROUBLE SWALLOWING: 0
WHEEZING: 0
FATIGUE: 0
HEADACHES: 0
BRUISES/BLEEDS EASILY: 0
FEVER: 0
DIFFICULTY URINATING: 0
SHORTNESS OF BREATH: 0
WEAKNESS: 0
ABDOMINAL PAIN: 0
COLOR CHANGE: 0
ADENOPATHY: 0

## 2024-10-31 NOTE — PROGRESS NOTES
Subjective   Patient ID: Lorie Ding is a 83 y.o. female who presents for Follow-up (Med review & mammogram).    Patient seen today in order to establish primary care.  Present for today's assessment is her .  Patient is a poor historian secondary to cognitive impairment/dementia.  Because of this patient's spouse provided HPI and ROS.  Discussed with his spouse recent concerns of recurrent syncopal episodes.  Spouse states that up until this month, she has not had a syncopal episode since March, 2024.  Recent neurology follow-up completed and most recent episode appears to be attributed to a vasovagal response.  Patient was apparently vomiting prior to passing out.  Specimen denies any additional syncopal episodes this month.  Patient also follows routinely with cardiology for history of hypertension.  No reported issues with shortness of breath, chest pain/pressure, headaches, blurred vision or other cardiac concerns.  Patient follows routinely with nephrology for CKD 3.  Spouse states he is doing his best to try to increase patient's fluid intake.  Spouse is patient's primary caregiver.  He reports that family lives nearby and he has an overall good support system.  Patient is scheduled for a geriatric assessment to be completed in the near future.  The couple go to [place] annually for 4 months out of the year.  Spouse states that patient is left alone for several hours at a time while he golfs but a phone is always nearby and patient has a life alert.  Routine dermatology follow-up due to history of skin cancer.  Patient has also undergone multiple Mohs procedures.  Spouse reports that patient exercises at home and will go walking with her.  No reported shortness of breath or chest pain upon exertion.  Occasional nocturia reported but no issues with bowel.  Appetite reportedly stable.  Medications reviewed.  No other acute concerns voiced at this time.           Current Outpatient Medications on File  Prior to Visit   Medication Sig Dispense Refill    cholecalciferol (Vitamin D-3) 50 mcg (2,000 unit) capsule Take 1 capsule (50 mcg) by mouth once daily.      cyanocobalamin, vitamin B-12, (Vitamin B-12) 1,000 mcg tablet extended release Take 1 tablet (1,000 mcg) by mouth once daily. as directed      erythromycin (Romycin) 5 mg/gram (0.5 %) ophthalmic ointment Apply 1 Application to affected eye(s).      flecainide (Tambocor) 50 mg tablet Take 1 tablet (50 mg) by mouth 2 times a day. 180 tablet 1    losartan (Cozaar) 25 mg tablet Take 1 tablet (25 mg) by mouth once daily. 90 tablet 3    neomycin-polymyxin B-dexameth (Polydex) 3.5 mg/g-10,000 unit/g-0.1 % ointment ophthalmic ointment APPLY 1/4 INCH IN THE LEFT LOWER LID 3 TIMES DAILY      rivaroxaban (Xarelto) 20 mg tablet Take 1 tablet (20 mg) by mouth once daily with breakfast. Take with food. 90 tablet 1     No current facility-administered medications on file prior to visit.       Past Medical History:   Diagnosis Date    Allergic 01/01/2010    Chronic kidney disease 01/01/2018    Disease due to severe acute respiratory syndrome coronavirus 2 (SARS-CoV-2) 10/29/2024    Encounter for gynecological examination (general) (routine) without abnormal findings     Encounter for routine pelvic examination    Head injury     Memory loss     Other conditions influencing health status     Screening for malignant neoplasms, colon    Pneumonia 10/29/2024    Syncope     Urinary tract bacterial infections 10/29/2024        Past Surgical History:   Procedure Laterality Date    CARDIAC ELECTROPHYSIOLOGY PROCEDURE Left 11/30/2023    Procedure: Loop Insertion;  Surgeon: Abel Chong MD;  Location: [facility];  Service: Electrophysiology;  Laterality: Left;    OTHER SURGICAL HISTORY  05/31/2013    Surgery Excision Of Parotid Tumor/Gland    OTHER SURGICAL HISTORY  05/31/2013    Spinal Diskectomy Lumbar    TONSILLECTOMY  05/31/2013    Tonsillectomy        Family  "History   Problem Relation Name Age of Onset    Colon cancer Mother Ana Chester     Other (Old Age) Father          Review of Systems   Constitutional:  Negative for chills, fatigue and fever.   HENT:  Negative for dental problem and trouble swallowing.    Eyes:  Negative for pain and visual disturbance.        Positive for dry eyes   Respiratory:  Negative for cough, shortness of breath and wheezing.    Cardiovascular:  Negative for chest pain, palpitations and leg swelling.   Gastrointestinal:  Negative for abdominal distention, abdominal pain, constipation, diarrhea and nausea.   Endocrine: Negative for cold intolerance and heat intolerance.   Genitourinary:  Negative for difficulty urinating.        Positive for nocturia   Musculoskeletal:  Negative for back pain, gait problem, joint swelling and myalgias.   Skin:  Negative for color change, pallor, rash and wound.        Positive for skin cancer history   Allergic/Immunologic: Negative for environmental allergies and food allergies.   Neurological:  Positive for syncope. Negative for dizziness, seizures, weakness and headaches.   Hematological:  Negative for adenopathy. Does not bruise/bleed easily.   Psychiatric/Behavioral:  Positive for confusion. Negative for behavioral problems.         Positive for cognitive impairment   All other systems reviewed and are negative.      Objective   /70   Pulse 81   Ht 1.664 m (5' 5.5\")   Wt 69.2 kg (152 lb 9.6 oz)   SpO2 98%   BMI 25.01 kg/m²     Physical Exam  Constitutional:       General: She is not in acute distress.     Appearance: Normal appearance. She is not toxic-appearing.   HENT:      Head: Normocephalic and atraumatic.      Right Ear: Tympanic membrane, ear canal and external ear normal.      Left Ear: Tympanic membrane, ear canal and external ear normal.      Nose: Nose normal.      Mouth/Throat:      Mouth: Mucous membranes are moist.      Pharynx: Oropharynx is clear.   Eyes:      " Extraocular Movements: Extraocular movements intact.      Conjunctiva/sclera: Conjunctivae normal.      Pupils: Pupils are equal, round, and reactive to light.   Cardiovascular:      Rate and Rhythm: Normal rate and regular rhythm.      Pulses: Normal pulses.      Heart sounds: Normal heart sounds. No murmur heard.  Pulmonary:      Effort: Pulmonary effort is normal.      Breath sounds: Normal breath sounds. No wheezing.   Abdominal:      General: Bowel sounds are normal.      Palpations: Abdomen is soft.   Musculoskeletal:         General: No swelling. Normal range of motion.      Cervical back: Normal range of motion and neck supple.   Skin:     General: Skin is warm and dry.   Neurological:      Mental Status: She is alert. Mental status is at baseline.      Cranial Nerves: No cranial nerve deficit.      Motor: No weakness.   Psychiatric:         Mood and Affect: Mood normal.         Behavior: Behavior normal.         Thought Content: Thought content normal.         Judgment: Judgment normal.         Assessment/Plan   Problem List Items Addressed This Visit             ICD-10-CM    Benign essential hypertension I10     Vital signs stable on current medication regiment.  Will continue to monitor patient's vital signs at subsequent visits.  Provider to be notified for any new cardiac concerns.         Mild cognitive impairment G31.84     Patient to maintain routine follow-up with neurology for continued evaluation/treatment recommendations.  Increased supervision recommended.  Patient to maintain schedule geriatric assessment         Stage 3b chronic kidney disease (Multi) N18.32     Patient to maintain routine follow-up with renal for continued evaluation/treatment recommendations         Syncope R55     Most recent episode appears to be secondary to vasovagal response.  Patient to maintain routine follow-up with neurology and cardiology.  Provider to be notified for any new syncopal episodes.         Relevant  Orders    Comprehensive Metabolic Panel (Completed)    CBC and Auto Differential (Completed)    Atrial flutter, unspecified type (Multi) I48.92     Has loop recorder. On flecainide and Xarelto.  Patient to maintain routine follow-up with cardiology for continued evaluation and treatment recommendations          Routine adult health maintenance - Primary Z00.00     Routine blood work ordered today for monitoring purposes  -Recent mammogram and ultrasound of breasts completed earlier this month.  Will continue to monitor per recommendations.  -Most recent bone density scan completed last year.  Will continue to monitor as appropriate.  -No indication for colonoscopy given age, lack of symptoms and cognitive impairment         Relevant Orders    Comprehensive Metabolic Panel (Completed)    TSH with reflex to Free T4 if abnormal (Completed)    CBC and Auto Differential (Completed)    Vitamin B12 (Completed)    Magnesium (Completed)    Lipid Panel Non-Fasting (Completed)     Other Visit Diagnoses         Codes    Blood glucose abnormal     R73.09    Relevant Orders    Hemoglobin A1C (Completed)

## 2024-11-01 LAB
EST. AVERAGE GLUCOSE BLD GHB EST-MCNC: 105 MG/DL
HBA1C MFR BLD: 5.3 %
VIT B12 SERPL-MCNC: 1574 PG/ML (ref 211–911)

## 2024-11-04 ENCOUNTER — HOSPITAL ENCOUNTER (OUTPATIENT)
Dept: CARDIOLOGY | Facility: CLINIC | Age: 84
Discharge: HOME | End: 2024-11-04
Payer: MEDICARE

## 2024-11-04 DIAGNOSIS — Z45.09 ENCOUNTER FOR LOOP RECORDER CHECK: ICD-10-CM

## 2024-11-04 DIAGNOSIS — R55 SYNCOPE AND COLLAPSE: ICD-10-CM

## 2024-11-04 PROBLEM — R79.89 ELEVATED SERUM CREATININE: Status: RESOLVED | Noted: 2023-05-15 | Resolved: 2024-11-04

## 2024-11-04 PROBLEM — N17.9 ACUTE RENAL FAILURE (CMS-HCC): Status: RESOLVED | Noted: 2024-10-29 | Resolved: 2024-11-04

## 2024-11-04 PROBLEM — E86.0 DEHYDRATION: Status: RESOLVED | Noted: 2024-10-29 | Resolved: 2024-11-04

## 2024-11-04 ASSESSMENT — ENCOUNTER SYMPTOMS
ROS SKIN COMMENTS: POSITIVE FOR SKIN CANCER HISTORY
CONFUSION: 1

## 2024-11-05 NOTE — ASSESSMENT & PLAN NOTE
Has loop recorder. On flecainide and Xarelto.  Patient to maintain routine follow-up with cardiology for continued evaluation and treatment recommendations

## 2024-11-05 NOTE — ASSESSMENT & PLAN NOTE
Most recent episode appears to be secondary to vasovagal response.  Patient to maintain routine follow-up with neurology and cardiology.  Provider to be notified for any new syncopal episodes.

## 2024-11-05 NOTE — ASSESSMENT & PLAN NOTE
Vital signs stable on current medication regiment.  Will continue to monitor patient's vital signs at subsequent visits.  Provider to be notified for any new cardiac concerns.

## 2024-11-05 NOTE — ASSESSMENT & PLAN NOTE
Routine blood work ordered today for monitoring purposes  -Recent mammogram and ultrasound of breasts completed earlier this month.  Will continue to monitor per recommendations.  -Most recent bone density scan completed last year.  Will continue to monitor as appropriate.  -No indication for colonoscopy given age, lack of symptoms and cognitive impairment

## 2024-11-05 NOTE — ASSESSMENT & PLAN NOTE
Patient to maintain routine follow-up with renal for continued evaluation/treatment recommendations

## 2024-11-05 NOTE — ASSESSMENT & PLAN NOTE
Patient to maintain routine follow-up with neurology for continued evaluation/treatment recommendations.  Increased supervision recommended.  Patient to maintain schedule geriatric assessment

## 2024-11-29 ENCOUNTER — APPOINTMENT (OUTPATIENT)
Dept: PRIMARY CARE | Facility: CLINIC | Age: 84
End: 2024-11-29
Payer: MEDICARE

## 2024-12-11 DIAGNOSIS — I26.09 OTHER PULMONARY EMBOLISM WITH ACUTE COR PULMONALE: ICD-10-CM

## 2024-12-13 ENCOUNTER — LAB (OUTPATIENT)
Dept: LAB | Facility: LAB | Age: 84
End: 2024-12-13
Payer: MEDICARE

## 2024-12-13 DIAGNOSIS — I10 HYPERTENSION, UNSPECIFIED TYPE: ICD-10-CM

## 2024-12-13 DIAGNOSIS — I10 BENIGN ESSENTIAL HYPERTENSION: ICD-10-CM

## 2024-12-13 DIAGNOSIS — N18.32 STAGE 3B CHRONIC KIDNEY DISEASE (MULTI): ICD-10-CM

## 2024-12-13 LAB — PTH-INTACT SERPL-MCNC: 111.6 PG/ML (ref 18.5–88)

## 2024-12-13 PROCEDURE — 82043 UR ALBUMIN QUANTITATIVE: CPT

## 2024-12-13 PROCEDURE — 82728 ASSAY OF FERRITIN: CPT

## 2024-12-13 PROCEDURE — 80069 RENAL FUNCTION PANEL: CPT

## 2024-12-13 PROCEDURE — 36415 COLL VENOUS BLD VENIPUNCTURE: CPT

## 2024-12-13 PROCEDURE — 83550 IRON BINDING TEST: CPT

## 2024-12-13 PROCEDURE — 82306 VITAMIN D 25 HYDROXY: CPT

## 2024-12-13 PROCEDURE — 83540 ASSAY OF IRON: CPT

## 2024-12-13 PROCEDURE — 83970 ASSAY OF PARATHORMONE: CPT

## 2024-12-13 PROCEDURE — 85027 COMPLETE CBC AUTOMATED: CPT

## 2024-12-13 PROCEDURE — 82570 ASSAY OF URINE CREATININE: CPT

## 2024-12-14 LAB
25(OH)D3 SERPL-MCNC: 42 NG/ML (ref 30–100)
ALBUMIN SERPL BCP-MCNC: 4.1 G/DL (ref 3.4–5)
ANION GAP SERPL CALC-SCNC: 13 MMOL/L (ref 10–20)
BUN SERPL-MCNC: 26 MG/DL (ref 6–23)
CALCIUM SERPL-MCNC: 10 MG/DL (ref 8.6–10.6)
CHLORIDE SERPL-SCNC: 106 MMOL/L (ref 98–107)
CO2 SERPL-SCNC: 26 MMOL/L (ref 21–32)
CREAT SERPL-MCNC: 1.57 MG/DL (ref 0.5–1.05)
CREAT UR-MCNC: 211.9 MG/DL (ref 20–320)
EGFRCR SERPLBLD CKD-EPI 2021: 33 ML/MIN/1.73M*2
ERYTHROCYTE [DISTWIDTH] IN BLOOD BY AUTOMATED COUNT: 13.2 % (ref 11.5–14.5)
FERRITIN SERPL-MCNC: 116 NG/ML (ref 8–150)
GLUCOSE SERPL-MCNC: 117 MG/DL (ref 74–99)
HCT VFR BLD AUTO: 44.2 % (ref 36–46)
HGB BLD-MCNC: 14.7 G/DL (ref 12–16)
IRON SATN MFR SERPL: 34 % (ref 25–45)
IRON SERPL-MCNC: 124 UG/DL (ref 35–150)
MCH RBC QN AUTO: 34.9 PG (ref 26–34)
MCHC RBC AUTO-ENTMCNC: 33.3 G/DL (ref 32–36)
MCV RBC AUTO: 105 FL (ref 80–100)
MICROALBUMIN UR-MCNC: 32.7 MG/L
MICROALBUMIN/CREAT UR: 15.4 UG/MG CREAT
NRBC BLD-RTO: 0 /100 WBCS (ref 0–0)
PHOSPHATE SERPL-MCNC: 3.1 MG/DL (ref 2.5–4.9)
PLATELET # BLD AUTO: 259 X10*3/UL (ref 150–450)
POTASSIUM SERPL-SCNC: 4.4 MMOL/L (ref 3.5–5.3)
RBC # BLD AUTO: 4.21 X10*6/UL (ref 4–5.2)
SODIUM SERPL-SCNC: 141 MMOL/L (ref 136–145)
TIBC SERPL-MCNC: 367 UG/DL (ref 240–445)
UIBC SERPL-MCNC: 243 UG/DL (ref 110–370)
WBC # BLD AUTO: 6.8 X10*3/UL (ref 4.4–11.3)

## 2024-12-17 ENCOUNTER — TELEMEDICINE (OUTPATIENT)
Dept: NEPHROLOGY | Facility: CLINIC | Age: 84
End: 2024-12-17
Payer: MEDICARE

## 2024-12-17 ENCOUNTER — APPOINTMENT (OUTPATIENT)
Dept: NEPHROLOGY | Facility: CLINIC | Age: 84
End: 2024-12-17
Payer: MEDICARE

## 2024-12-17 DIAGNOSIS — I10 BENIGN ESSENTIAL HYPERTENSION: ICD-10-CM

## 2024-12-17 DIAGNOSIS — N18.32 STAGE 3B CHRONIC KIDNEY DISEASE (MULTI): Primary | ICD-10-CM

## 2024-12-17 DIAGNOSIS — E55.9 VITAMIN D DEFICIENCY: ICD-10-CM

## 2024-12-17 DIAGNOSIS — I10 HYPERTENSION, UNSPECIFIED TYPE: ICD-10-CM

## 2024-12-17 PROCEDURE — 99213 OFFICE O/P EST LOW 20 MIN: CPT | Performed by: INTERNAL MEDICINE

## 2024-12-17 PROCEDURE — G2211 COMPLEX E/M VISIT ADD ON: HCPCS | Performed by: INTERNAL MEDICINE

## 2024-12-17 NOTE — PROGRESS NOTES
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  Lorie was evaluated virtually today at her house.   was there and asked questions.  No kidney related complaints or concerns.  No more dizziness after we dropped losartan dose.  No frequent falls.  Blood pressure is reported to be accepted at home.  She had blood work done in December 2024 that were reviewed with her and her  including stable serum creatinine 1.5 and stable GFR 33 consistent with prior baseline.  With normal electrolytes.  No anemia hemoglobin 14.7.  Accepted iron storage parameters.  No albuminuria spot test ACR negative.  Vitamin D is normal-has been reports she is being on 1000 mg D3.  PTH is elevated 116-no change from prior.   showed some concern about elevated PTH-reassured.  Overall she is stable from kidney standpoint.  6 months follow-up    Prior notes    Last office visit December 2023.  Rechecking today with her .  She reports continuous dizziness.  She fell recently.  Blood pressure is accepted today.  She continues to be on losartan 50 mg.  Prior urinalysis came back with no albuminuria.  Serum creatinine is hovering between 1.4-1.6 and GFR around 30 relatively stable.  She is currently off bisphosphonate/alendronate.  She had blood work done recently that showed stable serum creatinine 1.6 and GFR 32 and within normal electrolytes with no significant acidosis.  No significant anemia.  With a normal vitamin D.  With normal calcium and magnesium.  We discussed conservative measures today.  Will drop losartan to address concerns about dizziness and possible hemodynamic injury.    Prior notes    Last office visit June 2023.  We followed conservative measures.  Today, Lorie  came with her  and her son.  No kidney related complaints or concerns.  Recent episodes of recurrent fainting-under cardiology/neurology evaluation.  She received internal cardiac monitor.  She denies palpitation today.  She denies shortness of breath or chest pain.  Blood pressure is excellent.  Recent blood work that showed stable improved serum creatinine and GFR.  Is normal electrolytes.  No albuminuria    Prior notes     Last office visit November 2022. In interim, she was admitted to the hospital in December 2022 after syncopal episode and was found to have COVID-19 and acute kidney injury. Today, she came with her . No kidney related concerns or complaints. She had blood work done recently and all results were discussed with him in details including serum creatinine back to baseline and within normal extra lites. Blood pressure is accepted today. No lower urinary tract symptoms     Per prior notes     Last initial office visit was August 2022. We discussed conservative measures. We continued RAAS inhibitor. We encouraged increased fluid intake and limit salt intake. We recommended holding alendronate temporarily. In interim, she feels well. She had blood work done recently and all results were discussed with her and her  in detail today including stable serum creatinine 1.3 and GFR 38 and within normal electrolytes including calcium. Vitamin D is slightly low-currently not on vitamin D supplements. Urinalysis bland with no blood or albumin. Lorie came today with her  and all questions were answered. They are planning to go to [place] over the winter        Per prior notes     Lorie came today with her . She reports feeling in her baseline state of health. She denies any urinary tract issues. She denies chest pain or shortness of breath. She follows low-salt diet. She thinks she is not drinking much fluid. She has been on bisphosphonate for a year. She denies blood or foam in  the urine. Urine dipstick today is bland. Blood pressure is accepted. No significant NSAID use at home     Family history: No kidney issues run in the family  Social history: . Used to work as an , non-smoker       Objective   There were no vitals taken for this visit.  Wt Readings from Last 3 Encounters:   10/31/24 69.2 kg (152 lb 9.6 oz)   10/21/24 68 kg (150 lb)   09/19/24 68 kg (150 lb)       Physical Exam    General appearance: no distress awake and alert on room air, euvolemic on exam  Eyes: non-icteric  HEENT: atrumatic head, PEERLA, moist mucosa  Skin: no apparent rash  Virtual exam today    Review of Systems     Constitutional: no fever, no chills, no recent weight gain and no recent weight loss.   Eyes: no blurred vision and no diplopia.   ENT: no hearing loss, no earache, no sore throat, no swollen glands in the neck and no nasal discharge.   Cardiovascular: no chest pain, no palpitations and no lower extremity edema.   Respiratory: no shortness of breath, no chronic cough and no shortness of breath during exertion.   Gastrointestinal: no abdominal pain, no constipation, no heartburn, no vomiting, no bloody stools and no change in bowel movements.   Genitourinary: no dysuria and no hematuria.   Musculoskeletal: no arthralgias and no myalgias.   Skin: no rashes and no skin lesions.   Neurologica dizziness  Psychiatric: no confusion, no depression and no anxiety.   Endocrine: no heat intolerance, no cold intolerance, appetite not increased, no thyroid disorder, no increased urinary frequency and no dry skin.   Hematologic/Lymphatic: does not bleed easily and does not bruise easily.   All other systems have been reviewed and are negative for complaint.         Data Review        Results from last 7 days   Lab Units 12/13/24  1352   WBC AUTO x10*3/uL 6.8   HEMOGLOBIN g/dL 14.7   HEMATOCRIT % 44.2   PLATELETS AUTO x10*3/uL 259            No results found for:  "\"URICACID\"        Lab Results   Component Value Date    HGBA1C 5.3 10/31/2024           Results from last 7 days   Lab Units 12/13/24  1352   SODIUM mmol/L 141   POTASSIUM mmol/L 4.4   CHLORIDE mmol/L 106   CO2 mmol/L 26   BUN mg/dL 26*   GLUCOSE mg/dL 117*   CALCIUM mg/dL 10.0   ANION GAP mmol/L 13   EGFR mL/min/1.73m*2 33*           Albumin/Creatinine Ratio   Date Value Ref Range Status   12/13/2024 15.4 <30.0 ug/mg Creat Final   12/11/2023 13.3 <30.0 ug/mg Creat Final     Albumin/Creatine Ratio   Date Value Ref Range Status   06/07/2023 5.0 0.0 - 30.0 ug/mg crt Final            RFP  Recent Labs     12/13/24  1352 10/31/24  1504 06/17/24  1154 05/13/24  1608 12/11/23  1339 07/31/23  1642 05/10/23  1501 12/17/22  0619 12/16/22  2154 11/28/22  1330    140 140 139 142 138 140 137   < > 140   K 4.4 4.5 5.0 4.5 4.5 4.5 4.7 3.9   < > 4.6    104 104 105 105 103 104 107   < > 104   CO2 26 28 26 26 26 27 26 23   < > 26   BUN 26* 23 33* 32* 17 25* 19 16   < > 17   CREATININE 1.57* 1.56* 1.61* 1.57* 1.37* 1.45* 1.36* 1.12*   < > 1.38*   GLUCOSE 117* 111* 85 105* 144* 96 100* 98   < > 129*   CALCIUM 10.0 9.5 9.9 9.9 9.6 9.3 9.7 8.5*   < > 9.9   PHOS 3.1  --  3.5  --  2.7  --   --  2.7  --  2.9   EGFR 33* 33* 32* 33* 39*  --   --   --   --   --    ANIONGAP 13 13 15 13 16 13 15 11   < > 15    < > = values in this interval not displayed.        Urineanalysis  Recent Labs     07/31/23  1841 12/16/22  2330 11/28/22  1330   COLORU STRAW YELLOW YELLOW   APPEARANCEU CLEAR CLEAR CLEAR   SPECGRAVU 1.013 1.020 >1.030*   NANCY 6.0 5.5 5.5   PROTUR NEGATIVE NEGATIVE NEGATIVE   GLUCOSEU NEGATIVE NEGATIVE NEGATIVE   BLOODU NEGATIVE TRACE* NEGATIVE   KETONESU 5(Trace)* TRACE* NEGATIVE   BILIRUBINU NEGATIVE NEGATIVE NEGATIVE   NITRITEU NEGATIVE NEGATIVE NEGATIVE   LEUKOCYTESU NEGATIVE TRACE* NEGATIVE       Urine Electrolytes  Recent Labs     12/13/24  1352 12/11/23  1339 07/31/23  1841 06/07/23  1625 12/16/22  2330 11/28/22  1330 "   CREATU 211.9 103.6  --  212.0  --  185.0   PROTUR  --   --  NEGATIVE  --  NEGATIVE NEGATIVE   ALBUMINUR 32.7 13.8  --   --   --   --    MICROALBCREA 15.4 13.3  --  5.0  --  4.2        Urine Micro  Recent Labs     12/16/22  2330   WBCU 2   RBCU <1   SQUAMEPIU 1   BACTERIAU 1+*   MUCUSU FEW        Iron  Recent Labs     12/13/24  1352 06/17/24  1154 12/11/23  1339 12/16/22  2154   IRON 124 143 73  --    TIBC 367 375 346  --    IRONSAT 34 38 21*  --    FERRITIN 116 91 162* 151*            Current Outpatient Medications:     cholecalciferol (Vitamin D-3) 50 mcg (2,000 unit) capsule, Take 1 capsule (50 mcg) by mouth once daily., Disp: , Rfl:     cyanocobalamin, vitamin B-12, (Vitamin B-12) 1,000 mcg tablet extended release, Take 1 tablet (1,000 mcg) by mouth once daily. as directed, Disp: , Rfl:     erythromycin (Romycin) 5 mg/gram (0.5 %) ophthalmic ointment, Apply 1 Application to affected eye(s)., Disp: , Rfl:     flecainide (Tambocor) 50 mg tablet, Take 1 tablet (50 mg) by mouth 2 times a day., Disp: 180 tablet, Rfl: 1    losartan (Cozaar) 25 mg tablet, Take 1 tablet (25 mg) by mouth once daily., Disp: 90 tablet, Rfl: 3    neomycin-polymyxin B-dexameth (Polydex) 3.5 mg/g-10,000 unit/g-0.1 % ointment ophthalmic ointment, APPLY 1/4 INCH IN THE LEFT LOWER LID 3 TIMES DAILY, Disp: , Rfl:     rivaroxaban (Xarelto) 20 mg tablet, Take 1 tablet (20 mg) by mouth once daily with breakfast. 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  New baseline serum creatinine 1.5-1.7, new baseline GFR around 30 mill per minute per 1.73 m²  -I am concerned about  possible bisphosphonate induced renal toxicity albeit her urine dipstick was bland-currently bisphosphonate is on hold. I counseled regarding conservative measures, limit salt intake, increase fluid intake and temporary continue to hold bisphosphonate.  Will continue decreased dose losartan 25 mg today.  Will monitor closely  -Kidney ultrasound is unremarkable in June 2022  -Urine analysis is bland with no albumin or blood.  Negative spot test ACR-will monitor     #Hypertension-accepted  -Current medication losartan 25 mg-dropped from prior 50 mg.  Negative albuminuria and possible hemodynamic injury causing dizziness-improved significantly after dropping losartan     #CKD/MBD/hyperparathyroidism  -She has a diagnosis of osteoporosis on alendronate 70 mg weekly-currently on hold  -Vitamin D is normal-continue D3 supplements within normal phosphorus, calcium, albumin.  -PTH is elevated-will monitor.  Will continue vitamin D supplements     #No significant anemia hemoglobin 13-14.  Negative SPEP     #CVS/PE-on Xarelto     Follow-up in 6 months with repeat blood work and urinalysis prior to visit              Shweta Garcia MD, MS, FR[facility] LOMAS  Clinical  - [facility] [employer]  Nephrologist - [facility]

## 2024-12-23 ENCOUNTER — HOSPITAL ENCOUNTER (OUTPATIENT)
Dept: CARDIOLOGY | Facility: CLINIC | Age: 84
Discharge: HOME | End: 2024-12-23
Payer: MEDICARE

## 2024-12-23 DIAGNOSIS — Z45.09 ENCOUNTER FOR LOOP RECORDER CHECK: ICD-10-CM

## 2024-12-23 DIAGNOSIS — R55 SYNCOPE AND COLLAPSE: ICD-10-CM

## 2024-12-23 PROCEDURE — 93298 REM INTERROG DEV EVAL SCRMS: CPT | Performed by: STUDENT IN AN ORGANIZED HEALTH CARE EDUCATION/TRAINING PROGRAM

## 2024-12-23 PROCEDURE — 93298 REM INTERROG DEV EVAL SCRMS: CPT

## 2024-12-31 ENCOUNTER — APPOINTMENT (OUTPATIENT)
Dept: NEUROLOGY | Facility: CLINIC | Age: 84
End: 2024-12-31
Payer: MEDICARE

## 2025-01-03 ENCOUNTER — TELEPHONE (OUTPATIENT)
Dept: NEUROLOGY | Facility: CLINIC | Age: 85
End: 2025-01-03
Payer: MEDICARE

## 2025-01-27 DIAGNOSIS — I47.10 PAROXYSMAL SUPRAVENTRICULAR TACHYCARDIA (CMS-HCC): ICD-10-CM

## 2025-01-27 DIAGNOSIS — I48.92 ATRIAL FLUTTER, UNSPECIFIED TYPE (MULTI): ICD-10-CM

## 2025-01-27 RX ORDER — FLECAINIDE ACETATE 50 MG/1
50 TABLET ORAL 2 TIMES DAILY
Qty: 180 TABLET | Refills: 1 | Status: SHIPPED | OUTPATIENT
Start: 2025-01-27 | End: 2025-07-26

## 2025-01-30 ENCOUNTER — HOSPITAL ENCOUNTER (OUTPATIENT)
Dept: CARDIOLOGY | Facility: CLINIC | Age: 85
Discharge: HOME | End: 2025-01-30
Payer: MEDICARE

## 2025-01-30 DIAGNOSIS — Z45.09 ENCOUNTER FOR LOOP RECORDER CHECK: ICD-10-CM

## 2025-01-30 DIAGNOSIS — R55 SYNCOPE AND COLLAPSE: ICD-10-CM

## 2025-02-03 ENCOUNTER — HOSPITAL ENCOUNTER (OUTPATIENT)
Dept: CARDIOLOGY | Facility: CLINIC | Age: 85
Discharge: HOME | End: 2025-02-03
Payer: MEDICARE

## 2025-02-03 DIAGNOSIS — R55 SYNCOPE AND COLLAPSE: ICD-10-CM

## 2025-02-03 DIAGNOSIS — Z45.09 ENCOUNTER FOR LOOP RECORDER CHECK: ICD-10-CM

## 2025-03-11 DIAGNOSIS — I26.09 OTHER PULMONARY EMBOLISM WITH ACUTE COR PULMONALE: ICD-10-CM

## 2025-03-11 DIAGNOSIS — N18.32 STAGE 3B CHRONIC KIDNEY DISEASE (MULTI): ICD-10-CM

## 2025-03-11 DIAGNOSIS — I10 BENIGN ESSENTIAL HYPERTENSION: ICD-10-CM

## 2025-03-11 DIAGNOSIS — I10 HYPERTENSION, UNSPECIFIED TYPE: ICD-10-CM

## 2025-03-11 RX ORDER — LOSARTAN POTASSIUM 25 MG/1
25 TABLET ORAL DAILY
Qty: 90 TABLET | Refills: 0 | Status: SHIPPED | OUTPATIENT
Start: 2025-03-11 | End: 2025-06-09

## 2025-04-11 ENCOUNTER — APPOINTMENT (OUTPATIENT)
Dept: GERIATRIC MEDICINE | Facility: CLINIC | Age: 85
End: 2025-04-11
Payer: MEDICARE

## 2025-04-11 ENCOUNTER — APPOINTMENT (OUTPATIENT)
Dept: NEUROLOGY | Facility: CLINIC | Age: 85
End: 2025-04-11
Payer: MEDICARE

## 2025-04-16 ENCOUNTER — HOSPITAL ENCOUNTER (OUTPATIENT)
Dept: CARDIOLOGY | Facility: CLINIC | Age: 85
Discharge: HOME | End: 2025-04-16
Payer: MEDICARE

## 2025-04-16 DIAGNOSIS — R55 SYNCOPE AND COLLAPSE: ICD-10-CM

## 2025-04-16 DIAGNOSIS — Z45.09 ENCOUNTER FOR LOOP RECORDER CHECK: ICD-10-CM

## 2025-04-16 PROCEDURE — 93298 REM INTERROG DEV EVAL SCRMS: CPT

## 2025-04-17 ENCOUNTER — HOSPITAL ENCOUNTER (OUTPATIENT)
Dept: CARDIOLOGY | Facility: CLINIC | Age: 85
Discharge: HOME | End: 2025-04-17
Payer: MEDICARE

## 2025-04-17 DIAGNOSIS — R55 SYNCOPE AND COLLAPSE: ICD-10-CM

## 2025-04-17 DIAGNOSIS — Z45.09 ENCOUNTER FOR LOOP RECORDER CHECK: ICD-10-CM

## 2025-04-28 ENCOUNTER — TELEPHONE (OUTPATIENT)
Dept: PRIMARY CARE | Facility: CLINIC | Age: 85
End: 2025-04-28
Payer: MEDICARE

## 2025-04-28 DIAGNOSIS — I48.92 ATRIAL FLUTTER, UNSPECIFIED TYPE (MULTI): ICD-10-CM

## 2025-04-28 DIAGNOSIS — I47.10 PAROXYSMAL SUPRAVENTRICULAR TACHYCARDIA (CMS-HCC): ICD-10-CM

## 2025-04-28 RX ORDER — FLECAINIDE ACETATE 50 MG/1
50 TABLET ORAL 2 TIMES DAILY
Qty: 28 TABLET | Refills: 0 | Status: SHIPPED | OUTPATIENT
Start: 2025-04-28 | End: 2025-05-12

## 2025-04-28 NOTE — TELEPHONE ENCOUNTER
MEDICATION REFILL    Pt states they are in [place] til 05/05/2025, but would like refill sent to local pharmacy here.     Pharmacy Name:    [facility]  Medication requested           Flecainide    50 mg  Dosage    1 tab 2 x daily  Quantity       90 days    Allergies    none given  Date of last visit  10/31/2024  Date of Next Appointment  pt will be seeing new pcp on 05/12/2025

## 2025-04-29 ENCOUNTER — APPOINTMENT (OUTPATIENT)
Dept: PRIMARY CARE | Facility: CLINIC | Age: 85
End: 2025-04-29
Payer: MEDICARE

## 2025-05-05 ENCOUNTER — HOSPITAL ENCOUNTER (OUTPATIENT)
Dept: CARDIOLOGY | Facility: CLINIC | Age: 85
Discharge: HOME | End: 2025-05-05
Payer: MEDICARE

## 2025-05-05 DIAGNOSIS — Z45.09 ENCOUNTER FOR LOOP RECORDER CHECK: ICD-10-CM

## 2025-05-05 DIAGNOSIS — R55 SYNCOPE AND COLLAPSE: ICD-10-CM

## 2025-05-09 ENCOUNTER — APPOINTMENT (OUTPATIENT)
Dept: NEUROLOGY | Facility: CLINIC | Age: 85
End: 2025-05-09
Payer: MEDICARE

## 2025-05-09 ENCOUNTER — APPOINTMENT (OUTPATIENT)
Dept: GERIATRIC MEDICINE | Facility: CLINIC | Age: 85
End: 2025-05-09
Payer: MEDICARE

## 2025-05-12 ENCOUNTER — APPOINTMENT (OUTPATIENT)
Dept: PRIMARY CARE | Facility: CLINIC | Age: 85
End: 2025-05-12
Payer: MEDICARE

## 2025-05-12 VITALS
HEIGHT: 66 IN | SYSTOLIC BLOOD PRESSURE: 124 MMHG | WEIGHT: 157 LBS | TEMPERATURE: 98 F | OXYGEN SATURATION: 98 % | DIASTOLIC BLOOD PRESSURE: 68 MMHG | HEART RATE: 92 BPM | BODY MASS INDEX: 25.23 KG/M2 | RESPIRATION RATE: 16 BRPM

## 2025-05-12 DIAGNOSIS — I48.92 ATRIAL FLUTTER, UNSPECIFIED TYPE (MULTI): ICD-10-CM

## 2025-05-12 DIAGNOSIS — E53.8 B12 DEFICIENCY: ICD-10-CM

## 2025-05-12 DIAGNOSIS — I10 BENIGN ESSENTIAL HYPERTENSION: ICD-10-CM

## 2025-05-12 DIAGNOSIS — K63.5 POLYP OF COLON, UNSPECIFIED PART OF COLON, UNSPECIFIED TYPE: ICD-10-CM

## 2025-05-12 DIAGNOSIS — I47.10 PAROXYSMAL SUPRAVENTRICULAR TACHYCARDIA: ICD-10-CM

## 2025-05-12 DIAGNOSIS — I26.09 OTHER PULMONARY EMBOLISM WITH ACUTE COR PULMONALE: ICD-10-CM

## 2025-05-12 DIAGNOSIS — R73.9 HYPERGLYCEMIA: Primary | ICD-10-CM

## 2025-05-12 DIAGNOSIS — E78.5 HYPERLIPIDEMIA, UNSPECIFIED HYPERLIPIDEMIA TYPE: ICD-10-CM

## 2025-05-12 DIAGNOSIS — I10 HYPERTENSION, UNSPECIFIED TYPE: ICD-10-CM

## 2025-05-12 DIAGNOSIS — R55 RECURRENT SYNCOPE: ICD-10-CM

## 2025-05-12 DIAGNOSIS — F03.90 DEMENTIA WITHOUT BEHAVIORAL DISTURBANCE, PSYCHOTIC DISTURBANCE, MOOD DISTURBANCE, OR ANXIETY, UNSPECIFIED DEMENTIA SEVERITY, UNSPECIFIED DEMENTIA TYPE: ICD-10-CM

## 2025-05-12 DIAGNOSIS — N18.32 STAGE 3B CHRONIC KIDNEY DISEASE (MULTI): ICD-10-CM

## 2025-05-12 PROCEDURE — G0439 PPPS, SUBSEQ VISIT: HCPCS | Performed by: FAMILY MEDICINE

## 2025-05-12 PROCEDURE — 1170F FXNL STATUS ASSESSED: CPT | Performed by: FAMILY MEDICINE

## 2025-05-12 PROCEDURE — 99213 OFFICE O/P EST LOW 20 MIN: CPT | Performed by: FAMILY MEDICINE

## 2025-05-12 PROCEDURE — 3078F DIAST BP <80 MM HG: CPT | Performed by: FAMILY MEDICINE

## 2025-05-12 PROCEDURE — 1123F ACP DISCUSS/DSCN MKR DOCD: CPT | Performed by: FAMILY MEDICINE

## 2025-05-12 PROCEDURE — 1158F ADVNC CARE PLAN TLK DOCD: CPT | Performed by: FAMILY MEDICINE

## 2025-05-12 PROCEDURE — 3074F SYST BP LT 130 MM HG: CPT | Performed by: FAMILY MEDICINE

## 2025-05-12 RX ORDER — FLECAINIDE ACETATE 50 MG/1
50 TABLET ORAL 2 TIMES DAILY
Qty: 180 TABLET | Refills: 3 | Status: SHIPPED | OUTPATIENT
Start: 2025-05-12 | End: 2026-05-12

## 2025-05-12 RX ORDER — LOSARTAN POTASSIUM 25 MG/1
25 TABLET ORAL DAILY
Qty: 90 TABLET | Refills: 3 | Status: CANCELLED | OUTPATIENT
Start: 2025-05-12 | End: 2025-08-10

## 2025-05-12 RX ORDER — RESPIRATORY SYNCYTIAL VISUS VACCINE RECOMBINANT, ADJUVANTED 120MCG/0.5
0.5 KIT INTRAMUSCULAR ONCE
COMMUNITY
Start: 2024-09-04

## 2025-05-12 ASSESSMENT — PATIENT HEALTH QUESTIONNAIRE - PHQ9
3. TROUBLE FALLING OR STAYING ASLEEP OR SLEEPING TOO MUCH: NOT AT ALL
2. FEELING DOWN, DEPRESSED OR HOPELESS: NOT AT ALL
SUM OF ALL RESPONSES TO PHQ QUESTIONS 1-9: 2
4. FEELING TIRED OR HAVING LITTLE ENERGY: NOT AT ALL
10. IF YOU CHECKED OFF ANY PROBLEMS, HOW DIFFICULT HAVE THESE PROBLEMS MADE IT FOR YOU TO DO YOUR WORK, TAKE CARE OF THINGS AT HOME, OR GET ALONG WITH OTHER PEOPLE: NOT DIFFICULT AT ALL
5. POOR APPETITE OR OVEREATING: NOT AT ALL
SUM OF ALL RESPONSES TO PHQ9 QUESTIONS 1 AND 2: 2
1. LITTLE INTEREST OR PLEASURE IN DOING THINGS: MORE THAN HALF THE DAYS
8. MOVING OR SPEAKING SO SLOWLY THAT OTHER PEOPLE COULD HAVE NOTICED. OR THE OPPOSITE, BEING SO FIGETY OR RESTLESS THAT YOU HAVE BEEN MOVING AROUND A LOT MORE THAN USUAL: NOT AT ALL
6. FEELING BAD ABOUT YOURSELF - OR THAT YOU ARE A FAILURE OR HAVE LET YOURSELF OR YOUR FAMILY DOWN: NOT AT ALL
9. THOUGHTS THAT YOU WOULD BE BETTER OFF DEAD, OR OF HURTING YOURSELF: NOT AT ALL
7. TROUBLE CONCENTRATING ON THINGS, SUCH AS READING THE NEWSPAPER OR WATCHING TELEVISION: NOT AT ALL

## 2025-05-12 ASSESSMENT — ACTIVITIES OF DAILY LIVING (ADL)
MANAGING_FINANCES: TOTAL CARE
TAKING_MEDICATION: NEEDS ASSISTANCE
DRESSING: INDEPENDENT
BATHING: INDEPENDENT
DOING_HOUSEWORK: NEEDS ASSISTANCE
GROCERY_SHOPPING: TOTAL CARE

## 2025-05-12 ASSESSMENT — ENCOUNTER SYMPTOMS
LOSS OF SENSATION IN FEET: 0
OCCASIONAL FEELINGS OF UNSTEADINESS: 0
DEPRESSION: 0

## 2025-05-12 NOTE — PROGRESS NOTES
"Subjective   Lorie Ding is a 84 y.o. female who presents for Establish Care.  HPI  PMH:  DVT/PE-xarelto 2021 x2, 2008. Life AC   OP DEXA -2.7 10/2023   Autonomic dysfnc-Dr Alfred   Orthostatic hypotension   Recurrent syncope 2024-LOOP Hx SVT   CKD 3b-Dr SNEHAL Mcrae/RSV/PNA UTD   Colon polyps 11/2020 rpt 5 yr    Here to get est/AWV.     Sees neuro yearly.     Fell 5/2 while on a walk and was hot. Was not seen. Drank water.  caught her.   Fell at home on Nov.     Mohs last wk, BCC on  cheek.     Taking lower dose b12 from 1000 to 500     Needs med refills     Has done chair yoga and fainted.   Medications Ordered Prior to Encounter[1]               Objective   /68   Pulse 92   Temp 36.7 °C (98 °F) (Temporal)   Resp 16   Ht 1.664 m (5' 5.5\")   Wt 71.2 kg (157 lb)   SpO2 98%   BMI 25.73 kg/m²    Physical Exam  General: NC, L facial bandage   HEENT:NCAT, PERRLA, nml OP  Neck: no cervical MARIO  Heart: RRR no murmur, no edema   Lungs: CTA b/l, no wheeze or rhonchi   MSK: no c/c/e. nml gait   Skin: warm and dry  Psych: cooperative, appropriate affect  Neuro: speech clear. A&O to person/place/situation, not yr/date-knows mo     Assessment/Plan   Problem List Items Addressed This Visit       Benign essential hypertension  On losartan also for CKD      Colon polyps  Risks outweigh benefits of rpt c-scope at her age. Pt and family agree. Will hold off on rpt c-scopes      Hyperlipidemia  Check lipids w upcoming labs       Relevant Orders    Lipid Panel    Hepatic Function Panel    Stage 3b chronic kidney disease (Multi)  Stable per nephro  Upcoming labs next mo       Other Visit Diagnoses         Hyperglycemia    -  Primary  Check A1c w upcoming labs     Relevant Orders    Hemoglobin A1C            Paroxysmal supraventricular tachycardia (CMS-HCC)      Stable on flec BID, refilled  Rec to see cards yearly       Relevant Medications    flecainide (Tambocor) 50 mg tablet      Atrial flutter, unspecified type " (Multi)      See above  Has LOOP       Relevant Medications    flecainide (Tambocor) 50 mg tablet      Other pulmonary embolism with acute cor pulmonale      Lifelong AC  RF carelto       Relevant Medications    rivaroxaban (Xarelto) 20 mg tablet      B12 deficiency      Check b12 since dec b12 from 1000 to 500       Relevant Orders    Vitamin B12      Dementia without behavioral disturbance, psychotic disturbance, mood disturbance, or anxiety, unspecified dementia severity, unspecified dementia type      Follows w neuro/[facility]   Check TSH/b12 w labs   Mostly stable     Relevant Orders    Vitamin B12    TSH with reflex to Free T4 if abnormal      Recurrent syncope      Extensive w/up w LOOP recorder in past   Thought to be d/t orthostatic hypotension and rec inc fluids (2-3L/d), comp stockings,     BCC: Recent Mohs  Sutures removed soon     Recent fall: in the setting of heat/dehydration but did not have med eval    caught pt. No traumatic impact which is reassuring given she's on AC     AWV   overall doing fair. Cont balanced diet/ex as leeroy   BMI: 25  VACC: reviewed PNA/RSV/shingrix UTD  COLON CA SCRN: n/a see above  LABS: ordered  PAP: n/a  MAMMO: UTD  DEXA: UTD     RTC 6 mo labs prior                  [1]   Current Outpatient Medications on File Prior to Visit   Medication Sig Dispense Refill    Arexvy, PF, 120 mcg/0.5 mL suspension for reconstitution Inject 0.5 mL into the muscle 1 time.      cholecalciferol (Vitamin D-3) 50 mcg (2,000 unit) capsule Take 1 capsule (50 mcg) by mouth once daily.      cyanocobalamin, vitamin B-12, (Vitamin B-12) 1,000 mcg tablet extended release Take 1 tablet (1,000 mcg) by mouth once daily. as directed      losartan (Cozaar) 25 mg tablet Take 1 tablet (25 mg) by mouth once daily. 90 tablet 0    [DISCONTINUED] erythromycin (Romycin) 5 mg/gram (0.5 %) ophthalmic ointment Apply 1 Application to affected eye(s).      [DISCONTINUED] flecainide (Tambocor) 50 mg tablet Take 1 tablet  (50 mg) by mouth 2 times a day for 14 days. 28 tablet 0    [DISCONTINUED] neomycin-polymyxin B-dexameth (Polydex) 3.5 mg/g-10,000 unit/g-0.1 % ointment ophthalmic ointment APPLY 1/4 INCH IN THE LEFT LOWER LID 3 TIMES DAILY      [DISCONTINUED] rivaroxaban (Xarelto) 20 mg tablet Take 1 tablet (20 mg) by mouth once daily with breakfast. Take with food. 90 tablet 0     No current facility-administered medications on file prior to visit.

## 2025-05-18 ENCOUNTER — PATIENT MESSAGE (OUTPATIENT)
Dept: GERIATRIC MEDICINE | Facility: CLINIC | Age: 85
End: 2025-05-18
Payer: MEDICARE

## 2025-05-21 ENCOUNTER — HOSPITAL ENCOUNTER (OUTPATIENT)
Dept: CARDIOLOGY | Facility: CLINIC | Age: 85
Discharge: HOME | End: 2025-05-21
Payer: MEDICARE

## 2025-05-21 ENCOUNTER — APPOINTMENT (OUTPATIENT)
Dept: NEUROLOGY | Facility: CLINIC | Age: 85
End: 2025-05-21
Payer: MEDICARE

## 2025-05-21 DIAGNOSIS — Z45.09 ENCOUNTER FOR LOOP RECORDER CHECK: ICD-10-CM

## 2025-05-21 DIAGNOSIS — R55 SYNCOPE AND COLLAPSE: ICD-10-CM

## 2025-05-21 PROCEDURE — 93298 REM INTERROG DEV EVAL SCRMS: CPT

## 2025-05-22 DIAGNOSIS — N18.32 STAGE 3B CHRONIC KIDNEY DISEASE (MULTI): ICD-10-CM

## 2025-05-22 DIAGNOSIS — E55.9 VITAMIN D DEFICIENCY: ICD-10-CM

## 2025-05-22 DIAGNOSIS — I10 BENIGN ESSENTIAL HYPERTENSION: ICD-10-CM

## 2025-05-22 DIAGNOSIS — I10 HYPERTENSION, UNSPECIFIED TYPE: ICD-10-CM

## 2025-05-23 ENCOUNTER — APPOINTMENT (OUTPATIENT)
Dept: PRIMARY CARE | Facility: CLINIC | Age: 85
End: 2025-05-23
Payer: MEDICARE

## 2025-05-26 ASSESSMENT — ENCOUNTER SYMPTOMS
MEMORY LOSS: 1
NEUROLOGIC COMPLAINT: 1

## 2025-05-27 ENCOUNTER — APPOINTMENT (OUTPATIENT)
Dept: GERIATRIC MEDICINE | Facility: CLINIC | Age: 85
End: 2025-05-27
Payer: MEDICARE

## 2025-05-28 ENCOUNTER — OFFICE VISIT (OUTPATIENT)
Dept: NEUROLOGY | Facility: CLINIC | Age: 85
End: 2025-05-28
Payer: MEDICARE

## 2025-05-28 ENCOUNTER — TELEPHONE (OUTPATIENT)
Dept: PRIMARY CARE | Facility: CLINIC | Age: 85
End: 2025-05-28

## 2025-05-28 VITALS
SYSTOLIC BLOOD PRESSURE: 158 MMHG | HEIGHT: 64 IN | HEART RATE: 77 BPM | DIASTOLIC BLOOD PRESSURE: 73 MMHG | BODY MASS INDEX: 23.05 KG/M2 | WEIGHT: 135 LBS

## 2025-05-28 DIAGNOSIS — F09 COGNITIVE DISORDER: ICD-10-CM

## 2025-05-28 DIAGNOSIS — I95.1 ORTHOSTATIC HYPOTENSION: Primary | ICD-10-CM

## 2025-05-28 LAB
25(OH)D3+25(OH)D2 SERPL-MCNC: 37 NG/ML (ref 30–100)
ALBUMIN SERPL-MCNC: 4.1 G/DL (ref 3.6–5.1)
ALBUMIN SERPL-MCNC: 4.2 G/DL (ref 3.6–5.1)
ALBUMIN/CREAT UR: 3 MG/G CREAT
ALBUMIN/GLOB SERPL: 1.6 (CALC) (ref 1–2.5)
ALP SERPL-CCNC: 67 U/L (ref 37–153)
ALT SERPL-CCNC: 9 U/L (ref 6–29)
AST SERPL-CCNC: 16 U/L (ref 10–35)
BILIRUB DIRECT SERPL-MCNC: 0.1 MG/DL
BILIRUB INDIRECT SERPL-MCNC: 0.6 MG/DL (CALC) (ref 0.2–1.2)
BILIRUB SERPL-MCNC: 0.7 MG/DL (ref 0.2–1.2)
BUN SERPL-MCNC: 22 MG/DL (ref 7–25)
BUN/CREAT SERPL: 15 (CALC) (ref 6–22)
CALCIUM SERPL-MCNC: 10.1 MG/DL (ref 8.6–10.4)
CHLORIDE SERPL-SCNC: 106 MMOL/L (ref 98–110)
CHOLEST SERPL-MCNC: 240 MG/DL
CHOLEST/HDLC SERPL: 2.3 (CALC)
CO2 SERPL-SCNC: 26 MMOL/L (ref 20–32)
CREAT SERPL-MCNC: 1.49 MG/DL (ref 0.6–0.95)
CREAT UR-MCNC: 208 MG/DL (ref 20–275)
EGFRCR SERPLBLD CKD-EPI 2021: 35 ML/MIN/1.73M2
ERYTHROCYTE [DISTWIDTH] IN BLOOD BY AUTOMATED COUNT: 12.1 % (ref 11–15)
EST. AVERAGE GLUCOSE BLD GHB EST-MCNC: 120 MG/DL
EST. AVERAGE GLUCOSE BLD GHB EST-SCNC: 6.6 MMOL/L
FERRITIN SERPL-MCNC: 78 NG/ML (ref 16–288)
GLOBULIN SER CALC-MCNC: 2.6 G/DL (CALC) (ref 1.9–3.7)
GLUCOSE SERPL-MCNC: 111 MG/DL (ref 65–99)
HBA1C MFR BLD: 5.8 %
HCT VFR BLD AUTO: 45.8 % (ref 35–45)
HDLC SERPL-MCNC: 106 MG/DL
HGB BLD-MCNC: 15 G/DL (ref 11.7–15.5)
IRON SATN MFR SERPL: 26 % (CALC) (ref 16–45)
IRON SERPL-MCNC: 91 MCG/DL (ref 45–160)
LDLC SERPL CALC-MCNC: 110 MG/DL (CALC)
MCH RBC QN AUTO: 33.6 PG (ref 27–33)
MCHC RBC AUTO-ENTMCNC: 32.8 G/DL (ref 32–36)
MCV RBC AUTO: 102.7 FL (ref 80–100)
MICROALBUMIN UR-MCNC: 0.7 MG/DL
NONHDLC SERPL-MCNC: 134 MG/DL (CALC)
PHOSPHATE SERPL-MCNC: 3 MG/DL (ref 2.1–4.3)
PLATELET # BLD AUTO: 260 THOUSAND/UL (ref 140–400)
PMV BLD REES-ECKER: 9.7 FL (ref 7.5–12.5)
POTASSIUM SERPL-SCNC: 4.6 MMOL/L (ref 3.5–5.3)
PROT SERPL-MCNC: 6.7 G/DL (ref 6.1–8.1)
PTH-INTACT SERPL-MCNC: 88 PG/ML (ref 16–77)
RBC # BLD AUTO: 4.46 MILLION/UL (ref 3.8–5.1)
SODIUM SERPL-SCNC: 141 MMOL/L (ref 135–146)
TIBC SERPL-MCNC: 355 MCG/DL (CALC) (ref 250–450)
TRIGL SERPL-MCNC: 125 MG/DL
TSH SERPL-ACNC: 1.72 MIU/L (ref 0.4–4.5)
VIT B12 SERPL-MCNC: 1095 PG/ML (ref 200–1100)
WBC # BLD AUTO: 7 THOUSAND/UL (ref 3.8–10.8)

## 2025-05-28 PROCEDURE — 1159F MED LIST DOCD IN RCRD: CPT | Performed by: SPECIALIST

## 2025-05-28 PROCEDURE — 99215 OFFICE O/P EST HI 40 MIN: CPT | Performed by: SPECIALIST

## 2025-05-28 PROCEDURE — 3078F DIAST BP <80 MM HG: CPT | Performed by: SPECIALIST

## 2025-05-28 PROCEDURE — G2212 PROLONG OUTPT/OFFICE VIS: HCPCS | Performed by: SPECIALIST

## 2025-05-28 PROCEDURE — 3077F SYST BP >= 140 MM HG: CPT | Performed by: SPECIALIST

## 2025-05-28 PROCEDURE — 1036F TOBACCO NON-USER: CPT | Performed by: SPECIALIST

## 2025-05-28 ASSESSMENT — PATIENT HEALTH QUESTIONNAIRE - PHQ9
SUM OF ALL RESPONSES TO PHQ9 QUESTIONS 1 & 2: 0
1. LITTLE INTEREST OR PLEASURE IN DOING THINGS: NOT AT ALL
SUM OF ALL RESPONSES TO PHQ9 QUESTIONS 1 AND 2: 0
2. FEELING DOWN, DEPRESSED OR HOPELESS: NOT AT ALL
2. FEELING DOWN, DEPRESSED OR HOPELESS: NOT AT ALL
1. LITTLE INTEREST OR PLEASURE IN DOING THINGS: NOT AT ALL

## 2025-05-28 ASSESSMENT — ENCOUNTER SYMPTOMS
LOSS OF SENSATION IN FEET: 0
DEPRESSION: 0
OCCASIONAL FEELINGS OF UNSTEADINESS: 0

## 2025-05-28 NOTE — PROGRESS NOTES
Date of Service: 5/28/2025  Patient: Lorie Ding  MRN: [id number]  Referring Provider: No ref. provider found  Primary Care Physician: Brittany Behm, DO     History of Present Illness:    Ms. Ding is a 84 y.o. left handed female  with a past medical history of  hypertension, chronic kidney disease stage 3, DVT and pulmonary embolism (on Xarelto),who  has been suffering  for the last 2 years history of mild cognitive decline, personality changes, apathy and decreased emotional response, recurrent syncope and orthostasis. She had form fainting episodes that have been increasing in frequency, with about 10 to 12 episodes since the beginning of 2023. These episodes are often triggered by standing up or during physical exertion, leading to advice against walking alone due to fainting risks.    Additionally, Lorie describes significant personality changes following two falls in 2021,  that resulted in head injuries. She notes a diminished emotional response and a decreased interest in social interactions.    Her family has expressed concern over these changes, observing that Lorie has become less engaged in conversations and has withdrawn from social activities, now primarily interacting with family members.      Subjective:  Since her last visit, Lorie has experienced two fainting episodes. The first occurred in November when she vomited and nearly fell in the bathroom at home in [place]. The second episode happened in April after a 1.4-mile walk in 75-degree weather in [place]. These incidents represent a significant improvement from her previous pattern of fainting every 6 weeks.     Lorie's physical activity has slowly declined. She now walks about 0.4 miles to visit relatives, with difficulty coming uphill. She exercises on a recumbent bike at home for 30 minutes, 4-5 days a week, with minimal resistance. Her son reports that she sometimes asks to use the bike. Lorie has been participating in stretching exercises 2-3  times per week in [place].    Regarding her cognitive function, Lorie's son reports that while her physical functioning has improved, her memory and mood have been worsening. She is less engaged and not very social compared to before. However, she does not have issues recognizing family members. Lorie continues to engage in mental exercises such as reading, puzzles, crosswords, and Sudoku.    For activities of daily living, Lorie showers independently three times a week, though her son expresses concern about fall risk. She maintains a quick shower routine with cooler water temperatures to mitigate risk. Her diet consists of frosted flakes and orange juice for breakfast, a peanut butter and jelly sandwich with Cheetos for lunch, and either dining out or having a Lean Cuisine for dinner. She has fruit every night and continues to consume 10-12 ounces of wine daily.     A recent heart monitor reading showed a rate of 136, up from 143 in [place].    Social History  - Substance Use: Consumes 10-12 ounces of wine daily  - Living Situation: Lives with spouse  - Exercise: Walks 1.4 miles 3-4 times per week, stretches 2-3 times per week, uses recumbent bike at home  - Diet: Breakfast consists of Frosted Flakes and orange juice; lunch includes peanut butter and jelly sandwich with Cheetos; dinner varies between eating out and Lean Cuisine meals; consumes fruit nightly  - Hobbies/Interests: Enjoys reading, puzzles, crosswords, and Sudoku    Review of Systems  General: Positive for decreased social engagement.  HEENT: Positive for hearing difficulty.  Cardiovascular: Positive for difficulty walking uphill.  Neurological: Positive for memory issues, negative for problems recognizing family members.  Psychiatric: Positive for decreased social interaction.          Current Outpatient Medications on File Prior to Visit   Medication Sig Dispense Refill    Arexvy, PF, 120 mcg/0.5 mL suspension for reconstitution Inject 0.5 mL into the  muscle 1 time.      cholecalciferol (Vitamin D-3) 50 mcg (2,000 unit) capsule Take 1 capsule (50 mcg) by mouth once daily.      cyanocobalamin, vitamin B-12, (Vitamin B-12) 1,000 mcg tablet extended release Take 1 tablet (1,000 mcg) by mouth once daily. as directed      flecainide (Tambocor) 50 mg tablet Take 1 tablet (50 mg) by mouth 2 times a day. 180 tablet 3    losartan (Cozaar) 25 mg tablet Take 1 tablet (25 mg) by mouth once daily. 90 tablet 0    rivaroxaban (Xarelto) 20 mg tablet Take 1 tablet (20 mg) by mouth once daily with breakfast. Take with food. 90 tablet 3     No current facility-administered medications on file prior to visit.     I have personally reviewed the patient's lab work and results for the last year:  Orders Only on 05/22/2025   Component Date Value Ref Range Status    WHITE BLOOD CELL COUNT 05/27/2025 7.0  3.8 - 10.8 Thousand/uL Final    RED BLOOD CELL COUNT 05/27/2025 4.46  3.80 - 5.10 Million/uL Final    HEMOGLOBIN 05/27/2025 15.0  11.7 - 15.5 g/dL Final    HEMATOCRIT 05/27/2025 45.8 (H)  35.0 - 45.0 % Final    MCV 05/27/2025 102.7 (H)  80.0 - 100.0 fL Final    MCH 05/27/2025 33.6 (H)  27.0 - 33.0 pg Final    MCHC 05/27/2025 32.8  32.0 - 36.0 g/dL Final    Comment: For adults, a slight decrease in the calculated MCHC  value (in the range of 30 to 32 g/dL) is most likely  not clinically significant; however, it should be  interpreted with caution in correlation with other  red cell parameters and the patient's clinical  condition.      RDW 05/27/2025 12.1  11.0 - 15.0 % Final    PLATELET COUNT 05/27/2025 260  140 - 400 Thousand/uL Final    MPV 05/27/2025 9.7  7.5 - 12.5 fL Final    FERRITIN 05/27/2025 78  16 - 288 ng/mL Final    IRON, TOTAL 05/27/2025 91  45 - 160 mcg/dL Final    IRON BINDING CAPACITY 05/27/2025 355  250 - 450 mcg/dL (calc) Final    % SATURATION 05/27/2025 26  16 - 45 % (calc) Final    GLUCOSE 05/27/2025 111 (H)  65 - 99 mg/dL Final    Comment:               Fasting  reference interval     For someone without known diabetes, a glucose value  between 100 and 125 mg/dL is consistent with  prediabetes and should be confirmed with a  follow-up test.         UREA NITROGEN (BUN) 05/27/2025 22  7 - 25 mg/dL Final    CREATININE 05/27/2025 1.49 (H)  0.60 - 0.95 mg/dL Final    EGFR 05/27/2025 35 (L)  > OR = 60 mL/min/1.73m2 Final    BUN/CREATININE RATIO 05/27/2025 15  6 - 22 (calc) Final    SODIUM 05/27/2025 141  135 - 146 mmol/L Final    POTASSIUM 05/27/2025 4.6  3.5 - 5.3 mmol/L Final    CHLORIDE 05/27/2025 106  98 - 110 mmol/L Final    CARBON DIOXIDE 05/27/2025 26  20 - 32 mmol/L Final    CALCIUM 05/27/2025 10.1  8.6 - 10.4 mg/dL Final    PHOSPHATE (AS PHOSPHORUS) 05/27/2025 3.0  2.1 - 4.3 mg/dL Final    ALBUMIN 05/27/2025 4.2  3.6 - 5.1 g/dL Final    VITAMIN D,25-OH,TOTAL,IA 05/27/2025 37  30 - 100 ng/mL Final    Comment: Vitamin D Status         25-OH Vitamin D:     Deficiency:                    <20 ng/mL  Insufficiency:             20 - 29 ng/mL  Optimal:                 > or = 30 ng/mL     For 25-OH Vitamin D testing on patients on   D2-supplementation and patients for whom quantitation   of D2 and D3 fractions is required, the [facility]()  25-OH VIT D, (D2,D3), LC/MS/MS is recommended: order   code 81104 (patients >2yrs).     See Note 1     Note 1     For additional information, please refer to   http://[email or web address]/faq/LSA926   (This link is being provided for informational/  educational purposes only.)     Office Visit on 05/12/2025   Component Date Value Ref Range Status    HEMOGLOBIN A1c 05/27/2025 5.8 (H)  <5.7 % Final    Comment: For someone without known diabetes, a hemoglobin   A1c value between 5.7% and 6.4% is consistent with  prediabetes and should be confirmed with a   follow-up test.     For someone with known diabetes, a value <7%  indicates that their diabetes is well controlled. A1c  targets should be individualized based on duration  of  diabetes, age, comorbid conditions, and other  considerations.     This assay result is consistent with an increased risk  of diabetes.     Currently, no consensus exists regarding use of  hemoglobin A1c for diagnosis of diabetes for children.         eAG (mg/dL) 05/27/2025 120  mg/dL Final    eAG (mmol/L) 05/27/2025 6.6  mmol/L Final    CHOLESTEROL, TOTAL 05/27/2025 240 (H)  <200 mg/dL Final    HDL CHOLESTEROL 05/27/2025 106  > OR = 50 mg/dL Final    TRIGLYCERIDES 05/27/2025 125  <150 mg/dL Final    LDL-CHOLESTEROL 05/27/2025 110 (H)  mg/dL (calc) Final    Comment: Reference range: <100     Desirable range <100 mg/dL for primary prevention;    <70 mg/dL for patients with CHD or diabetic patients   with > or = 2 CHD risk factors.     LDL-C is now calculated using the [employer]   calculation, which is a validated novel method providing   better accuracy than the Friedewald equation in the   estimation of LDL-C.   Duncan BRITTON et al. COSME. 2013;310(): 6173-0696   (http://education.[employer]/faq/IEY926)      CHOL/HDLC RATIO 05/27/2025 2.3  <5.0 (calc) Final    NON HDL CHOLESTEROL 05/27/2025 134 (H)  <130 mg/dL (calc) Final    Comment: For patients with diabetes plus 1 major ASCVD risk   factor, treating to a non-HDL-C goal of <100 mg/dL   (LDL-C of <70 mg/dL) is considered a therapeutic   option.      VITAMIN B12 05/27/2025 1,095  200 - 1,100 pg/mL Final    TSH W/REFLEX TO FT4 05/27/2025 1.72  0.40 - 4.50 mIU/L Final    PROTEIN, TOTAL 05/27/2025 6.7  6.1 - 8.1 g/dL Final    ALBUMIN 05/27/2025 4.1  3.6 - 5.1 g/dL Final    GLOBULIN 05/27/2025 2.6  1.9 - 3.7 g/dL (calc) Final    ALBUMIN/GLOBULIN RATIO 05/27/2025 1.6  1.0 - 2.5 (calc) Final    BILIRUBIN, TOTAL 05/27/2025 0.7  0.2 - 1.2 mg/dL Final    BILIRUBIN, DIRECT 05/27/2025 0.1  < OR = 0.2 mg/dL Final    BILIRUBIN, INDIRECT 05/27/2025 0.6  0.2 - 1.2 mg/dL (calc) Final    ALKALINE PHOSPHATASE 05/27/2025 67  37 - 153 U/L Final    AST 05/27/2025 16  10  - 35 U/L Final    ALT 05/27/2025 9  6 - 29 U/L Final   Lab on 12/13/2024   Component Date Value Ref Range Status    Albumin, Urine Random 12/13/2024 32.7  Not established mg/L Final    Creatinine, Urine Random 12/13/2024 211.9  20.0 - 320.0 mg/dL Final    Albumin/Creatinine Ratio 12/13/2024 15.4  <30.0 ug/mg Creat Final    WBC 12/13/2024 6.8  4.4 - 11.3 x10*3/uL Final    nRBC 12/13/2024 0.0  0.0 - 0.0 /100 WBCs Final    RBC 12/13/2024 4.21  4.00 - 5.20 x10*6/uL Final    Hemoglobin 12/13/2024 14.7  12.0 - 16.0 g/dL Final    Hematocrit 12/13/2024 44.2  36.0 - 46.0 % Final    MCV 12/13/2024 105 (H)  80 - 100 fL Final    MCH 12/13/2024 34.9 (H)  26.0 - 34.0 pg Final    MCHC 12/13/2024 33.3  32.0 - 36.0 g/dL Final    RDW 12/13/2024 13.2  11.5 - 14.5 % Final    Platelets 12/13/2024 259  150 - 450 x10*3/uL Final    Ferritin 12/13/2024 116  8 - 150 ng/mL Final    Iron 12/13/2024 124  35 - 150 ug/dL Final    UIBC 12/13/2024 243  110 - 370 ug/dL Final    TIBC 12/13/2024 367  240 - 445 ug/dL Final    % Saturation 12/13/2024 34  25 - 45 % Final    Parathyroid Hormone, Intact 12/13/2024 111.6 (H)  18.5 - 88.0 pg/mL Final    Glucose 12/13/2024 117 (H)  74 - 99 mg/dL Final    Sodium 12/13/2024 141  136 - 145 mmol/L Final    Potassium 12/13/2024 4.4  3.5 - 5.3 mmol/L Final    Chloride 12/13/2024 106  98 - 107 mmol/L Final    Bicarbonate 12/13/2024 26  21 - 32 mmol/L Final    Anion Gap 12/13/2024 13  10 - 20 mmol/L Final    Urea Nitrogen 12/13/2024 26 (H)  6 - 23 mg/dL Final    Creatinine 12/13/2024 1.57 (H)  0.50 - 1.05 mg/dL Final    eGFR 12/13/2024 33 (L)  >60 mL/min/1.73m*2 Final    Calculations of estimated GFR are performed using the 2021 CKD-EPI Study Refit equation without the race variable for the IDMS-Traceable creatinine methods.  https://jasn.asnjournals.org/content/early/2021/09/22/ASN.6263248524    Calcium 12/13/2024 10.0  8.6 - 10.6 mg/dL Final    Phosphorus 12/13/2024 3.1  2.5 - 4.9 mg/dL Final    The performance  characteristics of phosphorus testing in heparinized plasma have been validated by the individual  laboratory site where testing is performed. Testing on heparinized plasma is not approved by the FDA; however, such approval is not necessary.    Albumin 12/13/2024 4.1  3.4 - 5.0 g/dL Final    Vitamin D, 25-Hydroxy, Total 12/13/2024 42  30 - 100 ng/mL Final   Lab on 10/31/2024   Component Date Value Ref Range Status    Hemoglobin A1C 10/31/2024 5.3  See comment % Final    Estimated Average Glucose 10/31/2024 105  Not Established mg/dL Final    Glucose 10/31/2024 111 (H)  74 - 99 mg/dL Final    Sodium 10/31/2024 140  136 - 145 mmol/L Final    Potassium 10/31/2024 4.5  3.5 - 5.3 mmol/L Final    Chloride 10/31/2024 104  98 - 107 mmol/L Final    Bicarbonate 10/31/2024 28  21 - 32 mmol/L Final    Anion Gap 10/31/2024 13  10 - 20 mmol/L Final    Urea Nitrogen 10/31/2024 23  6 - 23 mg/dL Final    Creatinine 10/31/2024 1.56 (H)  0.50 - 1.05 mg/dL Final    eGFR 10/31/2024 33 (L)  >60 mL/min/1.73m*2 Final    Calculations of estimated GFR are performed using the 2021 CKD-EPI Study Refit equation without the race variable for the IDMS-Traceable creatinine methods.  https://jasn.asnjournals.org/content/early/2021/09/22/ASN.5675061917    Calcium 10/31/2024 9.5  8.6 - 10.3 mg/dL Final    Albumin 10/31/2024 3.8  3.4 - 5.0 g/dL Final    Alkaline Phosphatase 10/31/2024 59  33 - 136 U/L Final    Total Protein 10/31/2024 6.3 (L)  6.4 - 8.2 g/dL Final    AST 10/31/2024 14  9 - 39 U/L Final    Bilirubin, Total 10/31/2024 0.7  0.0 - 1.2 mg/dL Final    ALT 10/31/2024 8  7 - 45 U/L Final    Patients treated with Sulfasalazine may generate falsely decreased results for ALT.    Thyroid Stimulating Hormone 10/31/2024 1.72  0.44 - 3.98 mIU/L Final    WBC 10/31/2024 6.1  4.4 - 11.3 x10*3/uL Final    nRBC 10/31/2024 0.0  0.0 - 0.0 /100 WBCs Final    RBC 10/31/2024 4.28  4.00 - 5.20 x10*6/uL Final    Hemoglobin 10/31/2024 14.8  12.0 - 16.0 g/dL  Final    Hematocrit 10/31/2024 44.4  36.0 - 46.0 % Final    MCV 10/31/2024 104 (H)  80 - 100 fL Final    MCH 10/31/2024 34.6 (H)  26.0 - 34.0 pg Final    MCHC 10/31/2024 33.3  32.0 - 36.0 g/dL Final    RDW 10/31/2024 12.9  11.5 - 14.5 % Final    Platelets 10/31/2024 258  150 - 450 x10*3/uL Final    Neutrophils % 10/31/2024 55.5  40.0 - 80.0 % Final    Immature Granulocytes %, Automated 10/31/2024 0.3  0.0 - 0.9 % Final    Immature Granulocyte Count (IG) includes promyelocytes, myelocytes and metamyelocytes but does not include bands. Percent differential counts (%) should be interpreted in the context of the absolute cell counts (cells/UL).    Lymphocytes % 10/31/2024 29.9  13.0 - 44.0 % Final    Monocytes % 10/31/2024 12.2  2.0 - 10.0 % Final    Eosinophils % 10/31/2024 1.3  0.0 - 6.0 % Final    Basophils % 10/31/2024 0.8  0.0 - 2.0 % Final    Neutrophils Absolute 10/31/2024 3.38  1.60 - 5.50 x10*3/uL Final    Percent differential counts (%) should be interpreted in the context of the absolute cell counts (cells/uL).    Immature Granulocytes Absolute, Au* 10/31/2024 0.02  0.00 - 0.50 x10*3/uL Final    Lymphocytes Absolute 10/31/2024 1.82  0.80 - 3.00 x10*3/uL Final    Monocytes Absolute 10/31/2024 0.74  0.05 - 0.80 x10*3/uL Final    Eosinophils Absolute 10/31/2024 0.08  0.00 - 0.40 x10*3/uL Final    Basophils Absolute 10/31/2024 0.05  0.00 - 0.10 x10*3/uL Final    Vitamin B12 10/31/2024 1,574 (H)  211 - 911 pg/mL Final    Magnesium 10/31/2024 2.25  1.60 - 2.40 mg/dL Final    Cholesterol 10/31/2024 234 (H)  0 - 199 mg/dL Final          Age      Desirable   Borderline High   High     0-19 Y     0 - 169       170 - 199     >/= 200    20-24 Y     0 - 189       190 - 224     >/= 225         >24 Y     0 - 199       200 - 239     >/= 240   **All ranges are based on fasting samples. Specific   therapeutic targets will vary based on patient-specific   cardiac risk.    Pediatric guidelines reference:Pediatrics 2011,  128(S5).Adult guidelines reference: NCEP ATPIII Guidelines,COSME 2001, 258:2486-97    Venipuncture immediately after or during the administration of Metamizole may lead to falsely low results. Testing should be performed immediately prior to Metamizole dosing.    HDL-Cholesterol 10/31/2024 95.9  mg/dL Final      Age       Very Low   Low     Normal    High    0-19 Y    < 35      < 40     40-45     ----  20-24 Y    ----     < 40      >45      ----        >24 Y      ----     < 40     40-60      >60      Cholesterol/HDL Ratio 10/31/2024 2.4   Final      Ref Values  Desirable  < 3.4  High Risk  > 5.0    Non-HDL Cholesterol 10/31/2024 138  0 - 149 mg/dL Final       Age        Desiable   Borderline High   High     Very High   0-19 Y     0 - 119       120 - 144     >/= 145    >/= 160  20-24 Y     0 - 149       150 - 189     >/= 190      ----       >24 Y    30 MG/DL ABOVE LDL CHOLESTEROL GOAL   Lab on 07/29/2024   Component Date Value Ref Range Status    Folate, Serum 07/29/2024 13.1  >5.0 ng/mL Final    Glucose, Fasting  07/29/2024 99  mg/dL Final    Glucose, One hour 07/29/2024 158  mg/dL Final    Glucose, Two hour 07/29/2024 144  mg/dL Final    Glucose, Three hour 07/29/2024 136  mg/dL Final   Lab on 06/17/2024   Component Date Value Ref Range Status    WBC 06/17/2024 6.9  4.4 - 11.3 x10*3/uL Final    nRBC 06/17/2024 0.0  0.0 - 0.0 /100 WBCs Final    RBC 06/17/2024 4.26  4.00 - 5.20 x10*6/uL Final    Hemoglobin 06/17/2024 14.5  12.0 - 16.0 g/dL Final    Hematocrit 06/17/2024 43.7  36.0 - 46.0 % Final    MCV 06/17/2024 103 (H)  80 - 100 fL Final    MCH 06/17/2024 34.0  26.0 - 34.0 pg Final    MCHC 06/17/2024 33.2  32.0 - 36.0 g/dL Final    RDW 06/17/2024 12.9  11.5 - 14.5 % Final    Platelets 06/17/2024 247  150 - 450 x10*3/uL Final    Ferritin 06/17/2024 91  8 - 150 ng/mL Final    Iron 06/17/2024 143  35 - 150 ug/dL Final    UIBC 06/17/2024 232  110 - 370 ug/dL Final    TIBC 06/17/2024 375  240 - 445 ug/dL Final    %  Saturation 06/17/2024 38  25 - 45 % Final    Magnesium 06/17/2024 2.22  1.60 - 2.40 mg/dL Final    Parathyroid Hormone, Intact 06/17/2024 69.5  18.5 - 88.0 pg/mL Final    Glucose 06/17/2024 85  74 - 99 mg/dL Final    Sodium 06/17/2024 140  136 - 145 mmol/L Final    Potassium 06/17/2024 5.0  3.5 - 5.3 mmol/L Final    Chloride 06/17/2024 104  98 - 107 mmol/L Final    Bicarbonate 06/17/2024 26  21 - 32 mmol/L Final    Anion Gap 06/17/2024 15  10 - 20 mmol/L Final    Urea Nitrogen 06/17/2024 33 (H)  6 - 23 mg/dL Final    Creatinine 06/17/2024 1.61 (H)  0.50 - 1.05 mg/dL Final    eGFR 06/17/2024 32 (L)  >60 mL/min/1.73m*2 Final    Calculations of estimated GFR are performed using the 2021 CKD-EPI Study Refit equation without the race variable for the IDMS-Traceable creatinine methods.  https://jasn.asnjournals.org/content/early/2021/09/22/ASN.2945769414    Calcium 06/17/2024 9.9  8.6 - 10.6 mg/dL Final    Phosphorus 06/17/2024 3.5  2.5 - 4.9 mg/dL Final    The performance characteristics of phosphorus testing in heparinized plasma have been validated by the individual  laboratory site where testing is performed. Testing on heparinized plasma is not approved by the FDA; however, such approval is not necessary.    Albumin 06/17/2024 3.9  3.4 - 5.0 g/dL Final    Vitamin D, 25-Hydroxy, Total 06/17/2024 60  30 - 100 ng/mL Final   Hospital Outpatient Visit on 06/10/2024   Component Date Value Ref Range Status    AV pk siomara 06/10/2024 1.61  m/s Final    AV mn grad 06/10/2024 6.0  mmHg Final    LVOT diam 06/10/2024 2.00  cm Final    LV Biplane EF 06/10/2024 63  % Final    MV avg E/e' ratio 06/10/2024 8.04   Final    MV E/A ratio 06/10/2024 0.92   Final    LA vol index A/L 06/10/2024 15.7  ml/m2 Final    Tricuspid annular plane systolic e* 06/10/2024 1.8  cm Final    RV free wall pk S' 06/10/2024 10.90  cm/s Final    LVIDd 06/10/2024 3.92  cm Final    RVSP 06/10/2024 48.4  mmHg Final    Aortic Valve Area by Continuity of*  06/10/2024 1.80  cm2 Final    Aortic Valve Area by Continuity of* 06/10/2024 1.91  cm2 Final    AV pk grad 06/10/2024 10.4  mmHg Final    LV A4C EF 06/10/2024 63.7   Final   Office Visit on 06/03/2024   Component Date Value Ref Range Status    Ventricular Rate 06/03/2024 78  BPM Final    Atrial Rate 06/03/2024 78  BPM Final    KS Interval 06/03/2024 186  ms Final    QRS Duration 06/03/2024 90  ms Final    QT Interval 06/03/2024 380  ms Final    QTC Calculation(Bazett) 06/03/2024 433  ms Final    P Axis 06/03/2024 72  degrees Final    R Axis 06/03/2024 74  degrees Final    T Fowler 06/03/2024 64  degrees Final    QRS Count 06/03/2024 13  beats Final    Q Onset 06/03/2024 218  ms Final    P Onset 06/03/2024 125  ms Final    P Offset 06/03/2024 185  ms Final    T Offset 06/03/2024 408  ms Final    QTC Fredericia 06/03/2024 414  ms Final         Diagnostic Test Results and Labs:  - Echocardiogram (May 10, 2024):    - Normal left ventricular ejection fraction.    - Mild aortic valve regurgitation.  - Laboratory Tests:    - Metanephrine: Normal    - Vitamin E: Normal    - Vitamin B6: Normal    - Triiodothyronine: Normal    - Erythrocyte Sedimentation Rate (ESR): Normal    - Rheumatoid Factor: Normal    - Phenotypic Atrophoresis: Normal    - Angiotensin-Converting Enzyme: Normal    - Anti-Parietal Cells: Normal    - Catecholamines: Normal    - White Blood Cells: Elevated    - Red Blood Cells: Mildly enlarged    - Blood Sugar: Elevated    - Kidney Function: Compromised    - Vitamin B12 (May 10, 2023): 1,287 (Normal)    - Methylmalonic Acid: Normal    - Folate: Pending additional testing    - Glucose Tolerance Test: Ordered      Social history:  She is , has a supportive , 3 sons and one daughter.  She denies smoking, ETOH and substance use, resides in both [place] and [place], presents with a history of health concerns. She has been engaged in hobbies such as puzzles and reading and participates in physical  activities like golf, and Yoga albeit to a lesser extent due to her fainting spells.     She was evaluate by the following specialists:    General Neurology.  Dr. Miranda, who performed an extensive testing, including autonomic nervous system tests, which identified a moderate autonomic neuropathy.     Neurology, Dr Chatman,   Movement disorder specialist   She has 2-year history of mild cognitive decline, personality changes with apathy and decreased emotional response, recurrent syncope and orthostasis.  On neurologic exam she has marked hypomimia, mild upper extremity bradykinesia and mild parkinsonian gait.  Unifying diagnosis could be early prodromal Parkinson's disease, however at this point she does not meet diagnostic criteria for PD.  Regarding her orthostatic hypotension, this could be related to an underlying synucleopathy however cardiac causes and other autonomic neuropathies are also possible.       Nephrology; Dr. Shweta Garcia, who had the following impression:  #CKD stage III initially was worsening currently stable, and has been monitoring her hypertension on Losartan 50 mg and osteoporosis on Vitamin D is low-continue D3 ( was on Alendronate 70 mg weekly-currently on hold)     Cardiology Dr. Abel Chong, who reported:  Patient had one episode of syncope in July during yoga lasting 2-3 seconds, no warning signs. She had a near syncopal episode about 3 weeks ago during yoga. Event monitor revealed episodes of atrial flutter (longest lasting ~ 7 minutes) and atrial tach with rates up to 182 bpm. She is currently on Xarelto (hx of PE). CT calcium score of 84 (RCA). Will initiate Flecainide 50mg bid.     She had a cardiac loop recorder placement .     Diagnostic Test Results:  - Autonomic nervous system testing (January 10, 2024) which reported:  This is an abnormal cardiovascular autonomic test panel due to the presence of a low VR, abnormal BP responses to the VM, mild orthostatic  hypotension and moderate supine hypertension without cardiac sympathetic failure. While there is no evidence of a significant cardiovagal abnormality, there are findings suggestive of a mild peripheral vasomotor adrenergic abnormality.   Moreover, the QSART findings are consistent with a postganglionic sympathetic sudomotor abnormality like that seen in a non-length dependent autonomic/small fiber neuropathy, as sparing of the foot is not typical of a distal axonopathy.     - Carotid Doppler: Heterogeneous calcified plaques in right and left internal carotid arteries, with no narrowing greater than 50%..    - Echocardiogram (2021): Performed which reported   1. The left ventricular systolic function is normal with a 60-65% estimated ejection fraction.   2. Spectral Doppler shows an impaired relaxation pattern of left ventricular diastolic filling.   3. The right ventricle is mildly enlarged. There is normal right ventricular global systolic function.   4. There is mild tricuspid regurgitation.   5. Moderately elevated pulmonary artery pressure, estimated RVSP 50mmHg.    Last Cardiology Tests:  ECG (10/25/23):  Sinus rhythm, HR 70 bpm.        Echo (2021):     CONCLUSIONS:   1. The left ventricular systolic function is normal with a 60-65% estimated ejection fraction.   2. Spectral Doppler shows an impaired relaxation pattern of left ventricular diastolic filling.   3. The right ventricle is mildly enlarged. There is normal right ventricular global systolic function.   4. There is mild tricuspid regurgitation.   5. Moderately elevated pulmonary artery pressure, estimated RVSP 50mmHg.     Event monitor (11 days - 09/2023):  Patient had a min HR of 52 bpm, max HR of 182 bpm, and avg HR of 83 bpm. Predominant underlying rhythm was Sinus Rhythm. 115 Supraventricular Tachycardia runs occurred, the run with the fastest interval lasting 10 beats with a max rate of 182 bpm, the longest lasting 15 beats with an avg rate of 118  bpm. Some episodes of Supraventricular Tachycardia may be possible Atrial Tachycardia with variable block. Atrial Flutter occurred (<1% burden), ranging from  bpm (avg of 133 bpm), the longest lasting 6 mins 57 secs with an avg rate of 143 bpm. Isolated SVEs were rare (<1.0%), SVE Couplets were rare (<1.0%), and SVE Triplets were rare (<1.0%). Isolated VEs were rare (<1.0%), VE Couplets were rare (<1.0%), and no VE Triplets were present. Ventricular Bigeminy was present.     CT cardiac scoring (10/25/2023):  IMPRESSION:  1. Coronary artery calcium score of 84.8*.      *Coronary Artery  Agatston score      Score  risk  Very low 1-99  Mildly increased 100-299  Moderately increased >300  Moderate to severely increased >800      - CT Scan of the brain (July 2023): Showed no significant atrophy, appearing normal.    - Implanted loop monitor: Present, indicating monitoring of heart rhythm.       Patient Active Problem List   Diagnosis    Abnormal mammogram    Pulmonary embolus    Behavioral change    Benign essential hypertension    Cogwheel rigidity    Colon polyps    Extrapyramidal movement disorder    Hyperlipidemia    Idiopathic osteoporosis    Lung nodule    Mild cognitive impairment    Presence of IVC filter    Stage 3b chronic kidney disease (Multi)    Vitamin B12 deficiency    Vitamin D deficiency    Syncope    Leg weakness    SVT (supraventricular tachycardia)    Abnormal computed tomography scan    Hypotension    Routine adult health maintenance             Results:         Lab Results   Component Value Date    RBMMBJII27 1,095 05/27/2025       IRON STUDIES:   Lab Results   Component Value Date    IRON 91 05/27/2025    TIBC 355 05/27/2025    FERRITIN 78 05/27/2025   His lab workup revealed    Lab Results   Component Value Date    SPEP Normal. 05/13/2024     CBC:   Lab Results   Component Value Date    WBC 7.0 05/27/2025    HGB 15.0 05/27/2025    HCT 45.8 (H) 05/27/2025     05/27/2025     BMP:   Lab  "Results   Component Value Date     05/27/2025    K 4.6 05/27/2025     05/27/2025    CO2 26 05/27/2025    BUN 22 05/27/2025    CREATININE 1.49 (H) 05/27/2025    CALCIUM 10.1 05/27/2025    MG 2.25 10/31/2024    PHOS 3.0 05/27/2025     LFT:   Lab Results   Component Value Date    ALKPHOS 67 05/27/2025    BILITOT 0.7 05/27/2025    BILIDIR 0.1 05/27/2025    PROT 6.7 05/27/2025    ALBUMIN 4.1 05/27/2025    ALT 9 05/27/2025    AST 16 05/27/2025             Problems Assessed/Relevant:  Problem List Items Addressed This Visit    None    Past Medical History:   Diagnosis Date    Encounter for gynecological examination (general) (routine) without abnormal findings     Encounter for routine pelvic examination    Other conditions influencing health status     Screening for malignant neoplasms, colon     Past Surgical History:   Procedure Laterality Date    CARDIAC ELECTROPHYSIOLOGY PROCEDURE Left 11/30/2023    Procedure: Loop Insertion;  Surgeon: Abel Chong MD;  Location: [facility];  Service: Electrophysiology;  Laterality: Left;    OTHER SURGICAL HISTORY  05/31/2013    Surgery Excision Of Parotid Tumor/Gland    OTHER SURGICAL HISTORY  05/31/2013    Spinal Diskectomy Lumbar    TONSILLECTOMY  05/31/2013    Tonsillectomy     Family History   Problem Relation Name Age of Onset    Colon cancer Mother      Other (Old Age) Father       Social History     Tobacco Use    Smoking status: Never    Smokeless tobacco: Never   Substance Use Topics    Alcohol use: Yes     Alcohol/week: 7.0 standard drinks of alcohol     Types: 7 Glasses of wine per week      Allergies   Allergen Reactions    Sulfa (Sulfonamide Antibiotics) Hives and Rash              Physical Exam:      /73 (Patient Position: Sitting)   Pulse 77   Ht 1.626 m (5' 4\")   Wt 61.2 kg (135 lb)   BMI 23.17 kg/m²   MMSE: attention span 4/5, recall 3/3, orientation arron 4/5, place 5/5, other wise intact naming, comprehension    Cranial nerves " are intact.   Physical Examination Findings:  - Neurological: No jaw tremor observed, minimal postural tremor noted, sensory ataxia not present.  - Musculoskeletal: Subcorporeal rigidity on the left, no corporeal rigidity on the right, anterior striding in the upper part of the body, decreased swinging of the arms, but balance is maintained.          Impression/Plan:   Paroxysmal spells of fainting for the last few years described as transient few seconds with associated orthostatic hypotension with physical exam consistent with masked face, hypomimia, left arm cogwheel rigidity, decreased swinging of the left arm while walking, and bradykinesia.    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      Antihypertensive medications and dehydration may play a role in her dizziness as well.       The patient has been doing relatively well ( 2 presyncopal episodes x 8 months compared to q 6 weeks before_      Orthostatic Hypotension and Autonomic Dysfunction    - Plan:  Vital Monitoring & Non-Pharmacologic Management      - Continue monitoring blood pressure and heart rate regularly.    - Maintain use of compression stockings and abdominal binder during daytime hours (especially in warmer weather).     - No need for Midodrine at this time--patient is clinically stable without it.     - Ensure adequate hydration: aim for 2-3 liters (approx. 120 oz) of water  daily.     - Increase salt intake as tolerated, while monitoring for potential hypertension.     - Recommend use of a shower bench to reduce fall risk during bathing.     - Split longer outdoor walks into shorter segments (e.g., two 0.7-mile walks instead of one 1.4-mile walk).  Prefer early morning walks to avoid heat-related exacerbation of symptoms.     Cognitive and Mental Health  - Engage in regular mental stimulation:  - Reading, puzzles, crosswords, Sudoku  - [facility] (Dr. Chele Srinivasan) in September for cognitive evaluation.      Diet and Nutrition    Follow a Mediterranean diet emphasizing:   - Vegetables, salads, fruits, olive oil, fish, and lean poultry  - Limit heavy carbohydrates, red meat, and fried foods  - Completely avoid alcohol, especially wine.    Exercise  Continue using recumbent bike at home:  4-5 days/week for 30 minutes at low resistance  Encourage cardiac rehabilitation referral for formal exercise assessment and guidance.  Cognitive and Mental Health  Engage in regular mental stimulation:  Reading, puzzles, crosswords, Sudoku       Medications  Continue the current medications and follow up with Cardiology as scheduled.  Schedule neurology follow-up in 6 months (December), or sooner if symptoms worsen.      The plan of care  was discussed in details with , and son in details.       Reviewed and approved by ABIGAIL ALFRED on 5/28/25 at 10:40 AM.    I personally spent [55] minutes on the day of the visit completing the review of the medical record and outside records, obtaining history and performing an appropriate physical exam, patient care, counseling and education, placing orders, independently reviewing results, communicating with the patient/family, coordinating care and performing appropriate clinical documentation.    Abigail Alfred M.D.

## 2025-05-28 NOTE — TELEPHONE ENCOUNTER
Pls inform  as pt has dementia   Sugar in early stages of preDM2  Work on dec sugars/simple cards.   Recheck in 6 mo   Otherwise labs WNL

## 2025-06-02 ENCOUNTER — APPOINTMENT (OUTPATIENT)
Dept: NEPHROLOGY | Facility: CLINIC | Age: 85
End: 2025-06-02
Payer: MEDICARE

## 2025-06-02 VITALS
HEIGHT: 65 IN | DIASTOLIC BLOOD PRESSURE: 78 MMHG | SYSTOLIC BLOOD PRESSURE: 132 MMHG | HEART RATE: 63 BPM | WEIGHT: 156.6 LBS | OXYGEN SATURATION: 96 % | TEMPERATURE: 97.3 F | BODY MASS INDEX: 26.09 KG/M2

## 2025-06-02 DIAGNOSIS — N18.32 STAGE 3B CHRONIC KIDNEY DISEASE (MULTI): Primary | ICD-10-CM

## 2025-06-02 DIAGNOSIS — E55.9 VITAMIN D DEFICIENCY: ICD-10-CM

## 2025-06-02 DIAGNOSIS — I10 HYPERTENSION, UNSPECIFIED TYPE: ICD-10-CM

## 2025-06-02 DIAGNOSIS — I10 BENIGN ESSENTIAL HYPERTENSION: ICD-10-CM

## 2025-06-02 PROCEDURE — 99214 OFFICE O/P EST MOD 30 MIN: CPT | Performed by: INTERNAL MEDICINE

## 2025-06-02 PROCEDURE — 3075F SYST BP GE 130 - 139MM HG: CPT | Performed by: INTERNAL MEDICINE

## 2025-06-02 PROCEDURE — G2211 COMPLEX E/M VISIT ADD ON: HCPCS | Performed by: INTERNAL MEDICINE

## 2025-06-02 PROCEDURE — 3078F DIAST BP <80 MM HG: CPT | Performed by: INTERNAL MEDICINE

## 2025-06-02 PROCEDURE — 1159F MED LIST DOCD IN RCRD: CPT | Performed by: INTERNAL MEDICINE

## 2025-06-02 NOTE — PATIENT INSTRUCTIONS
Dear TEJAS   It was nice seeing you in the nephrology clinic today     Today we discussed the following:     #Chronic kidney disease stage III-your kidney function is stable recently at 30-35%-will continue to monitor.  Baseline serum creatinine 1.5-1.7     #Hypertension-blood pressure is  In good control.  Continue losartan 25 mg-dropped from 50 per prior visit     #Limit salt intake. Ensure 40-50 ounces of fluid intake daily     #Vitamin D deficiency-now normal.  Continue vitamin D daily     #Osteoporosis-exercise regularly.  holding alendronate    # Recurrent fainting episodes-getting evaluated by cardiology and neurology.  Improved    # Trace leg swelling-improved from prior.  Will defer starting water pill at this time will monitor     Follow-up in 6 months with repeat blood work and urinalysis prior to next visit     Please call our office if you have any question  Thank you for coming to see me today     Shweta Garcia MD, MS, FRED LOMAS  Clinical  - [employer]  Nephrologist - [facility]

## 2025-06-02 NOTE — PROGRESS NOTES
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  Lorie came today with her .  Improved leg swelling.  Blood pressure is accepted.  Repeat blood work showed stable serum creatinine 0.4 and GFR 35.  Normal electrolytes.  No anemia hemoglobin 15.  Calcium is normal.  Vitamin D is normal.  Negative spot test ACR.  Fainting spells are getting better.  Last office visit he dropped losartan from 50 down to 25 mg.   had a list of questions and inquiries today-all addressed.  She stable from kidney standpoint    Prior notes    Last office visit June 2024.  Lorie was evaluated virtually today at her house.   was there and asked questions.  No kidney related complaints or concerns.  No more dizziness after we dropped losartan dose.  No frequent falls.  Blood pressure is reported to be accepted at home.  She had blood work done in December 2024 that were reviewed with her and her  including stable serum creatinine 1.5 and stable GFR 33 consistent with prior baseline.  With normal electrolytes.  No anemia hemoglobin 14.7.  Accepted iron storage parameters.  No albuminuria spot test ACR negative.  Vitamin D is normal-has been reports she is being on 1000 mg D3.  PTH is elevated 116-no change from prior.   showed some concern about elevated PTH-reassured.  Overall she is stable from kidney standpoint.  6 months follow-up    Prior notes    Last office visit December 2023.  Rechecking today with her .  She reports continuous dizziness.  She fell recently.  Blood pressure is accepted today.  She continues to be on losartan 50 mg.  Prior urinalysis came back with no albuminuria.  Serum creatinine is hovering between 1.4-1.6 and GFR around 30 relatively stable.   She is currently off bisphosphonate/alendronate.  She had blood work done recently that showed stable serum creatinine 1.6 and GFR 32 and within normal electrolytes with no significant acidosis.  No significant anemia.  With a normal vitamin D.  With normal calcium and magnesium.  We discussed conservative measures today.  Will drop losartan to address concerns about dizziness and possible hemodynamic injury.    Prior notes    Last office visit June 2023.  We followed conservative measures.  Today, Lorie came with her  and her son.  No kidney related complaints or concerns.  Recent episodes of recurrent fainting-under cardiology/neurology evaluation.  She received internal cardiac monitor.  She denies palpitation today.  She denies shortness of breath or chest pain.  Blood pressure is excellent.  Recent blood work that showed stable improved serum creatinine and GFR.  Is normal electrolytes.  No albuminuria    Prior notes     Last office visit November 2022. In interim, she was admitted to the hospital in December 2022 after syncopal episode and was found to have COVID-19 and acute kidney injury. Today, she came with her . No kidney related concerns or complaints. She had blood work done recently and all results were discussed with him in details including serum creatinine back to baseline and within normal extra lites. Blood pressure is accepted today. No lower urinary tract symptoms     Per prior notes     Last initial office visit was August 2022. We discussed conservative measures. We continued RAAS inhibitor. We encouraged increased fluid intake and limit salt intake. We recommended holding alendronate temporarily. In interim, she feels well. She had blood work done recently and all results were discussed with her and her  in detail today including stable serum creatinine 1.3 and GFR 38 and within normal electrolytes including calcium. Vitamin D is slightly low-currently not on vitamin D  "supplements. Urinalysis bland with no blood or albumin. Lorie came today with her  and all questions were answered. They are planning to go to [place] over the winter        Per prior notes     Lorie came today with her . She reports feeling in her baseline state of health. She denies any urinary tract issues. She denies chest pain or shortness of breath. She follows low-salt diet. She thinks she is not drinking much fluid. She has been on bisphosphonate for a year. She denies blood or foam in the urine. Urine dipstick today is bland. Blood pressure is accepted. No significant NSAID use at home     Family history: No kidney issues run in the family  Social history: . Used to work as an , non-smoker       Objective   /78 (BP Location: Left arm, Patient Position: Standing, BP Cuff Size: Adult)   Pulse 63   Temp 36.3 °C (97.3 °F)   Ht 1.651 m (5' 5\")   Wt 71 kg (156 lb 9.6 oz)   SpO2 96%   BMI 26.06 kg/m²   Wt Readings from Last 3 Encounters:   06/02/25 71 kg (156 lb 9.6 oz)   05/28/25 61.2 kg (135 lb)   05/12/25 71.2 kg (157 lb)       Physical Exam    General appearance: no distress awake and alert on room air, euvolemic on exam  Eyes: non-icteric  HEENT: atrumatic head, PEERLA, moist mucosa  Skin: no apparent rash  CVS.  No murmurs  Lungs clear bilateral  Leg-compression stockings applied no swelling    Review of Systems     Constitutional: no fever, no chills, no recent weight gain and no recent weight loss.   Eyes: no blurred vision and no diplopia.   ENT: no hearing loss, no earache, no sore throat, no swollen glands in the neck and no nasal discharge.   Cardiovascular: no chest pain, no palpitations and no lower extremity edema.   Respiratory: no shortness of breath, no chronic cough and no shortness of breath during exertion.   Gastrointestinal: no abdominal pain, no constipation, no heartburn, no vomiting, no bloody stools and no change in bowel movements. " "  Genitourinary: no dysuria and no hematuria.   Musculoskeletal: no arthralgias and no myalgias.   Skin: no rashes and no skin lesions.   Neurologica dizziness and occasional fainting spells  Psychiatric: no confusion, no depression and no anxiety.   Endocrine: no heat intolerance, no cold intolerance, appetite not increased, no thyroid disorder, no increased urinary frequency and no dry skin.   Hematologic/Lymphatic: does not bleed easily and does not bruise easily.   All other systems have been reviewed and are negative for complaint.         Data Review        Results from last 7 days   Lab Units 05/27/25  1013   QUEST WBC AUTO Thousand/uL 7.0   [facility] HEMOGLOBIN g/dL 15.0   QUEST HEMATOCRIT % 45.8*   [facility] PLATELETS AUTO Thousand/uL 260            No results found for: \"URICACID\"        Lab Results   Component Value Date    HGBA1C 5.8 (H) 05/27/2025           Results from last 7 days   Lab Units 05/27/25  1013   [facility] SODIUM mmol/L 141   [facility] POTASSIUM mmol/L 4.6   [facility] CHLORIDE mmol/L 106   [facility] CO2 mmol/L 26   [facility] BUN mg/dL 22   QUEST GLUCOSE mg/dL 111*   QUEST CALCIUM mg/dL 10.1   QUEST EGFR mL/min/1.73m2 35*           ALBUMIN/CREATININE RATIO, RANDOM URINE   Date Value Ref Range Status   05/27/2025 3 <30 mg/g creat Final     Comment:        The ADA defines abnormalities in albumin  excretion as follows:     Albuminuria Category        Result (mg/g creatinine)     Normal to Mildly increased   <30  Moderately increased            Severely increased           > OR = 300     The ADA recommends that at least two of three  specimens collected within a 3-6 month period be  abnormal before considering a patient to be  within a diagnostic category.       Albumin/Creatinine Ratio   Date Value Ref Range Status   12/13/2024 15.4 <30.0 ug/mg Creat Final   12/11/2023 13.3 <30.0 ug/mg Creat Final            RFP  Recent Labs     05/27/25  1013 12/13/24  1352 10/31/24  1504 06/17/24  1154 05/13/24  1608 " 12/11/23  1339 07/31/23  1642 05/10/23  1501 12/17/22  0619 12/16/22  2154 11/28/22  1330    141 140 140 139 142 138 140 137   < > 140   K 4.6 4.4 4.5 5.0 4.5 4.5 4.5 4.7 3.9   < > 4.6    106 104 104 105 105 103 104 107   < > 104   CO2 26 26 28 26 26 26 27 26 23   < > 26   BUN 22 26* 23 33* 32* 17 25* 19 16   < > 17   CREATININE 1.49* 1.57* 1.56* 1.61* 1.57* 1.37* 1.45* 1.36* 1.12*   < > 1.38*   GLUCOSE 111* 117* 111* 85 105* 144* 96 100* 98   < > 129*   CALCIUM 10.1 10.0 9.5 9.9 9.9 9.6 9.3 9.7 8.5*   < > 9.9   PHOS 3.0 3.1  --  3.5  --  2.7  --   --  2.7  --  2.9   EGFR 35* 33* 33* 32* 33* 39*  --   --   --   --   --    ANIONGAP  --  13 13 15 13 16 13 15 11   < > 15    < > = values in this interval not displayed.        Urineanalysis  Recent Labs     07/31/23  1841 12/16/22  2330 11/28/22  1330   COLORU STRAW YELLOW YELLOW   APPEARANCEU CLEAR CLEAR CLEAR   SPECGRAVU 1.013 1.020 >1.030*   NANCY 6.0 5.5 5.5   PROTUR NEGATIVE NEGATIVE NEGATIVE   GLUCOSEU NEGATIVE NEGATIVE NEGATIVE   BLOODU NEGATIVE TRACE* NEGATIVE   KETONESU 5(Trace)* TRACE* NEGATIVE   BILIRUBINU NEGATIVE NEGATIVE NEGATIVE   NITRITEU NEGATIVE NEGATIVE NEGATIVE   LEUKOCYTESU NEGATIVE TRACE* NEGATIVE       Urine Electrolytes  Recent Labs     05/27/25  1013 12/13/24  1352 12/11/23  1339 07/31/23  1841 06/07/23  1625 12/16/22  2330 11/28/22  1330   CREATU 208 211.9 103.6  --  212.0  --  185.0   PROTUR  --   --   --  NEGATIVE  --  NEGATIVE NEGATIVE   ALBUMINUR 0.7 32.7 13.8  --   --   --   --    MICROALBCREA 3 15.4 13.3  --  5.0  --  4.2        Urine Micro  Recent Labs     12/16/22  2330   WBCU 2   RBCU <1   SQUAMEPIU 1   BACTERIAU 1+*   MUCUSU FEW        Iron  Recent Labs     05/27/25  1013 12/13/24  1352 06/17/24  1154 12/11/23  1339 12/16/22  2154   IRON 91 124 143 73  --    TIBC 355 367 375 346  --    IRONSAT 26 34 38 21*  --    FERRITIN 78 116 91 162* 151*            Current Outpatient Medications:     cholecalciferol (Vitamin D-3) 50 mcg  (2,000 unit) capsule, Take 1 capsule (50 mcg) by mouth once daily., Disp: , Rfl:     cyanocobalamin, vitamin B-12, (Vitamin B-12) 1,000 mcg tablet extended release, Take 1 tablet (1,000 mcg) by mouth once daily. as directed (Patient taking differently: Take 0.5 tablets by mouth once daily. as directed), Disp: , Rfl:     flecainide (Tambocor) 50 mg tablet, Take 1 tablet (50 mg) by mouth 2 times a day., Disp: 180 tablet, Rfl: 3    losartan (Cozaar) 25 mg tablet, Take 1 tablet (25 mg) by mouth once daily., Disp: 90 tablet, Rfl: 0    rivaroxaban (Xarelto) 20 mg tablet, Take 1 tablet (20 mg) by mouth once daily with breakfast. Take with food., Disp: 90 tablet, Rfl: 3    Arexvy, PF, 120 mcg/0.5 mL suspension for reconstitution, Inject 0.5 mL into the muscle 1 time. 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  New baseline serum creatinine 1.5-1.7, new baseline GFR around 30 mill per minute per 1.73 m²  -I am concerned about possible bisphosphonate induced renal toxicity albeit her urine dipstick was bland-currently bisphosphonate is on hold. I counseled regarding conservative measures, limit salt intake, increase fluid intake and temporary continue to hold bisphosphonate.  Will continue decreased dose losartan 25 mg today.  Will monitor closely  -Kidney ultrasound is unremarkable in June 2022  -Urine analysis is bland with no albumin or blood.  Negative spot test ACR-will monitor     #Hypertension-accepted  -Current medication losartan 25 mg-dropped from prior 50 mg.  Negative albuminuria and possible hemodynamic injury causing  dizziness-improved significantly after dropping losartan     #CKD/MBD/hyperparathyroidism  -She has a diagnosis of osteoporosis on alendronate 70 mg weekly-currently on hold  -Vitamin D is normal-continue D3 supplements within normal phosphorus, calcium, albumin.  -PTH is elevated -will monitor.  Will continue vitamin D supplements     #No significant anemia hemoglobin 13-14.  Negative SPEP     #CVS/PE-on Xarelto     Follow-up in 6 months with repeat blood work and urinalysis prior to visit              Shweta Garcia MD, MS, FRED LOMAS  Clinical  - [employer] [employer] [facility] [employer]  Nephrologist - [facility]

## 2025-06-10 ENCOUNTER — HOSPITAL ENCOUNTER (OUTPATIENT)
Dept: CARDIOLOGY | Facility: CLINIC | Age: 85
Discharge: HOME | End: 2025-06-10
Payer: MEDICARE

## 2025-06-10 DIAGNOSIS — R55 SYNCOPE AND COLLAPSE: ICD-10-CM

## 2025-06-10 DIAGNOSIS — Z45.09 ENCOUNTER FOR LOOP RECORDER CHECK: ICD-10-CM

## 2025-06-12 DIAGNOSIS — I10 BENIGN ESSENTIAL HYPERTENSION: ICD-10-CM

## 2025-06-12 DIAGNOSIS — I10 HYPERTENSION, UNSPECIFIED TYPE: ICD-10-CM

## 2025-06-12 DIAGNOSIS — N18.32 STAGE 3B CHRONIC KIDNEY DISEASE (MULTI): ICD-10-CM

## 2025-06-12 RX ORDER — LOSARTAN POTASSIUM 25 MG/1
25 TABLET ORAL DAILY
Qty: 90 TABLET | Refills: 0 | Status: SHIPPED | OUTPATIENT
Start: 2025-06-12

## 2025-06-16 ENCOUNTER — APPOINTMENT (OUTPATIENT)
Dept: CARDIOLOGY | Facility: HOSPITAL | Age: 85
End: 2025-06-16
Payer: MEDICARE

## 2025-06-16 ENCOUNTER — APPOINTMENT (OUTPATIENT)
Dept: NEPHROLOGY | Facility: CLINIC | Age: 85
End: 2025-06-16
Payer: MEDICARE

## 2025-06-23 ENCOUNTER — APPOINTMENT (OUTPATIENT)
Dept: NEPHROLOGY | Facility: CLINIC | Age: 85
End: 2025-06-23
Payer: MEDICARE

## 2025-07-07 ENCOUNTER — APPOINTMENT (OUTPATIENT)
Dept: CARDIOLOGY | Facility: HOSPITAL | Age: 85
End: 2025-07-07
Payer: MEDICARE

## 2025-07-07 VITALS
HEART RATE: 85 BPM | DIASTOLIC BLOOD PRESSURE: 75 MMHG | WEIGHT: 152.56 LBS | OXYGEN SATURATION: 97 % | SYSTOLIC BLOOD PRESSURE: 131 MMHG | BODY MASS INDEX: 25.39 KG/M2

## 2025-07-07 DIAGNOSIS — I47.10 SVT (SUPRAVENTRICULAR TACHYCARDIA): ICD-10-CM

## 2025-07-07 DIAGNOSIS — I10 BENIGN ESSENTIAL HYPERTENSION: Primary | ICD-10-CM

## 2025-07-07 DIAGNOSIS — R55 SYNCOPE AND COLLAPSE: ICD-10-CM

## 2025-07-07 PROCEDURE — 93005 ELECTROCARDIOGRAM TRACING: CPT | Performed by: STUDENT IN AN ORGANIZED HEALTH CARE EDUCATION/TRAINING PROGRAM

## 2025-07-07 PROCEDURE — 1159F MED LIST DOCD IN RCRD: CPT | Performed by: STUDENT IN AN ORGANIZED HEALTH CARE EDUCATION/TRAINING PROGRAM

## 2025-07-07 PROCEDURE — 99212 OFFICE O/P EST SF 10 MIN: CPT

## 2025-07-07 PROCEDURE — 1036F TOBACCO NON-USER: CPT | Performed by: STUDENT IN AN ORGANIZED HEALTH CARE EDUCATION/TRAINING PROGRAM

## 2025-07-07 PROCEDURE — 3075F SYST BP GE 130 - 139MM HG: CPT | Performed by: STUDENT IN AN ORGANIZED HEALTH CARE EDUCATION/TRAINING PROGRAM

## 2025-07-07 PROCEDURE — 3078F DIAST BP <80 MM HG: CPT | Performed by: STUDENT IN AN ORGANIZED HEALTH CARE EDUCATION/TRAINING PROGRAM

## 2025-07-07 PROCEDURE — 99214 OFFICE O/P EST MOD 30 MIN: CPT | Performed by: STUDENT IN AN ORGANIZED HEALTH CARE EDUCATION/TRAINING PROGRAM

## 2025-07-08 LAB
ATRIAL RATE: 79 BPM
P AXIS: 73 DEGREES
P OFFSET: 193 MS
P ONSET: 128 MS
PR INTERVAL: 180 MS
Q ONSET: 218 MS
QRS COUNT: 13 BEATS
QRS DURATION: 84 MS
QT INTERVAL: 378 MS
QTC CALCULATION(BAZETT): 433 MS
QTC FREDERICIA: 414 MS
R AXIS: 49 DEGREES
T AXIS: 60 DEGREES
T OFFSET: 407 MS
VENTRICULAR RATE: 79 BPM

## 2025-07-21 ENCOUNTER — HOSPITAL ENCOUNTER (OUTPATIENT)
Dept: CARDIOLOGY | Facility: CLINIC | Age: 85
Discharge: HOME | End: 2025-07-21
Payer: MEDICARE

## 2025-07-21 DIAGNOSIS — R55 SYNCOPE AND COLLAPSE: ICD-10-CM

## 2025-07-21 PROCEDURE — 93298 REM INTERROG DEV EVAL SCRMS: CPT

## 2025-08-07 ENCOUNTER — HOSPITAL ENCOUNTER (OUTPATIENT)
Dept: CARDIOLOGY | Facility: CLINIC | Age: 85
Discharge: HOME | End: 2025-08-07
Payer: MEDICARE

## 2025-08-07 DIAGNOSIS — R55 SYNCOPE AND COLLAPSE: ICD-10-CM

## 2025-08-08 ENCOUNTER — HOSPITAL ENCOUNTER (OUTPATIENT)
Dept: CARDIOLOGY | Facility: CLINIC | Age: 85
Discharge: HOME | End: 2025-08-08
Payer: MEDICARE

## 2025-08-08 DIAGNOSIS — R55 SYNCOPE AND COLLAPSE: ICD-10-CM

## 2025-08-28 ENCOUNTER — HOSPITAL ENCOUNTER (OUTPATIENT)
Dept: CARDIOLOGY | Facility: CLINIC | Age: 85
Discharge: HOME | End: 2025-08-28
Payer: MEDICARE

## 2025-08-28 DIAGNOSIS — R55 SYNCOPE AND COLLAPSE: ICD-10-CM

## 2025-08-28 PROCEDURE — 93298 REM INTERROG DEV EVAL SCRMS: CPT

## 2025-08-28 PROCEDURE — 93298 REM INTERROG DEV EVAL SCRMS: CPT | Performed by: STUDENT IN AN ORGANIZED HEALTH CARE EDUCATION/TRAINING PROGRAM

## 2025-09-02 ENCOUNTER — HOSPITAL ENCOUNTER (OUTPATIENT)
Dept: CARDIOLOGY | Facility: CLINIC | Age: 85
Discharge: HOME | End: 2025-09-02
Payer: MEDICARE

## 2025-09-02 DIAGNOSIS — R55 SYNCOPE AND COLLAPSE: ICD-10-CM

## 2025-11-12 ENCOUNTER — APPOINTMENT (OUTPATIENT)
Dept: PRIMARY CARE | Facility: CLINIC | Age: 85
End: 2025-11-12
Payer: MEDICARE

## 2025-12-08 ENCOUNTER — APPOINTMENT (OUTPATIENT)
Dept: NEPHROLOGY | Facility: CLINIC | Age: 85
End: 2025-12-08
Payer: MEDICARE